# Patient Record
Sex: MALE | Race: WHITE | NOT HISPANIC OR LATINO | Employment: FULL TIME | URBAN - METROPOLITAN AREA
[De-identification: names, ages, dates, MRNs, and addresses within clinical notes are randomized per-mention and may not be internally consistent; named-entity substitution may affect disease eponyms.]

---

## 2019-10-24 ENCOUNTER — HOSPITAL ENCOUNTER (OUTPATIENT)
Dept: MEDSURG UNIT | Facility: HOSPITAL | Age: 54
End: 2019-10-26
Attending: INTERNAL MEDICINE | Admitting: INTERNAL MEDICINE

## 2019-10-24 LAB
ABS NEUT (OLG): 4.58 X10(3)/MCL (ref 2.1–9.2)
ALBUMIN SERPL-MCNC: 3.7 GM/DL (ref 3.4–5)
ALBUMIN/GLOB SERPL: 1.1 RATIO (ref 1.1–2)
ALP SERPL-CCNC: 101 UNIT/L (ref 45–117)
ALT SERPL-CCNC: 24 UNIT/L (ref 12–78)
AMPHET UR QL SCN: POSITIVE
APPEARANCE, UA: CLEAR
AST SERPL-CCNC: 17 UNIT/L (ref 15–37)
BACTERIA #/AREA URNS AUTO: ABNORMAL /HPF
BARBITURATE SCN PRESENT UR: NEGATIVE
BASOPHILS # BLD AUTO: 0.1 X10(3)/MCL (ref 0–0.2)
BASOPHILS NFR BLD AUTO: 1 %
BENZODIAZ UR QL SCN: NEGATIVE
BILIRUB SERPL-MCNC: 0.5 MG/DL (ref 0.2–1)
BILIRUB UR QL STRIP: NEGATIVE
BILIRUBIN DIRECT+TOT PNL SERPL-MCNC: 0.1 MG/DL (ref 0–0.2)
BILIRUBIN DIRECT+TOT PNL SERPL-MCNC: 0.4 MG/DL
BUN SERPL-MCNC: 18 MG/DL (ref 7–18)
CALCIUM SERPL-MCNC: 8.7 MG/DL (ref 8.5–10.1)
CANNABINOIDS UR QL SCN: POSITIVE
CHLORIDE SERPL-SCNC: 104 MMOL/L (ref 98–107)
CK MB SERPL-MCNC: 3.5 NG/ML (ref 1–3.6)
CK SERPL-CCNC: 147 UNIT/L (ref 39–308)
CO2 SERPL-SCNC: 28 MMOL/L (ref 21–32)
COCAINE UR QL SCN: NEGATIVE
COLOR UR: NORMAL
CREAT SERPL-MCNC: 1.4 MG/DL (ref 0.6–1.3)
EOSINOPHIL # BLD AUTO: 0.2 X10(3)/MCL (ref 0–0.9)
EOSINOPHIL NFR BLD AUTO: 2 %
ERYTHROCYTE [DISTWIDTH] IN BLOOD BY AUTOMATED COUNT: 12.6 % (ref 11.5–14.5)
GLOBULIN SER-MCNC: 3.4 GM/ML (ref 2.3–3.5)
GLUCOSE (UA): NORMAL
GLUCOSE SERPL-MCNC: 112 MG/DL (ref 74–106)
HCT VFR BLD AUTO: 43.7 % (ref 40–51)
HGB BLD-MCNC: 15.1 GM/DL (ref 13.5–17.5)
HGB UR QL STRIP: NEGATIVE
HYALINE CASTS #/AREA URNS LPF: ABNORMAL /LPF
IMM GRANULOCYTES # BLD AUTO: 0.03 10*3/UL
IMM GRANULOCYTES NFR BLD AUTO: 0 %
KETONES UR QL STRIP: NEGATIVE
LEUKOCYTE ESTERASE UR QL STRIP: NEGATIVE
LYMPHOCYTES # BLD AUTO: 2.6 X10(3)/MCL (ref 0.6–4.6)
LYMPHOCYTES NFR BLD AUTO: 33 %
MAGNESIUM SERPL-MCNC: 2 MG/DL (ref 1.6–2.6)
MCH RBC QN AUTO: 32.8 PG (ref 26–34)
MCHC RBC AUTO-ENTMCNC: 34.6 GM/DL (ref 31–37)
MCV RBC AUTO: 94.8 FL (ref 80–100)
MONOCYTES # BLD AUTO: 0.6 X10(3)/MCL (ref 0.1–1.3)
MONOCYTES NFR BLD AUTO: 7 %
NEUTROPHILS # BLD AUTO: 4.58 X10(3)/MCL (ref 2.1–9.2)
NEUTROPHILS NFR BLD AUTO: 57 %
NITRITE UR QL STRIP: NEGATIVE
OPIATES UR QL SCN: NEGATIVE
PCP UR QL: NEGATIVE
PH UR STRIP.AUTO: 7 [PH] (ref 5–8)
PH UR STRIP: 7 [PH] (ref 4.5–8)
PLATELET # BLD AUTO: 275 X10(3)/MCL (ref 130–400)
PMV BLD AUTO: 8.9 FL (ref 7.4–10.4)
POTASSIUM SERPL-SCNC: 3.8 MMOL/L (ref 3.5–5.1)
PROT SERPL-MCNC: 7.1 GM/DL (ref 6.4–8.2)
PROT UR QL STRIP: NEGATIVE
RBC # BLD AUTO: 4.61 X10(6)/MCL (ref 4.5–5.9)
RBC #/AREA URNS AUTO: ABNORMAL /HPF
SODIUM SERPL-SCNC: 136 MMOL/L (ref 136–145)
SP GR UR STRIP: 1.01 (ref 1–1.03)
SQUAMOUS #/AREA URNS LPF: ABNORMAL /LPF
TEMPERATURE, URINE (OHS): 20 DEGC (ref 20–25)
TROPONIN I SERPL-MCNC: 0.04 NG/ML (ref 0–0.05)
UROBILINOGEN UR STRIP-ACNC: NORMAL
WBC # SPEC AUTO: 8.1 X10(3)/MCL (ref 4.5–11)
WBC #/AREA URNS AUTO: ABNORMAL /HPF

## 2019-10-25 LAB
ABS NEUT (OLG): 4.64 X10(3)/MCL (ref 2.1–9.2)
ALBUMIN SERPL-MCNC: 3.5 GM/DL (ref 3.4–5)
ALBUMIN/GLOB SERPL: 1 RATIO (ref 1.1–2)
ALP SERPL-CCNC: 95 UNIT/L (ref 45–117)
ALT SERPL-CCNC: 21 UNIT/L (ref 12–78)
AST SERPL-CCNC: 15 UNIT/L (ref 15–37)
BASOPHILS # BLD AUTO: 0 X10(3)/MCL (ref 0–0.2)
BASOPHILS NFR BLD AUTO: 1 %
BILIRUB SERPL-MCNC: 0.6 MG/DL (ref 0.2–1)
BILIRUBIN DIRECT+TOT PNL SERPL-MCNC: 0.2 MG/DL (ref 0–0.2)
BILIRUBIN DIRECT+TOT PNL SERPL-MCNC: 0.4 MG/DL
BUN SERPL-MCNC: 14 MG/DL (ref 7–18)
CALCIUM SERPL-MCNC: 8.5 MG/DL (ref 8.5–10.1)
CHLORIDE SERPL-SCNC: 106 MMOL/L (ref 98–107)
CHOLEST SERPL-MCNC: 174 MG/DL
CHOLEST/HDLC SERPL: 3.7 {RATIO} (ref 0–5)
CO2 SERPL-SCNC: 24 MMOL/L (ref 21–32)
CREAT SERPL-MCNC: 1.1 MG/DL (ref 0.6–1.3)
EOSINOPHIL # BLD AUTO: 0.2 X10(3)/MCL (ref 0–0.9)
EOSINOPHIL NFR BLD AUTO: 2 %
ERYTHROCYTE [DISTWIDTH] IN BLOOD BY AUTOMATED COUNT: 12.7 % (ref 11.5–14.5)
EST. AVERAGE GLUCOSE BLD GHB EST-MCNC: 117 MG/DL
FOLATE SERPL-MCNC: 5.9 NG/ML (ref 3.1–17.5)
GLOBULIN SER-MCNC: 3.5 GM/ML (ref 2.3–3.5)
GLUCOSE SERPL-MCNC: 113 MG/DL (ref 74–106)
HAV IGM SERPL QL IA: NONREACTIVE
HBA1C MFR BLD: 5.7 % (ref 4.2–6.3)
HBV CORE IGM SERPL QL IA: NONREACTIVE
HBV SURFACE AG SERPL QL IA: NEGATIVE
HCT VFR BLD AUTO: 46.2 % (ref 40–51)
HCV AB SERPL QL IA: NONREACTIVE
HDLC SERPL-MCNC: 47 MG/DL (ref 40–59)
HGB BLD-MCNC: 15.6 GM/DL (ref 13.5–17.5)
HIV 1+2 AB+HIV1 P24 AG SERPL QL IA: NONREACTIVE
IMM GRANULOCYTES # BLD AUTO: 0.03 10*3/UL
IMM GRANULOCYTES NFR BLD AUTO: 0 %
LDLC SERPL CALC-MCNC: 100 MG/DL
LYMPHOCYTES # BLD AUTO: 2.6 X10(3)/MCL (ref 0.6–4.6)
LYMPHOCYTES NFR BLD AUTO: 32 %
MAGNESIUM SERPL-MCNC: 2.2 MG/DL (ref 1.6–2.6)
MCH RBC QN AUTO: 32.6 PG (ref 26–34)
MCHC RBC AUTO-ENTMCNC: 33.8 GM/DL (ref 31–37)
MCV RBC AUTO: 96.5 FL (ref 80–100)
MONOCYTES # BLD AUTO: 0.6 X10(3)/MCL (ref 0.1–1.3)
MONOCYTES NFR BLD AUTO: 8 %
NEUTROPHILS # BLD AUTO: 4.64 X10(3)/MCL (ref 2.1–9.2)
NEUTROPHILS NFR BLD AUTO: 57 %
PHOSPHATE SERPL-MCNC: 3.1 MG/DL (ref 2.5–4.9)
PLATELET # BLD AUTO: 273 X10(3)/MCL (ref 130–400)
PMV BLD AUTO: 9.1 FL (ref 7.4–10.4)
POTASSIUM SERPL-SCNC: 4.1 MMOL/L (ref 3.5–5.1)
PROT SERPL-MCNC: 7 GM/DL (ref 6.4–8.2)
RBC # BLD AUTO: 4.79 X10(6)/MCL (ref 4.5–5.9)
SODIUM SERPL-SCNC: 138 MMOL/L (ref 136–145)
TRIGL SERPL-MCNC: 133 MG/DL
TROPONIN I SERPL-MCNC: 0.04 NG/ML (ref 0–0.05)
TSH SERPL-ACNC: 0.99 MIU/L (ref 0.36–3.74)
VIT B12 SERPL-MCNC: 423 PG/ML (ref 193–986)
VLDLC SERPL CALC-MCNC: 27 MG/DL
WBC # SPEC AUTO: 8.2 X10(3)/MCL (ref 4.5–11)

## 2019-10-26 LAB
ABS NEUT (OLG): 5.25 X10(3)/MCL (ref 2.1–9.2)
ABS NEUT (OLG): 5.76 X10(3)/MCL (ref 2.1–9.2)
ALBUMIN SERPL-MCNC: 3.5 GM/DL (ref 3.4–5)
ALBUMIN SERPL-MCNC: 3.6 GM/DL (ref 3.4–5)
ALBUMIN/GLOB SERPL: 0.9 RATIO (ref 1.1–2)
ALBUMIN/GLOB SERPL: 0.9 RATIO (ref 1.1–2)
ALP SERPL-CCNC: 100 UNIT/L (ref 45–117)
ALP SERPL-CCNC: 102 UNIT/L (ref 45–117)
ALT SERPL-CCNC: 21 UNIT/L (ref 12–78)
ALT SERPL-CCNC: 23 UNIT/L (ref 12–78)
AST SERPL-CCNC: 14 UNIT/L (ref 15–37)
AST SERPL-CCNC: 18 UNIT/L (ref 15–37)
BASOPHILS # BLD AUTO: 0.1 X10(3)/MCL (ref 0–0.2)
BASOPHILS # BLD AUTO: 0.1 X10(3)/MCL (ref 0–0.2)
BASOPHILS NFR BLD AUTO: 1 %
BASOPHILS NFR BLD AUTO: 1 %
BILIRUB SERPL-MCNC: 0.5 MG/DL (ref 0.2–1)
BILIRUB SERPL-MCNC: 0.5 MG/DL (ref 0.2–1)
BILIRUBIN DIRECT+TOT PNL SERPL-MCNC: 0.1 MG/DL (ref 0–0.2)
BILIRUBIN DIRECT+TOT PNL SERPL-MCNC: 0.2 MG/DL (ref 0–0.2)
BILIRUBIN DIRECT+TOT PNL SERPL-MCNC: 0.3 MG/DL
BILIRUBIN DIRECT+TOT PNL SERPL-MCNC: 0.4 MG/DL
BUN SERPL-MCNC: 17 MG/DL (ref 7–18)
BUN SERPL-MCNC: 18 MG/DL (ref 7–18)
CALCIUM SERPL-MCNC: 8.9 MG/DL (ref 8.5–10.1)
CALCIUM SERPL-MCNC: 9 MG/DL (ref 8.5–10.1)
CHLORIDE SERPL-SCNC: 101 MMOL/L (ref 98–107)
CHLORIDE SERPL-SCNC: 102 MMOL/L (ref 98–107)
CO2 SERPL-SCNC: 25 MMOL/L (ref 21–32)
CO2 SERPL-SCNC: 28 MMOL/L (ref 21–32)
CREAT SERPL-MCNC: 1.2 MG/DL (ref 0.6–1.3)
CREAT SERPL-MCNC: 1.2 MG/DL (ref 0.6–1.3)
EOSINOPHIL # BLD AUTO: 0.2 X10(3)/MCL (ref 0–0.9)
EOSINOPHIL # BLD AUTO: 0.2 X10(3)/MCL (ref 0–0.9)
EOSINOPHIL NFR BLD AUTO: 2 %
EOSINOPHIL NFR BLD AUTO: 2 %
ERYTHROCYTE [DISTWIDTH] IN BLOOD BY AUTOMATED COUNT: 12.6 % (ref 11.5–14.5)
ERYTHROCYTE [DISTWIDTH] IN BLOOD BY AUTOMATED COUNT: 12.7 % (ref 11.5–14.5)
GLOBULIN SER-MCNC: 3.7 GM/ML (ref 2.3–3.5)
GLOBULIN SER-MCNC: 3.9 GM/ML (ref 2.3–3.5)
GLUCOSE SERPL-MCNC: 103 MG/DL (ref 74–106)
GLUCOSE SERPL-MCNC: 109 MG/DL (ref 74–106)
HCT VFR BLD AUTO: 47.8 % (ref 40–51)
HCT VFR BLD AUTO: 50.3 % (ref 40–51)
HGB BLD-MCNC: 16.1 GM/DL (ref 13.5–17.5)
HGB BLD-MCNC: 17.1 GM/DL (ref 13.5–17.5)
IMM GRANULOCYTES # BLD AUTO: 0.03 10*3/UL
IMM GRANULOCYTES # BLD AUTO: 0.05 10*3/UL
IMM GRANULOCYTES NFR BLD AUTO: 0 %
IMM GRANULOCYTES NFR BLD AUTO: 1 %
LYMPHOCYTES # BLD AUTO: 2.4 X10(3)/MCL (ref 0.6–4.6)
LYMPHOCYTES # BLD AUTO: 2.6 X10(3)/MCL (ref 0.6–4.6)
LYMPHOCYTES NFR BLD AUTO: 26 %
LYMPHOCYTES NFR BLD AUTO: 30 %
MAGNESIUM SERPL-MCNC: 2.1 MG/DL (ref 1.6–2.6)
MCH RBC QN AUTO: 31.9 PG (ref 26–34)
MCH RBC QN AUTO: 32.9 PG (ref 26–34)
MCHC RBC AUTO-ENTMCNC: 33.7 GM/DL (ref 31–37)
MCHC RBC AUTO-ENTMCNC: 34 GM/DL (ref 31–37)
MCV RBC AUTO: 94.7 FL (ref 80–100)
MCV RBC AUTO: 96.7 FL (ref 80–100)
MONOCYTES # BLD AUTO: 0.6 X10(3)/MCL (ref 0.1–1.3)
MONOCYTES # BLD AUTO: 0.7 X10(3)/MCL (ref 0.1–1.3)
MONOCYTES NFR BLD AUTO: 7 %
MONOCYTES NFR BLD AUTO: 8 %
NEUTROPHILS # BLD AUTO: 5.25 X10(3)/MCL (ref 2.1–9.2)
NEUTROPHILS # BLD AUTO: 5.76 X10(3)/MCL (ref 2.1–9.2)
NEUTROPHILS NFR BLD AUTO: 59 %
NEUTROPHILS NFR BLD AUTO: 63 %
PHOSPHATE SERPL-MCNC: 4 MG/DL (ref 2.5–4.9)
PHOSPHATE SERPL-MCNC: 4 MG/DL (ref 2.5–4.9)
PLATELET # BLD AUTO: 276 X10(3)/MCL (ref 130–400)
PLATELET # BLD AUTO: 277 X10(3)/MCL (ref 130–400)
PMV BLD AUTO: 8.8 FL (ref 7.4–10.4)
PMV BLD AUTO: 8.9 FL (ref 7.4–10.4)
POTASSIUM SERPL-SCNC: 3.7 MMOL/L (ref 3.5–5.1)
POTASSIUM SERPL-SCNC: 4.1 MMOL/L (ref 3.5–5.1)
PROT SERPL-MCNC: 7.2 GM/DL (ref 6.4–8.2)
PROT SERPL-MCNC: 7.5 GM/DL (ref 6.4–8.2)
RBC # BLD AUTO: 5.05 X10(6)/MCL (ref 4.5–5.9)
RBC # BLD AUTO: 5.2 X10(6)/MCL (ref 4.5–5.9)
SODIUM SERPL-SCNC: 136 MMOL/L (ref 136–145)
SODIUM SERPL-SCNC: 136 MMOL/L (ref 136–145)
WBC # SPEC AUTO: 8.8 X10(3)/MCL (ref 4.5–11)
WBC # SPEC AUTO: 9.1 X10(3)/MCL (ref 4.5–11)

## 2022-04-30 NOTE — ED PROVIDER NOTES
"   Patient:   Isaías Prasad             MRN: [id number]            FIN: [id number]               Age:   54 years     Sex:  Male     :  1965   Associated Diagnoses:   Brain TIA   Author:   Uri Camargo MD      Basic Information   Time seen: Date 10/24/2019, Immediately upon arrival.   History source: Patient.   Arrival mode: Private vehicle.   History limitation: None.   Additional information: Chief Complaint from Nursing Triage Note : Chief Complaint   10/24/2019 18:49 CDT     Chief Complaint           SLURRED SPEECH. PT C/O RT SIDE WEAKNESS.  WOKE UP WITH SYMPTOMS THIS MORNING.  .      History of Present Illness   The patient presents with weakness.  The onset was 1  days ago.  The course/duration of symptoms is improving.  The character of symptoms is generalized.  The degree at present is none.  Risk factors consist of hypertension and smoking.  Prior episodes: none.  Therapy today: see nurses notes.  Associated symptoms: denies chest pain, denies abdominal pain, denies nausea, denies vomiting, denies shortness of breath, denies fever, denies chills, denies headache, denies dizziness and denies back pain.       53y/o WM presents with complaint of right side weakness and slurred speech.  Pt reports he woke up with symptoms,  Pt reports the following: "his right leg not walk right and noticed his speech was funny".  he went to work and performed all his duties but "something didn't feel right".  After work his grilfriend dropped him to the ER for further evaluation.  He reports that his symptoms improved throughout the day however "my speech didn't and I was scared I had a stroke so I came in to get checked out".  Pt reports he has a history of HTN but has not taken BP meds > 3 years "because when I was on it, it made me feel worse, so I stopped taking the meds".       Review of Systems   Constitutional symptoms:  Weakness, no fever, no chills, no sweats, no fatigue, no decreased activity.    Skin " symptoms:  No rash,    Eye symptoms:  No recent vision problems, no blurred vision.    Respiratory symptoms:  No shortness of breath, no cough, no sputum production.    Cardiovascular symptoms:  No chest pain, no palpitations, no tachycardia, no syncope, no diaphoresis, no peripheral edema.    Gastrointestinal symptoms:  No abdominal pain, no nausea.    Neurologic symptoms:  No headache, no dizziness, no altered level of consciousness, no numbness, no tingling, no weakness.              Additional review of systems information: All other systems reviewed and otherwise negative.      Health Status   Allergies:    Allergic Reactions (Selected)  No Known Medication Allergies,    Allergies (1) Active Reaction  No Known Medication Allergies None Documented  .   Medications: Per nurse's notes.      Past Medical/ Family/ Social History   Medical history:    No active or resolved past medical history items have been selected or recorded., Reviewed as documented in chart.   Surgical history:    Left arm ([id number]) on 6/5/2014 at 48 Years., Reviewed as documented in chart.   Family history:    No family history items have been selected or recorded., Reviewed as documented in chart.   Social history:    Social & Psychosocial Habits    Tobacco  08/03/2017  Use: Current every day smoker    Type: Cigarettes    10/24/2019  Use: 10 or more cigarettes (1/    Patient Wants Consult For Cessation Counseling No    Abuse/Neglect  10/24/2019  SHX Any signs of abuse or neglect No  , Reviewed as documented in chart.   Problem list:    Active Problems (1)  Tobacco user   .      Physical Examination               Vital Signs   Vital Signs   10/24/2019 18:49 CDT     Temperature Oral          36.7 DegC                             Temperature Oral (calculated)             98.06 DegF                             Peripheral Pulse Rate     93 bpm                             Respiratory Rate          20 br/min                             SpO2                       100 %                             Oxygen Therapy            Room air                             Systolic Blood Pressure   195 mmHg  HI                             Diastolic Blood Pressure  127 mmHg  HI  .   General:  Alert, no acute distress.    Skin:  Normal for ethnicity.   Head:  Normocephalic.   Neck:  Supple, trachea midline, no tenderness.    Eye:  Pupils are equal, round and reactive to light, extraocular movements are intact, normal conjunctiva.    Ears, nose, mouth and throat:  Oral mucosa moist.   Cardiovascular:  Regular rate and rhythm, Normal peripheral perfusion.    Respiratory:  Lungs are clear to auscultation, respirations are non-labored, breath sounds are equal.    Gastrointestinal:  Soft, Nontender, Non distended, Normal bowel sounds.    Musculoskeletal:  Normal ROM, normal strength, no tenderness, no swelling, no deformity.    Neurological:  Alert and oriented to person, place, time, and situation, No focal neurological deficit observed, CN II-XII intact, normal sensory observed, normal motor observed, normal speech observed, normal coordination observed.    Psychiatric:  Cooperative.      Medical Decision Making   Documents reviewed:  Emergency department nurses' notes, emergency department records, prior records.    Electrocardiogram:  Time 10/24/2019 19:02:00, rate 86, normal sinus rhythm, No ST-T changes, no ectopy, EP Interp, QRS interval Right bundle branch block, left bundle branch block.    Results review:  Lab results : Lab View   10/24/2019 20:38 CDT     UA Appear                 Clear                             UA Color                  LIGHT YELLOW                             UA Spec Grav              1.015                             UA Bili                   Negative                             UA pH                     7.0                             UA Urobilinogen           Normal                             UA Blood                  Negative                              UA Glucose                Normal                             UA Ketones                Negative                             UA Protein                Negative                             UA Nitrite                Negative                             UA Leuk Est               Negative                             UA WBC Interp             0-2 /HPF                             UA RBC Interp             3-5 /HPF                             UA Bact Interp            None Seen /HPF                             UA Squam Epi Interp       2-20 /LPF                             UA Hyal Cast Interp       0-2 /LPF    10/24/2019 19:30 CDT     Sodium Lvl                136 mmol/L                             Potassium Lvl             3.8 mmol/L                             Chloride                  104 mmol/L                             CO2                       28 mmol/L                             Calcium Lvl               8.7 mg/dL                             Magnesium Lvl             2.0 mg/dL                             Glucose Lvl               112 mg/dL  HI                             BUN                       18 mg/dL                             Creatinine                1.40 mg/dL  HI                             eGFR-AA                   68 mL/min  LOW                             eGFR-LUIS                  56 mL/min  LOW                             Bili Total                0.5 mg/dL                             Bili Direct               0.1 mg/dL                             Bili Indirect             0.4 mg/dL                             AST                       17 unit/L                             ALT                       24 unit/L                             Alk Phos                  101 unit/L                             Total Protein             7.1 gm/dL                             Albumin Lvl               3.7 gm/dL                             Globulin                  3.40 gm/mL                             A/G  Ratio                 1.1 ratio                             Total CK                  147 unit/L                             CK MB                     3.5 ng/mL                             Troponin-I                0.043 ng/mL                             WBC                       8.1 x10(3)/mcL                             RBC                       4.61 x10(6)/mcL                             Hgb                       15.1 gm/dL                             Hct                       43.7 %                             Platelet                  275 x10(3)/mcL                             MCV                       94.8 fL                             MCH                       32.8 pg                             MCHC                      34.6 gm/dL                             RDW                       12.6 %                             MPV                       8.9 fL                             Abs Neut                  4.58 x10(3)/mcL                             Neutro Auto               57 %  NA                             Lymph Auto                33 %  NA                             Mono Auto                 7 %  NA                             Eos Auto                  2 %  NA                             Abs Eos                   0.2 x10(3)/mcL                             Basophil Auto             1 %  NA                             Abs Neutro                4.58 x10(3)/mcL                             Abs Lymph                 2.6 x10(3)/mcL                             Abs Mono                  0.6 x10(3)/mcL                             Abs Baso                  0.1 x10(3)/mcL                             IG%                       0 %  NA                             IG#                       0.0300  NA  .   Radiology results:  Reviewed radiologist's report, Rad Results (ST)  < 12 hrs   Accession: [id number]  Order: CT Head W/O Contrast  Report Dt/Tm: 10/24/2019 20:39  Report:   CT of the head without contrast      [id number]     INDICATION: Right-sided weakness, slurred speech     TECHNIQUE: Routine CT of the head was performed without contrast     Total DLP: 1270.25 mGy.cm      Automatic exposure control was utilized to reduce patient's radiation  dose.     COMPARISON: None     FINDINGS: There is no evidence for acute hemorrhage, midline shift,  mass effect, or extra-axial collection. There are remote bilateral  basal ganglia lacunar infarcts. There is small remote area of  encephalomalacia in the right frontal lobe. Patchy periventricular  white matter decreased attenuation is seen. No sulcal effacement. No  acute large territory infarct evident Gray-white matter  differentiation is preserved. No hyperdense MCA sign. Intracranial  vascular calcifications are noted. Visualized osseous structures are  intact. The visualized paranasal sinuses and mastoid air cells are  clear.     IMPRESSION: No acute intracranial abnormality. Chronic changes as  described.      .    Radiology results:  X-ray, chest, 10/24/2019 21:51; Mary Jo CARTY, Asma; Negative; No infiltrate, mass or other acute cardiopulmonary abnormality.       Reexamination/ Reevaluation   Time: 10/24/2019 21:50:00 .   Vital signs   results included from flowsheet : Vital Signs   10/24/2019 21:30 CDT     Peripheral Pulse Rate     82 bpm                             Heart Rate Monitored      82 bpm                             Respiratory Rate          14 br/min                             SpO2                      97 %                             Oxygen Therapy            Room air                             Systolic Blood Pressure   168 mmHg  HI                             Diastolic Blood Pressure  109 mmHg  HI                             Mean Arterial Pressure, Cuff              129 mmHg                             Blood Pressure Location   Left arm                             Blood Pressure Cuff Size  Adult long    10/24/2019 21:00 CDT     Peripheral Pulse Rate     81 bpm                              Heart Rate Monitored      81 bpm                             Respiratory Rate          18 br/min                             SpO2                      99 %                             Oxygen Therapy            Room air                             Systolic Blood Pressure   147 mmHg  HI                             Diastolic Blood Pressure  96 mmHg  HI                             Mean Arterial Pressure, Cuff              113 mmHg                             Blood Pressure Location   Left arm                             Blood Pressure Cuff Size  Adult long    10/24/2019 20:30 CDT     Peripheral Pulse Rate     87 bpm                             Heart Rate Monitored      87 bpm                             Respiratory Rate          14 br/min                             SpO2                      99 %                             Oxygen Therapy            Room air                             Systolic Blood Pressure   176 mmHg  HI                             Diastolic Blood Pressure  112 mmHg  HI                             Mean Arterial Pressure, Cuff              133 mmHg                             Blood Pressure Location   Left arm                             Blood Pressure Cuff Size  Adult long    10/24/2019 20:08 CDT     Peripheral Pulse Rate     84 bpm                             Heart Rate Monitored      84 bpm                             Respiratory Rate          17 br/min                             SpO2                      100 %                             Oxygen Therapy            Room air                             Systolic Blood Pressure   174 mmHg  HI                             Diastolic Blood Pressure  113 mmHg  HI                             Mean Arterial Pressure, Cuff              133 mmHg                             Blood Pressure Location   Left arm                             Blood Pressure Cuff Size  Adult long    10/24/2019 19:30 CDT     Peripheral Pulse Rate     85 bpm                              Heart Rate Monitored      84 bpm                             Respiratory Rate          19 br/min                             SpO2                      98 %                             Oxygen Therapy            Room air                             Systolic Blood Pressure   170 mmHg  HI                             Diastolic Blood Pressure  110 mmHg  HI                             Mean Arterial Pressure, Cuff              130 mmHg                             Blood Pressure Location   Left arm                             Blood Pressure Cuff Size  Adult long    10/24/2019 19:12 CDT     Peripheral Pulse Rate     88 bpm                             Respiratory Rate          17 br/min                             SpO2                      99 %    10/24/2019 19:05 CDT     Peripheral Pulse Rate     87 bpm                             Heart Rate Monitored      87 bpm                             Respiratory Rate          18 br/min                             SpO2                      99 %                             Oxygen Therapy            Room air                             Systolic Blood Pressure   183 mmHg  HI                             Diastolic Blood Pressure  117 mmHg  HI                             Mean Arterial Pressure, Cuff              139 mmHg  (Modified)                            Blood Pressure Location   Left arm                             Blood Pressure Cuff Size  Adult    10/24/2019 18:49 CDT     Temperature Oral          36.7 DegC                             Temperature Oral (calculated)             98.06 DegF                             Peripheral Pulse Rate     93 bpm                             Respiratory Rate          20 br/min                             SpO2                      100 %                             Oxygen Therapy            Room air                             Systolic Blood Pressure   195 mmHg  HI                             Diastolic Blood Pressure  127 mmHg  HI      Course: improving.   Pain status: decreased.   Assessment: exam improved, Pt NAD, VSS, pt not ill or toxic appearing, pt with no acute abdomen, no neuro defecits, no active CP or SOB. The patient is resting comfortably and in non acute distress. I personally discussed her test results and treatment plan. Specific conditions for return to the emergency department and importance of follow up with her primary care provided or the physician listed on the discharge instructions were also addressed with patient. Advise patient to finish her treatment given today. Patient voices understanding and agrees to the plan discussed. All patients questions have been answered at this time. She has remained hemodynamically stable throughout entire stay in ED and is stable for discharge home..     .      Impression and Plan   Diagnosis   Brain TIA ([id number] G45.9)      Calls-Consults   -  10/24/2019 21:52:00 , Internal Medicine , recommends admission.    Plan   Condition: Stable.    Disposition: Place in Observation Telemetry Unit.    Counseled: Patient, Regarding diagnosis, Regarding diagnostic results, Regarding treatment plan, Patient indicated understanding of instructions.

## 2022-05-03 NOTE — HISTORICAL [facility] [employer]
This is a historical note converted from [employer]. Formatting and pictures may have been removed.  Please reference [employer] for original formatting and attached multimedia. Admit and Discharge Dates  Admit Date: 10/24/2019  Discharge Date: 10/26/2019  Physicians  Attending Physician - MD Ji, Kat  Admitting Physician - Nelly CARTY, Uriel Tracy  Primary Care Physician - No PCP, No  Discharge Diagnosis  1.?Ischemic stroke?I63.9  Brain TIA?G45.9  HTN - Hypertension?[id number]  Weakness or fatigue?[id number]  Surgical Procedures  No procedures recorded for this visit.  Immunizations  10/25/2019 - influenza virus vaccine, inactivated?  Admission Information  Patient is a 54-year-old  male with past medical history of tobacco abuse, and untreated hypertension came to the ER on 10/24/19 with complaints of right-sided weakness and numbness. ?Patient states he woke up in the morning of 10/24/19 with right arm right leg and right face weakness. ?Patient says the symptoms were new, did not stumble or fall, no LOC/presyncope. ?Also complained of right-sided mouth deviation. ?No involuntary voiding of stool or urine. ?No other instances of previous complaints. ?Patient states the right arm weakness and right leg weakness resolved prior to coming into the ER later that day, however states he did not feel right says he felt off. ?Upon my examination patient was? somnolent however would talk to me in complete sentences alert oriented??3  Hospital Course  Patient admitted for suspected CVA and work up initiated including MRI brain which revealed acute infarct of internal capsule posterior limb which is consistent with hypertensive ischemia. Patient non-compliant with BP meds and has been educated on importance of medication and clinic compliance. US carotids revealed less than 50 % occlusion. Patient presented with weakness in right arm and leg, right sided mouth  deviation which resolved 2nd day of admission. Patient evaluated for excoriations on arm and history of?illicit substance use (UDS + for amphetamines and cannabis)?with hepatitis panel. Patient began having agitation and tactile hallucinations overnight believing bugs were crawling over him and was given ativan for suspected alcohol withdrawal. Patient discharged with clinic f/u in 2 weeks and educated on etiology of CVA and importance of plavix, aspirin, statin, BP med compliance.  Significant Findings  30 minutes  Time Spent on discharge  Acute infarct in left internal capsule posterior limb  Objective  Vitals & Measurements  T:?36? ?C (Oral)? TMIN:?36? ?C (Oral)? TMAX:?37.0? ?C (Oral)? HR:?80(Peripheral)? HR:?83(Monitored)? RR:?18? BP:?137/76? SpO2:?98%?  Physical Exam  General: Alert and oriented, No acute distress  HEENT:?EOMI, Normal hearing, Oral mucosa moist, No pharyngeal exudates  Respiratory: CTAB, No c/r/w, Respirations non-labored, Breath sounds equal.  Cardiovascular: Normal rate, Regular rhythm, No c/m/r/g, no lower extremity edema  Gastrointestinal: Soft, NT, ND  Musculoskeletal: Normal range of motion,? No swelling, No deformity,  Integumentary: Warm, Dry, Intact.? Multiple excoriations on left?forearm  Neurologic:?No focal motor or sensory deficits, power 5 x 5 bilateral upper and lower extremities, sensation intact?,CN II-XII grossly intact  Patient Discharge Condition  clinically and hemodynamically stable  Discharge Disposition  home   Discharge Medication Reconciliation  Prescribed  aspirin (aspirin 81 mg oral tablet, CHEWABLE)?81 mg, Oral, Daily  atorvastatin (atorvastatin 20 mg oral tablet)?40 mg, Oral, Daily  chlorthalidone (chlorthalidone 25 mg oral tablet)?25 mg, Oral, Daily  clopidogrel (Plavix 75 mg oral tablet)?75 mg, Oral, Daily  lisinopril (lisinopril 10 mg oral tablet)?20 mg, Oral, Daily  nicotine (nicotine 21 mg/24 hr transdermal film, extended release)?1 patch(es), TD,  Daily  Discontinue  hydrochlorothiazide-lisinopril (hydrochlorothiazide-lisinopril 12.5 mg-20 mg oral tablet)?1 tab(s), Oral, Daily  Education and Orders Provided  Ischemic Stroke, Easy-to-Read  Transient Ischemic Attack, Easy-to-Read  Alcohol Withdrawal, Easy-to-Read  Tobacco Use Disorder  Hypertension, Easy-to-Read  Discharge - 10/26/19 10:20:00 CDT, Home?  Follow up  Report to Emergency Department if symptoms return or worsen  [facility], within 2 days, on  ????  post wards [facility] ([place])      Patient seen and examined this am.? No neurologic complaints.? No further feelings of his skin crawling.? Discharge plan discussed with residents on rounds.

## 2022-05-03 NOTE — HISTORICAL [facility]
This is a historical note converted from Gabby. Formatting and pictures may have been removed.  Please reference Gabby for original formatting and attached multimedia. Chief Complaint  SLURRED SPEECH. PT C/O RT SIDE WEAKNESS. ?WOKE UP WITH SYMPTOMS THIS MORNING.  History of Present Illness  Patient is a 54-year-old  male with past medical history of tobacco abuse, and untreated hypertension came to the ER on 10/24/19 with complaints of right-sided weakness and numbness. ?Patient states he woke up in the morning of 10/24/19 with right arm right leg and right face weakness. ?Patient says the symptoms were new, did not stumble or fall, no LOC/presyncope. ?Also complained of right-sided mouth deviation. ?No involuntary voiding of stool or urine. ?No other instances of previous complaints. ?Patient states the right arm weakness and right leg weakness resolved prior to coming into the ER later that day, however states he did not feel right says he felt off. ?Upon my examination patient was? somnolent however would talk to me in complete sentences alert oriented??3.  ?  ED course: CT head negative,?noted by ER physician to have no residual deficits, was hypertensive, was given clonidine 0.1 upon presentation?drop in blood pressure from 190s to 150s  FH-stroke in father age 55  Social history-active tobacco user half pack a day for last?25 years, daily alcohol?6 pack a day of beer?last drink 1 day prior, occasional?vodka as well ,?admits to snorting methamphetamine 2 days prior, does have a history of illicit drug use including methamphetamine and marijuana  Surgical history-none  Medications-none?not taking hydrochlorothiazide-lisinopril?for blood pressure  Review of Systems  Gen: AOx3, No Fever, No Weight Changes  Heart: No chest pains, No palpitations  Lungs: No SOB, No wheezing  GI: No abd pain, No nausea, No vomiting, No diarrhea  : No hematuria, No dysuria  MSK: No LE swelling  Integumentary: No rash, No  pruritus  Physical Exam  Vitals & Measurements  T:?36.8? ?C (Oral)? TMIN:?36.7? ?C (Oral)? TMAX:?36.8? ?C (Oral)? HR:?74(Peripheral)? RR:?15? BP:?168/114? SpO2:?92%? WT:?85?kg? BMI:?27.31?  General: Alert and oriented, No acute distress, somnolent  HEENT: PERRLA, EOMI, Normal hearing, Oral mucosa moist, No pharyngeal exudates  Respiratory: CTAB, No c/r/w, Respirations non-labored, Breath sounds equal.  Cardiovascular: Normal rate, Regular rhythm, No c/m/r/g, pulses palpable bilateral upper and lower extremities  Gastrointestinal: Soft, NT, ND  Musculoskeletal: Normal range of motion,? No swelling, No deformity,  Integumentary: Warm, Dry, Intact.? Multiple excoriations on left?forearm  Neurologic: Alert, Oriented, No focal deficits, power 5 x 5 bilateral upper and lower extremities, sensation intact?,CN II-XII grossly intact, except for slight right sided?mouth deviation  Assessment/Plan  TIA  Uncontrolled hypertension  GALDINO, nonoliguric  Tobacco abuse  Alcohol abuse  Illicit drug use  ?  -Patient admitted for TIA with symptoms of right?sided weakness, all of which have resolved apart from slight right-sided mouth deviation, CT head negative for IC bleed do show remote bilateral basal gangila infarcts.? Aspirin, statin ordered. ?Lipid panel , A1c ordered, TSH ordered, HIV ordered, hep panel ordered,?also echo, carotid ultrasound, MRI of head.  -Nothing by mouth, bedside?swallow study ordered, advance to low sodium diet if normal.? PT OT ordered  -Allowing for permissive hypertension, will need adequate oral hypertensives upon discharge, along with referral to clinic for establishment of PCP  -Nicotine patch 14, Ativan when necessary for alcohol abuse history, last drink?one day ago, UDS positive for amphetamines,?monitor withdrawal symptoms  ?  Disposition: Patient admitted for TIA,?could be secondary to combination of?long smoking history, including alcohol and illicit drug use along with uncontrolled hypertension,  follow-up?morning labs?imaging?and diagnostic tests.? Patient does need a PCP at?discharge   Problem List/Past Medical History  Ongoing  TIA  Tobacco user  Historical  No qualifying data  Medications  Inpatient  acetaminophen, 650 mg= 2 tab(s), Oral, q6hr, PRN  aspirin 81 mg oral tablet, CHEWABLE, 81 mg= 1 tab(s), Oral, Daily  Ativan 2 mg/mL injectable solution, 2 mg= 1 mL, IV Push, q2hr, PRN  atorvastatin 20 mg oral tablet, 40 mg= 2 tab(s), Oral, Daily  Influenza Virus Vaccine, Inactivated, 0.5 mL, IM, Daily  Lovenox, 40 mg= 0.4 mL, Subcutaneous, Daily  nicotine 14 mg/24 hr transdermal film, extended release, 14 mg= 1 patch(es), TD, Daily  Zofran, 4 mg= 2 mL, IV Push, q4hr, PRN  Zofran, 8 mg= 4 mL, IV Push, q4hr, PRN  Home  hydrochlorothiazide-lisinopril 12.5 mg-20 mg oral tablet, 1 tab(s), Oral, Daily,? ?Still taking, not as prescribed: non compliant  Allergies  No Known Medication Allergies  Lab Results  Labs Last 24 Hours?  ?Chemistry? Hematology/Coagulation?   Sodium Lvl: 136 mmol/L (10/24/19 19:30:00) WBC: 8.1 x10(3)/mcL (10/24/19 19:30:00)   Potassium Lvl: 3.8 mmol/L (10/24/19 19:30:00) RBC: 4.61 x10(6)/mcL (10/24/19 19:30:00)   Chloride: 104 mmol/L (10/24/19 19:30:00) Hgb: 15.1 gm/dL (10/24/19 19:30:00)   CO2: 28 mmol/L (10/24/19 19:30:00) Hct: 43.7 % (10/24/19 19:30:00)   Calcium Lvl: 8.7 mg/dL (10/24/19 19:30:00) Platelet: 275 x10(3)/mcL (10/24/19 19:30:00)   Magnesium Lvl: 2 mg/dL (10/24/19 19:30:00) MCV: 94.8 fL (10/24/19 19:30:00)   Glucose Lvl:?112 mg/dL?High (10/24/19 19:30:00) MCH: 32.8 pg (10/24/19 19:30:00)   BUN: 18 mg/dL (10/24/19 19:30:00) MCHC: 34.6 gm/dL (10/24/19 19:30:00)   Creatinine:?1.4 mg/dL?High (10/24/19 19:30:00) RDW: 12.6 % (10/24/19 19:30:00)   eGFR-AA:?68 mL/min?Low (10/24/19 19:30:00) MPV: 8.9 fL (10/24/19 19:30:00)   eGFR-LUIS:?56 mL/min?Low (10/24/19 19:30:00) Abs Neut: 4.58 x10(3)/mcL (10/24/19 19:30:00)   Bili Total: 0.5 mg/dL (10/24/19 19:30:00) Neutro Auto: 57 % (10/24/19  19:30:00)   Bili Direct: 0.1 mg/dL (10/24/19 19:30:00) Lymph Auto: 33 % (10/24/19 19:30:00)   Bili Indirect: 0.4 mg/dL (10/24/19 19:30:00) Mono Auto: 7 % (10/24/19 19:30:00)   AST: 17 unit/L (10/24/19 19:30:00) Eos Auto: 2 % (10/24/19 19:30:00)   ALT: 24 unit/L (10/24/19 19:30:00) Abs Eos: 0.2 x10(3)/mcL (10/24/19 19:30:00)   Alk Phos: 101 unit/L (10/24/19 19:30:00) Basophil Auto: 1 % (10/24/19 19:30:00)   Total Protein: 7.1 gm/dL (10/24/19 19:30:00) Abs Neutro: 4.58 x10(3)/mcL (10/24/19 19:30:00)   Albumin Lvl: 3.7 gm/dL (10/24/19 19:30:00) Abs Lymph: 2.6 x10(3)/mcL (10/24/19 19:30:00)   Globulin: 3.4 gm/mL (10/24/19 19:30:00) Abs Mono: 0.6 x10(3)/mcL (10/24/19 19:30:00)   A/G Ratio: 1.1 ratio (10/24/19 19:30:00) Abs Baso: 0.1 x10(3)/mcL (10/24/19 19:30:00)   Total CK: 147 unit/L (10/24/19 19:30:00) IG%: 0 % (10/24/19 19:30:00)   CK MB: 3.5 ng/mL (10/24/19 19:30:00) IG#: 0.03 (10/24/19 19:30:00)   Troponin-I: 0.043 ng/mL (10/24/19 19:30:00)    U pH: 7 (10/24/19 23:08:00)    Diagnostic Results  Accession:?[id number]  Order:?CT Head W/O Contrast  Report Dt/Tm:?10/24/2019 20:39  Report:?  CT of the head without contrast  ?  [id number]  ?  INDICATION: Right-sided weakness, slurred speech  ?  TECHNIQUE: Routine CT of the head was performed without contrast  ?  Total DLP: 1270.25 mGy.cm?  ?  Automatic exposure control was utilized to reduce patients radiation  dose.  ?  COMPARISON: None  ?  FINDINGS: There is no evidence for acute hemorrhage, midline shift,  mass effect, or extra-axial collection. There are remote bilateral  basal ganglia lacunar infarcts. There is small remote area of  encephalomalacia in the right frontal lobe. Patchy periventricular  white matter decreased attenuation is seen. No sulcal effacement. No  acute large territory infarct evident Gray-white matter  differentiation is preserved. No hyperdense MCA sign. Intracranial  vascular calcifications are noted. Visualized osseous structures are  intact.  The visualized paranasal sinuses and mastoid air cells are  clear.  ?  IMPRESSION: No acute intracranial abnormality. Chronic changes as  described.  ?  ?      Patient seen and examined at bedside, reporting he had weakness, speech deficits yesterday but currently AOx3, full motor and sensory strength in all limbs, Cranial Nerves / extra-ocular movements intact. He says hes never had symptoms like this before and knows he has hypertension but doesnt like to take his BP meds. MRI brain w/o contrast revealed small acute?ischemic infarct of left internal capsule posterior limb. On presentation, UDS was positive for amphetamines and cannabinoids. EKG revealed RBBB and tele-monitor demonstrated an 8 beat run of V-tach. TTE revealed current EF of 40%, diastolic dysfunction and bubble study negative for shunting. Patient appears to have had TIA 2/2 uncontrolled hypertension, tobacco use with significant improvement on physical exam at present time compared to time of presentation. Continue aspirin, statin, Lovenox. Will continue to monitor closely and consider further work up for CVA.   I was present with the resident during the history and physical examination.  ???  [x ] I discussed the case with the resident and agree with the findings and plan as documented in the residents note.  [ ] I discussed the case with the resident and agree with the findings and plan as documented in the residents note except:  Strength 5/5? bilateral this am however some difficulty with dexterity of right upper extremity  No cranial nerve deficits  MRI confirms left internal capsule ischemic stroke  PT  ASA, Statin, life style modifications, control blood pressure  TTE shows 40% EF, with diastolic dysfunction- needs BP control, meds to improve EF

## 2022-05-17 ENCOUNTER — HOSPITAL ENCOUNTER (INPATIENT)
Facility: HOSPITAL | Age: 57
LOS: 10 days | Discharge: HOME | DRG: 330 | End: 2022-05-27
Attending: FAMILY MEDICINE | Admitting: STUDENT IN AN ORGANIZED HEALTH CARE EDUCATION/TRAINING PROGRAM
Payer: MEDICAID

## 2022-05-17 DIAGNOSIS — T18.5XXA RECTAL FOREIGN BODY: ICD-10-CM

## 2022-05-17 DIAGNOSIS — T18.5XXA RECTAL FOREIGN BODY, INITIAL ENCOUNTER: Primary | ICD-10-CM

## 2022-05-17 PROCEDURE — 94761 N-INVAS EAR/PLS OXIMETRY MLT: CPT

## 2022-05-17 PROCEDURE — 99285 EMERGENCY DEPT VISIT HI MDM: CPT | Mod: 25

## 2022-05-17 PROCEDURE — 11000001 HC ACUTE MED/SURG PRIVATE ROOM

## 2022-05-18 ENCOUNTER — ANESTHESIA EVENT (OUTPATIENT)
Dept: SURGERY | Facility: HOSPITAL | Age: 57
DRG: 330 | End: 2022-05-18

## 2022-05-18 ENCOUNTER — ANESTHESIA (OUTPATIENT)
Dept: SURGERY | Facility: HOSPITAL | Age: 57
DRG: 330 | End: 2022-05-18

## 2022-05-18 LAB
ANION GAP SERPL CALC-SCNC: 8 MEQ/L
BASOPHILS # BLD AUTO: 0.06 X10(3)/MCL (ref 0–0.2)
BASOPHILS NFR BLD AUTO: 0.5 %
BUN SERPL-MCNC: 19.1 MG/DL (ref 8.4–25.7)
CALCIUM SERPL-MCNC: 9.5 MG/DL (ref 8.4–10.2)
CHLORIDE SERPL-SCNC: 101 MMOL/L (ref 98–107)
CO2 SERPL-SCNC: 27 MMOL/L (ref 22–29)
CREAT SERPL-MCNC: 0.99 MG/DL (ref 0.73–1.18)
CREAT/UREA NIT SERPL: 19
EOSINOPHIL # BLD AUTO: 0.3 X10(3)/MCL (ref 0–0.9)
EOSINOPHIL NFR BLD AUTO: 2.3 %
ERYTHROCYTE [DISTWIDTH] IN BLOOD BY AUTOMATED COUNT: 12.9 % (ref 11.5–17)
GLUCOSE SERPL-MCNC: 109 MG/DL (ref 74–100)
HCT VFR BLD AUTO: 45.9 % (ref 42–52)
HGB BLD-MCNC: 15.1 GM/DL (ref 14–18)
IMM GRANULOCYTES # BLD AUTO: 0.05 X10(3)/MCL (ref 0–0.02)
IMM GRANULOCYTES NFR BLD AUTO: 0.4 % (ref 0–0.43)
LYMPHOCYTES # BLD AUTO: 2.43 X10(3)/MCL (ref 0.6–4.6)
LYMPHOCYTES NFR BLD AUTO: 18.7 %
MCH RBC QN AUTO: 30.8 PG (ref 27–31)
MCHC RBC AUTO-ENTMCNC: 32.9 MG/DL (ref 33–36)
MCV RBC AUTO: 93.7 FL (ref 80–94)
MONOCYTES # BLD AUTO: 1.08 X10(3)/MCL (ref 0.1–1.3)
MONOCYTES NFR BLD AUTO: 8.3 %
NEUTROPHILS # BLD AUTO: 9.1 X10(3)/MCL (ref 2.1–9.2)
NEUTROPHILS NFR BLD AUTO: 69.8 %
NRBC BLD AUTO-RTO: 0 %
PLATELET # BLD AUTO: 276 X10(3)/MCL (ref 130–400)
PMV BLD AUTO: 9.2 FL (ref 9.4–12.4)
POTASSIUM SERPL-SCNC: 4.6 MMOL/L (ref 3.5–5.1)
RBC # BLD AUTO: 4.9 X10(6)/MCL (ref 4.7–6.1)
SARS-COV-2 RDRP RESP QL NAA+PROBE: NEGATIVE
SODIUM SERPL-SCNC: 136 MMOL/L (ref 136–145)
WBC # SPEC AUTO: 13 X10(3)/MCL (ref 4.5–11.5)

## 2022-05-18 PROCEDURE — 63600175 PHARM REV CODE 636 W HCPCS

## 2022-05-18 PROCEDURE — 63600175 PHARM REV CODE 636 W HCPCS: Performed by: ANESTHESIOLOGY

## 2022-05-18 PROCEDURE — 63600175 PHARM REV CODE 636 W HCPCS: Performed by: NURSE ANESTHETIST, CERTIFIED REGISTERED

## 2022-05-18 PROCEDURE — 25000003 PHARM REV CODE 250: Performed by: NURSE ANESTHETIST, CERTIFIED REGISTERED

## 2022-05-18 PROCEDURE — 21400001 HC TELEMETRY ROOM

## 2022-05-18 PROCEDURE — 25000003 PHARM REV CODE 250: Performed by: STUDENT IN AN ORGANIZED HEALTH CARE EDUCATION/TRAINING PROGRAM

## 2022-05-18 PROCEDURE — 63600175 PHARM REV CODE 636 W HCPCS: Performed by: STUDENT IN AN ORGANIZED HEALTH CARE EDUCATION/TRAINING PROGRAM

## 2022-05-18 PROCEDURE — 96372 THER/PROPH/DIAG INJ SC/IM: CPT | Performed by: STUDENT IN AN ORGANIZED HEALTH CARE EDUCATION/TRAINING PROGRAM

## 2022-05-18 PROCEDURE — 37000008 HC ANESTHESIA 1ST 15 MINUTES: Performed by: SURGERY

## 2022-05-18 PROCEDURE — 80048 BASIC METABOLIC PNL TOTAL CA: CPT | Performed by: STUDENT IN AN ORGANIZED HEALTH CARE EDUCATION/TRAINING PROGRAM

## 2022-05-18 PROCEDURE — G0378 HOSPITAL OBSERVATION PER HR: HCPCS

## 2022-05-18 PROCEDURE — 85025 COMPLETE CBC W/AUTO DIFF WBC: CPT | Performed by: STUDENT IN AN ORGANIZED HEALTH CARE EDUCATION/TRAINING PROGRAM

## 2022-05-18 PROCEDURE — 36415 COLL VENOUS BLD VENIPUNCTURE: CPT | Performed by: STUDENT IN AN ORGANIZED HEALTH CARE EDUCATION/TRAINING PROGRAM

## 2022-05-18 PROCEDURE — 94761 N-INVAS EAR/PLS OXIMETRY MLT: CPT

## 2022-05-18 PROCEDURE — 36415 COLL VENOUS BLD VENIPUNCTURE: CPT | Performed by: FAMILY MEDICINE

## 2022-05-18 PROCEDURE — 87635 SARS-COV-2 COVID-19 AMP PRB: CPT | Performed by: STUDENT IN AN ORGANIZED HEALTH CARE EDUCATION/TRAINING PROGRAM

## 2022-05-18 PROCEDURE — 36000707: Performed by: SURGERY

## 2022-05-18 PROCEDURE — 71000033 HC RECOVERY, INTIAL HOUR: Performed by: SURGERY

## 2022-05-18 PROCEDURE — 11000001 HC ACUTE MED/SURG PRIVATE ROOM

## 2022-05-18 PROCEDURE — 27201423 OPTIME MED/SURG SUP & DEVICES STERILE SUPPLY: Performed by: SURGERY

## 2022-05-18 PROCEDURE — S0030 INJECTION, METRONIDAZOLE: HCPCS | Performed by: STUDENT IN AN ORGANIZED HEALTH CARE EDUCATION/TRAINING PROGRAM

## 2022-05-18 PROCEDURE — 36000706: Performed by: SURGERY

## 2022-05-18 PROCEDURE — 37000009 HC ANESTHESIA EA ADD 15 MINS: Performed by: SURGERY

## 2022-05-18 RX ORDER — OXYCODONE HYDROCHLORIDE 5 MG/1
5 TABLET ORAL
Status: DISCONTINUED | OUTPATIENT
Start: 2022-05-18 | End: 2022-05-18

## 2022-05-18 RX ORDER — CEFAZOLIN SODIUM 2 G/50ML
2 SOLUTION INTRAVENOUS
Status: DISCONTINUED | OUTPATIENT
Start: 2022-05-18 | End: 2022-05-19

## 2022-05-18 RX ORDER — PROPOFOL 10 MG/ML
VIAL (ML) INTRAVENOUS
Status: DISCONTINUED | OUTPATIENT
Start: 2022-05-18 | End: 2022-06-07

## 2022-05-18 RX ORDER — TALC
6 POWDER (GRAM) TOPICAL NIGHTLY PRN
Status: DISCONTINUED | OUTPATIENT
Start: 2022-05-18 | End: 2022-05-27 | Stop reason: HOSPADM

## 2022-05-18 RX ORDER — SODIUM CHLORIDE, SODIUM LACTATE, POTASSIUM CHLORIDE, CALCIUM CHLORIDE 600; 310; 30; 20 MG/100ML; MG/100ML; MG/100ML; MG/100ML
INJECTION, SOLUTION INTRAVENOUS CONTINUOUS
Status: DISCONTINUED | OUTPATIENT
Start: 2022-05-18 | End: 2022-05-25

## 2022-05-18 RX ORDER — ONDANSETRON 2 MG/ML
4 INJECTION INTRAMUSCULAR; INTRAVENOUS DAILY PRN
Status: DISCONTINUED | OUTPATIENT
Start: 2022-05-18 | End: 2022-05-18

## 2022-05-18 RX ORDER — MIDAZOLAM HYDROCHLORIDE 1 MG/ML
INJECTION INTRAMUSCULAR; INTRAVENOUS
Status: DISPENSED
Start: 2022-05-18 | End: 2022-05-18

## 2022-05-18 RX ORDER — HYDROMORPHONE HYDROCHLORIDE 1 MG/ML
INJECTION, SOLUTION INTRAMUSCULAR; INTRAVENOUS; SUBCUTANEOUS
Status: DISCONTINUED | OUTPATIENT
Start: 2022-05-18 | End: 2022-06-07

## 2022-05-18 RX ORDER — MORPHINE SULFATE 10 MG/ML
2 INJECTION INTRAMUSCULAR; INTRAVENOUS; SUBCUTANEOUS EVERY 5 MIN PRN
Status: DISCONTINUED | OUTPATIENT
Start: 2022-05-18 | End: 2022-05-18

## 2022-05-18 RX ORDER — ACETAMINOPHEN 325 MG/1
650 TABLET ORAL EVERY 8 HOURS PRN
Status: DISCONTINUED | OUTPATIENT
Start: 2022-05-18 | End: 2022-05-19

## 2022-05-18 RX ORDER — CEFAZOLIN SODIUM 1 G/3ML
INJECTION, POWDER, FOR SOLUTION INTRAMUSCULAR; INTRAVENOUS
Status: DISCONTINUED | OUTPATIENT
Start: 2022-05-18 | End: 2022-06-07

## 2022-05-18 RX ORDER — LIDOCAINE HYDROCHLORIDE 20 MG/ML
INJECTION, SOLUTION EPIDURAL; INFILTRATION; INTRACAUDAL; PERINEURAL
Status: DISCONTINUED | OUTPATIENT
Start: 2022-05-18 | End: 2022-06-07

## 2022-05-18 RX ORDER — MORPHINE SULFATE 2 MG/ML
4 INJECTION, SOLUTION INTRAMUSCULAR; INTRAVENOUS EVERY 4 HOURS PRN
Status: DISCONTINUED | OUTPATIENT
Start: 2022-05-18 | End: 2022-05-27 | Stop reason: HOSPADM

## 2022-05-18 RX ORDER — DEXAMETHASONE SODIUM PHOSPHATE 4 MG/ML
INJECTION, SOLUTION INTRA-ARTICULAR; INTRALESIONAL; INTRAMUSCULAR; INTRAVENOUS; SOFT TISSUE
Status: DISCONTINUED | OUTPATIENT
Start: 2022-05-18 | End: 2022-06-07

## 2022-05-18 RX ORDER — HEPARIN SODIUM 5000 [USP'U]/ML
5000 INJECTION, SOLUTION INTRAVENOUS; SUBCUTANEOUS EVERY 8 HOURS
Status: DISCONTINUED | OUTPATIENT
Start: 2022-05-18 | End: 2022-05-27 | Stop reason: HOSPADM

## 2022-05-18 RX ORDER — PHENYLEPHRINE HYDROCHLORIDE 10 MG/ML
INJECTION INTRAVENOUS
Status: DISCONTINUED | OUTPATIENT
Start: 2022-05-18 | End: 2022-06-07

## 2022-05-18 RX ORDER — GLYCOPYRROLATE 0.2 MG/ML
INJECTION INTRAMUSCULAR; INTRAVENOUS
Status: DISCONTINUED | OUTPATIENT
Start: 2022-05-18 | End: 2022-06-07

## 2022-05-18 RX ORDER — MEPERIDINE HYDROCHLORIDE 25 MG/ML
INJECTION INTRAMUSCULAR; INTRAVENOUS; SUBCUTANEOUS
Status: DISCONTINUED
Start: 2022-05-18 | End: 2022-05-18 | Stop reason: WASHOUT

## 2022-05-18 RX ORDER — ACETAMINOPHEN 325 MG/1
650 TABLET ORAL EVERY 4 HOURS PRN
Status: DISCONTINUED | OUTPATIENT
Start: 2022-05-18 | End: 2022-05-19

## 2022-05-18 RX ORDER — MIDAZOLAM HYDROCHLORIDE 1 MG/ML
2 INJECTION INTRAMUSCULAR; INTRAVENOUS ONCE AS NEEDED
Status: COMPLETED | OUTPATIENT
Start: 2022-05-18 | End: 2022-05-18

## 2022-05-18 RX ORDER — ROCURONIUM BROMIDE 10 MG/ML
INJECTION, SOLUTION INTRAVENOUS
Status: DISCONTINUED | OUTPATIENT
Start: 2022-05-18 | End: 2022-06-07

## 2022-05-18 RX ORDER — METRONIDAZOLE 500 MG/100ML
500 INJECTION, SOLUTION INTRAVENOUS
Status: COMPLETED | OUTPATIENT
Start: 2022-05-18 | End: 2022-05-18

## 2022-05-18 RX ORDER — METRONIDAZOLE 500 MG/100ML
500 INJECTION, SOLUTION INTRAVENOUS
Status: DISCONTINUED | OUTPATIENT
Start: 2022-05-18 | End: 2022-05-19

## 2022-05-18 RX ORDER — EPHEDRINE SULFATE 50 MG/ML
INJECTION, SOLUTION INTRAVENOUS
Status: DISCONTINUED | OUTPATIENT
Start: 2022-05-18 | End: 2022-06-07

## 2022-05-18 RX ORDER — PROMETHAZINE HYDROCHLORIDE 25 MG/ML
12.5 INJECTION, SOLUTION INTRAMUSCULAR; INTRAVENOUS ONCE
Status: DISCONTINUED | OUTPATIENT
Start: 2022-05-18 | End: 2022-05-18

## 2022-05-18 RX ORDER — PROMETHAZINE HYDROCHLORIDE 25 MG/ML
INJECTION, SOLUTION INTRAMUSCULAR; INTRAVENOUS
Status: DISCONTINUED
Start: 2022-05-18 | End: 2022-05-18 | Stop reason: WASHOUT

## 2022-05-18 RX ORDER — LIDOCAINE HYDROCHLORIDE 10 MG/ML
1 INJECTION, SOLUTION EPIDURAL; INFILTRATION; INTRACAUDAL; PERINEURAL ONCE
Status: COMPLETED | OUTPATIENT
Start: 2022-05-18 | End: 2022-05-18

## 2022-05-18 RX ORDER — OXYCODONE HYDROCHLORIDE 5 MG/1
10 TABLET ORAL EVERY 4 HOURS PRN
Status: DISCONTINUED | OUTPATIENT
Start: 2022-05-18 | End: 2022-05-27 | Stop reason: HOSPADM

## 2022-05-18 RX ORDER — ONDANSETRON 2 MG/ML
4 INJECTION INTRAMUSCULAR; INTRAVENOUS EVERY 12 HOURS PRN
Status: DISCONTINUED | OUTPATIENT
Start: 2022-05-18 | End: 2022-05-27 | Stop reason: HOSPADM

## 2022-05-18 RX ORDER — MORPHINE SULFATE 10 MG/ML
INJECTION INTRAMUSCULAR; INTRAVENOUS; SUBCUTANEOUS
Status: COMPLETED
Start: 2022-05-18 | End: 2022-05-18

## 2022-05-18 RX ORDER — SODIUM CHLORIDE, SODIUM LACTATE, POTASSIUM CHLORIDE, CALCIUM CHLORIDE 600; 310; 30; 20 MG/100ML; MG/100ML; MG/100ML; MG/100ML
INJECTION, SOLUTION INTRAVENOUS CONTINUOUS
Status: DISCONTINUED | OUTPATIENT
Start: 2022-05-18 | End: 2022-05-18

## 2022-05-18 RX ORDER — MEPERIDINE HYDROCHLORIDE 25 MG/ML
12.5 INJECTION INTRAMUSCULAR; INTRAVENOUS; SUBCUTANEOUS EVERY 10 MIN PRN
Status: DISCONTINUED | OUTPATIENT
Start: 2022-05-18 | End: 2022-05-18

## 2022-05-18 RX ORDER — PROCHLORPERAZINE EDISYLATE 5 MG/ML
5 INJECTION INTRAMUSCULAR; INTRAVENOUS EVERY 6 HOURS PRN
Status: DISCONTINUED | OUTPATIENT
Start: 2022-05-18 | End: 2022-05-25

## 2022-05-18 RX ORDER — FENTANYL CITRATE 50 UG/ML
INJECTION, SOLUTION INTRAMUSCULAR; INTRAVENOUS
Status: DISCONTINUED | OUTPATIENT
Start: 2022-05-18 | End: 2022-06-07

## 2022-05-18 RX ORDER — KETOROLAC TROMETHAMINE 30 MG/ML
INJECTION, SOLUTION INTRAMUSCULAR; INTRAVENOUS
Status: DISCONTINUED | OUTPATIENT
Start: 2022-05-18 | End: 2022-06-07

## 2022-05-18 RX ORDER — TRAMADOL HYDROCHLORIDE 50 MG/1
50 TABLET ORAL EVERY 6 HOURS PRN
Status: DISCONTINUED | OUTPATIENT
Start: 2022-05-18 | End: 2022-05-23

## 2022-05-18 RX ORDER — CEFAZOLIN SODIUM 2 G/50ML
2 SOLUTION INTRAVENOUS
Status: DISPENSED | OUTPATIENT
Start: 2022-05-18 | End: 2022-05-18

## 2022-05-18 RX ORDER — SODIUM CHLORIDE 0.9 % (FLUSH) 0.9 %
10 SYRINGE (ML) INJECTION
Status: DISCONTINUED | OUTPATIENT
Start: 2022-05-18 | End: 2022-05-27 | Stop reason: HOSPADM

## 2022-05-18 RX ORDER — NEOSTIGMINE METHYLSULFATE 1 MG/ML
INJECTION, SOLUTION INTRAVENOUS
Status: DISCONTINUED | OUTPATIENT
Start: 2022-05-18 | End: 2022-06-07

## 2022-05-18 RX ADMIN — NEOSTIGMINE METHYLSULFATE 5 MG: 1 INJECTION INTRAVENOUS at 03:05

## 2022-05-18 RX ADMIN — ROCURONIUM BROMIDE 10 MG: 10 SOLUTION INTRAVENOUS at 02:05

## 2022-05-18 RX ADMIN — SODIUM CHLORIDE, POTASSIUM CHLORIDE, SODIUM LACTATE AND CALCIUM CHLORIDE: 600; 310; 30; 20 INJECTION, SOLUTION INTRAVENOUS at 01:05

## 2022-05-18 RX ADMIN — PHENYLEPHRINE HYDROCHLORIDE 200 MCG: 10 INJECTION INTRAVENOUS at 01:05

## 2022-05-18 RX ADMIN — GLYCOPYRROLATE 0.8 MG: 0.2 INJECTION INTRAMUSCULAR; INTRAVENOUS at 03:05

## 2022-05-18 RX ADMIN — MELATONIN TAB 3 MG 6 MG: 3 TAB at 09:05

## 2022-05-18 RX ADMIN — LIDOCAINE HYDROCHLORIDE 100 MG: 20 INJECTION, SOLUTION EPIDURAL; INFILTRATION; INTRACAUDAL; PERINEURAL at 12:05

## 2022-05-18 RX ADMIN — KETOROLAC TROMETHAMINE 30 MG: 30 INJECTION, SOLUTION INTRAMUSCULAR; INTRAVENOUS at 02:05

## 2022-05-18 RX ADMIN — HYDROMORPHONE HYDROCHLORIDE 0.25 MCG: 1 INJECTION, SOLUTION INTRAMUSCULAR; INTRAVENOUS; SUBCUTANEOUS at 04:05

## 2022-05-18 RX ADMIN — SODIUM CHLORIDE, POTASSIUM CHLORIDE, SODIUM LACTATE AND CALCIUM CHLORIDE: 600; 310; 30; 20 INJECTION, SOLUTION INTRAVENOUS at 12:05

## 2022-05-18 RX ADMIN — LIDOCAINE HYDROCHLORIDE 10 MG: 10 INJECTION, SOLUTION EPIDURAL; INFILTRATION; INTRACAUDAL; PERINEURAL at 02:05

## 2022-05-18 RX ADMIN — MIDAZOLAM 2 MG: 1 INJECTION INTRAMUSCULAR; INTRAVENOUS at 11:05

## 2022-05-18 RX ADMIN — ONDANSETRON 4 MG: 2 INJECTION INTRAMUSCULAR; INTRAVENOUS at 02:05

## 2022-05-18 RX ADMIN — ROCURONIUM BROMIDE 20 MG: 10 SOLUTION INTRAVENOUS at 01:05

## 2022-05-18 RX ADMIN — CEFAZOLIN 2 G: 330 INJECTION, POWDER, FOR SOLUTION INTRAMUSCULAR; INTRAVENOUS at 01:05

## 2022-05-18 RX ADMIN — PHENYLEPHRINE HYDROCHLORIDE 100 MCG: 10 INJECTION INTRAVENOUS at 03:05

## 2022-05-18 RX ADMIN — HEPARIN SODIUM 5000 UNITS: 5000 INJECTION INTRAVENOUS; SUBCUTANEOUS at 06:05

## 2022-05-18 RX ADMIN — SODIUM CHLORIDE, POTASSIUM CHLORIDE, SODIUM LACTATE AND CALCIUM CHLORIDE: 600; 310; 30; 20 INJECTION, SOLUTION INTRAVENOUS at 10:05

## 2022-05-18 RX ADMIN — FENTANYL CITRATE 25 MCG: 50 INJECTION INTRAMUSCULAR; INTRAVENOUS at 01:05

## 2022-05-18 RX ADMIN — METRONIDAZOLE 500 MG: 500 INJECTION, SOLUTION INTRAVENOUS at 01:05

## 2022-05-18 RX ADMIN — SODIUM CHLORIDE, POTASSIUM CHLORIDE, SODIUM LACTATE AND CALCIUM CHLORIDE: 600; 310; 30; 20 INJECTION, SOLUTION INTRAVENOUS at 05:05

## 2022-05-18 RX ADMIN — MORPHINE SULFATE 2 MG: 10 INJECTION, SOLUTION INTRAMUSCULAR; INTRAVENOUS at 04:05

## 2022-05-18 RX ADMIN — FENTANYL CITRATE 50 MCG: 50 INJECTION INTRAMUSCULAR; INTRAVENOUS at 01:05

## 2022-05-18 RX ADMIN — FENTANYL CITRATE 25 MCG: 50 INJECTION INTRAMUSCULAR; INTRAVENOUS at 03:05

## 2022-05-18 RX ADMIN — OXYCODONE HYDROCHLORIDE 10 MG: 5 TABLET ORAL at 05:05

## 2022-05-18 RX ADMIN — MORPHINE SULFATE 4 MG: 2 INJECTION, SOLUTION INTRAMUSCULAR; INTRAVENOUS at 05:05

## 2022-05-18 RX ADMIN — PHENYLEPHRINE HYDROCHLORIDE 200 MCG: 10 INJECTION INTRAVENOUS at 02:05

## 2022-05-18 RX ADMIN — METHOCARBAMOL 500 MG: 100 INJECTION INTRAMUSCULAR; INTRAVENOUS at 10:05

## 2022-05-18 RX ADMIN — OXYCODONE HYDROCHLORIDE 10 MG: 5 TABLET ORAL at 09:05

## 2022-05-18 RX ADMIN — EPHEDRINE SULFATE 10 MG: 50 INJECTION INTRAVENOUS at 03:05

## 2022-05-18 RX ADMIN — FENTANYL CITRATE 100 MCG: 50 INJECTION INTRAMUSCULAR; INTRAVENOUS at 12:05

## 2022-05-18 RX ADMIN — DEXAMETHASONE SODIUM PHOSPHATE 8 MG: 4 INJECTION, SOLUTION INTRA-ARTICULAR; INTRALESIONAL; INTRAMUSCULAR; INTRAVENOUS; SOFT TISSUE at 01:05

## 2022-05-18 RX ADMIN — HEPARIN SODIUM 5000 UNITS: 5000 INJECTION INTRAVENOUS; SUBCUTANEOUS at 05:05

## 2022-05-18 RX ADMIN — OXYCODONE HYDROCHLORIDE 10 MG: 5 TABLET ORAL at 01:05

## 2022-05-18 RX ADMIN — PROPOFOL 170 MG: 10 INJECTION, EMULSION INTRAVENOUS at 12:05

## 2022-05-18 RX ADMIN — ROCURONIUM BROMIDE 50 MG: 10 SOLUTION INTRAVENOUS at 12:05

## 2022-05-18 NOTE — ANESTHESIA PREPROCEDURE EVALUATION
"                                                                                                             05/18/2022  Isaías Prasad is a 56 y.o., male with HTN, smoking, h/o ETOH abuse, h/o PSA, h/o TIA presents for foreign body removal from rectum.    COVID vaccine x 2 (last dose 9/21/21)    NO BETA BLOCKER USE    Active Ambulatory Problems     Diagnosis Date Noted    No Active Ambulatory Problems     Resolved Ambulatory Problems     Diagnosis Date Noted    No Resolved Ambulatory Problems     No Additional Past Medical History           Pre-op Assessment    I have reviewed the NPO Status.      Review of Systems  Anesthesia Hx:  No previous Anesthesia    Social:  Smoker, Alcohol Use    Cardiovascular:   Hypertension    Pulmonary:  Pulmonary Normal    Renal/:  Renal/ Normal     Hepatic/GI:  Hepatic/GI Normal    Neurological:   TIA,    Endocrine:  Endocrine Normal      Vitals:    05/17/22 2317 05/18/22 0005 05/18/22 0150 05/18/22 0414   BP: 112/74   106/71   BP Location: Right arm      Patient Position: Lying      Pulse: 98   (!) 56   Resp: 20  18    Temp: 36.7 °C (98.1 °F)   37 °C (98.6 °F)   TempSrc: Tympanic      SpO2: 98% 98%     Weight: 83.9 kg (185 lb)      Height: 6' 2" (1.88 m)        Vitals:    05/18/22 0150 05/18/22 0414 05/18/22 0715 05/18/22 1055   BP:  106/71 135/76 125/75   BP Location:       Patient Position:    Lying   Pulse:  (!) 56 76 78   Resp: 18  18 20   Temp:  37 °C (98.6 °F)  36 °C (96.8 °F)   TempSrc:    Temporal   SpO2:    100%   Weight:       Height:           Physical Exam  General: Alert, Cooperative and Well nourished    Airway:  Mallampati: II   Mouth Opening: Normal  TM Distance: Normal  Tongue: Normal  Neck ROM: Normal ROM    Dental:  Intact    Chest/Lungs:  Clear to auscultation, Normal Respiratory Rate    Heart:  Rate: Normal  Rhythm: Regular Rhythm  Sounds: Normal      Lab Results   Component Value Date    WBC 13.0 (H) 05/18/2022    HGB 15.1 05/18/2022    HCT 45.9 " 05/18/2022    MCV 93.7 05/18/2022     05/18/2022       CMP  Sodium Level   Date Value Ref Range Status   05/18/2022 136 136 - 145 mmol/L Final     Potassium Level   Date Value Ref Range Status   05/18/2022 4.6 3.5 - 5.1 mmol/L Final     Carbon Dioxide   Date Value Ref Range Status   05/18/2022 27 22 - 29 mmol/L Final     Blood Urea Nitrogen   Date Value Ref Range Status   05/18/2022 19.1 8.4 - 25.7 mg/dL Final     Creatinine   Date Value Ref Range Status   05/18/2022 0.99 0.73 - 1.18 mg/dL Final     Calcium Level Total   Date Value Ref Range Status   05/18/2022 9.5 8.4 - 10.2 mg/dL Final     Albumin Level   Date Value Ref Range Status   10/26/2019 3.6 3.4 - 5.0 gm/dL Final     Bilirubin Total   Date Value Ref Range Status   10/26/2019 0.5 0.2 - 1.0 mg/dL Final     Alkaline Phosphatase   Date Value Ref Range Status   10/26/2019 102 45 - 117 unit/L Final     Aspartate Aminotransferase   Date Value Ref Range Status   10/26/2019 18 15 - 37 unit/L Final     Alanine Aminotransferase   Date Value Ref Range Status   10/26/2019 23 12 - 78 unit/L Final     Estimated GFR-Non    Date Value Ref Range Status   05/18/2022 >60 mls/min/1.73/m2 Final         Anesthesia Plan  Type of Anesthesia, risks & benefits discussed:    Anesthesia Type: Gen Supraglottic Airway  Intra-op Monitoring Plan: Standard ASA Monitors  Post Op Pain Control Plan: IV/PO Opioids PRN  Induction:  IV  Airway Plan: Direct  Informed Consent: Informed consent signed with the Patient and all parties understand the risks and agree with anesthesia plan.  All questions answered.   ASA Score: 3  Day of Surgery Review of History & Physical: H&P Update referred to the surgeon/provider.    Ready For Surgery From Anesthesia Perspective.     .

## 2022-05-18 NOTE — H&P
"[facility] - [facility]  Admission H&P     CC: Foreign body in rectum        Subjective:      HPI: 55 yo male who presents due to foreign body in rectum. He notes two days ago that he "slipped and fell off the roof and landed on an object which may have glass". He waited until today to present as he was hoping it would pass on its own. He notes last PO intake of water a few hours earlier.    PMH:  History reviewed. No pertinent past medical history.      PSH:  History reviewed. No pertinent surgical history.      Medications:  No current facility-administered medications on file prior to encounter.     No current outpatient medications on file prior to encounter.        Allergies:  Review of patient's allergies indicates:  No Known Allergies      Social Hx:  Social History     Tobacco Use   Smoking Status Not on file   Smokeless Tobacco Not on file      Social History     Substance and Sexual Activity   Alcohol Use None         Relevant Family Hx:  History reviewed. No pertinent family history.      Objective:     Vitals:  Vitals:    05/17/22 2317   BP: 112/74   BP Location: Right arm   Patient Position: Lying   Pulse: 98   Resp: 20   Temp: 98.1 °F (36.7 °C)   TempSrc: Tympanic   SpO2: 98%   Weight: 83.9 kg (185 lb)   Height: 6' 2" (1.88 m)       Physical Exam:  Gen: NAD  Neuro: awake, alert, answering questions appropriately  Resp: non-labored breathing  : Patient has foreign body palpable on rectal exam.  Ext: moves all 4 spontaneously and purposefully  Skin: warm, well perfused        Imaging:  X-ray Abdomen:  Evidence of foreign body within the area of the rectum measuring 10 cm by 5 cm.      Assessment/Plan:  55 yo male with no pertinent medical history presents with foreign body in rectum unable to be removed at bedside.    - Place patient in observation  - NPO   - IV fluids   - OR for EUA and removal of foreign body later this morning  - prn pain control  - SQ       Skylar Hernandez MD   [facility] " General Surgery, PGY1  05/18/2022 12:49 AM       PGY3 Addendum:  I examined the patient w/ Dr. Hernandez, and I agree w/ her documentation above. My brief assessment is below:    55 yo M w/ no PMH/PSH p/w rectal foreign body. Reportedly part of a light fixture. Palpable on rectal exam, but unable to remove. OR today for removal under anesthesia. Patient understands ex-lap may be necessary if foreign object unable to be removed per rectum.    Alexis Scheuermann, MD   [facility], PGY3  05/18/2022 7:08 AM

## 2022-05-18 NOTE — TRANSFER OF CARE
"Anesthesia Transfer of Care Note    Patient: Isaías Prasad    Procedure(s) Performed: Procedure(s) (LRB):  Exam under anesthesia, removal of rectal foreign body (N/A)  LAPAROTOMY, EXPLORATORY (N/A)    Patient location: PACU    Anesthesia Type: general    Transport from OR: Transported from OR on room air with adequate spontaneous ventilation    Post pain: adequate analgesia    Post assessment: no apparent anesthetic complications    Post vital signs: stable    Level of consciousness: awake    Complications: none    Transfer of care protocol was followed      Last vitals:   Visit Vitals  /75 (Patient Position: Lying)   Pulse 78   Temp 36 °C (96.8 °F) (Temporal)   Resp 20   Ht 6' 2" (1.88 m)   Wt 83.9 kg (185 lb)   SpO2 100%   BMI 23.75 kg/m²     "

## 2022-05-18 NOTE — ED PROVIDER NOTES
Encounter Date: 5/17/2022       History     Chief Complaint   Patient presents with    Foreign Body in Rectum     Reports fell off roof onto glass object on ground and entered rectum, yesterday.     56-year-old gentleman presents to the emergency room with complaints of a foreign body in his rectum.  Patient reports yesterday morning he was at work, and slipped off the roof, and had a foreign body into his rectum.  Patient says that he does not like doctors and was hoping to be able to pass this himself at home.  Denies fever chills.  Denies nausea or vomiting.  Has not had a bowel movement since this occurred.  Patient says that he was wearing shorts when this occurred.  Denies any bleeding.        Review of patient's allergies indicates:  No Known Allergies  History reviewed. No pertinent past medical history.  History reviewed. No pertinent surgical history.  History reviewed. No pertinent family history.     Review of Systems   Constitutional: Negative for chills, fatigue and fever.   HENT: Negative for ear pain, rhinorrhea and sore throat.    Eyes: Negative for photophobia and pain.   Respiratory: Negative for cough, shortness of breath and wheezing.    Cardiovascular: Negative for chest pain.   Gastrointestinal: Negative for abdominal pain, diarrhea, nausea and vomiting.   Genitourinary: Negative for dysuria.   Neurological: Negative for dizziness, weakness and headaches.   All other systems reviewed and are negative.      Physical Exam     Initial Vitals [05/17/22 2317]   BP Pulse Resp Temp SpO2   112/74 98 20 98.1 °F (36.7 °C) 98 %      MAP       --         Physical Exam    Nursing note and vitals reviewed.  Constitutional: He appears well-developed and well-nourished.   HENT:   Head: Normocephalic and atraumatic.   Eyes: EOM are normal. Pupils are equal, round, and reactive to light.   Neck: Neck supple.   Normal range of motion.  Cardiovascular: Normal rate, regular rhythm and normal heart sounds. Exam  reveals no gallop and no friction rub.    No murmur heard.  Pulmonary/Chest: Breath sounds normal. No respiratory distress.   Abdominal: Abdomen is soft. Bowel sounds are normal. He exhibits no distension. There is no abdominal tenderness.   On rectal examination, on no significant trauma appreciated.  Able to palpate the object on digital rectal examination.   Musculoskeletal:         General: Normal range of motion.      Cervical back: Normal range of motion and neck supple.     Neurological: He is alert and oriented to person, place, and time. He has normal strength.   Skin: Skin is warm and dry.   Psychiatric: He has a normal mood and affect. His behavior is normal. Judgment and thought content normal.         ED Course   Procedures  Labs Reviewed   SARS-COV-2 RNA AMPLIFICATION, QUAL   SARS-COV-2 RDRP GENE          Imaging Results          X-Ray Abdomen Flat And Erect (Preliminary result)  Result time 05/17/22 23:55:03    ED Interpretation by Garrett Morejon MD (05/17/22 23:55:03, [facility], Emergency Medicine)    Rectal foreign body                               Medications   LIDOcaine (PF) 10 mg/ml (1%) injection 10 mg (has no administration in time range)   sodium chloride 0.9% flush 10 mL (has no administration in time range)   melatonin tablet 6 mg (has no administration in time range)   acetaminophen tablet 650 mg (has no administration in time range)   heparin (porcine) injection 5,000 Units (has no administration in time range)   metronidazole IVPB 500 mg (has no administration in time range)   acetaminophen tablet 650 mg (has no administration in time range)   traMADoL tablet 50 mg (has no administration in time range)   oxyCODONE immediate release tablet 10 mg (10 mg Oral Given 5/18/22 0150)   ondansetron injection 4 mg (has no administration in time range)   prochlorperazine injection Soln 5 mg (has no administration in time range)   lactated ringers infusion (  Intravenous New Bag 5/18/22 0149)     Medical Decision Making:   Initial Assessment:   Object reported to be class, did not break upon insertion per the patient.  Able to palpate the metal in her screw portion of the object on digital rectal examination, but due to the large size, did not feel comfortable with being able to graft to remove the area.  Will discuss with General surgery for evaluation as the patient may need to have it removed under anesthesia.             ED Course as of 05/18/22 0207   Wed May 18, 2022   0206 General Surgery has seen and evaluated the patient.  Plan to take to the OR tomorrow. [MW]      ED Course User Index  [MW] Garrett Morejon MD             Clinical Impression:   Final diagnoses:  [T18.5XXA] Rectal foreign body  [T18.5XXA] Rectal foreign body, initial encounter (Primary)          ED Disposition Condition    Observation               Garrett Morejon MD  05/18/22 0207

## 2022-05-18 NOTE — OP NOTE
[facility] - Periop Services  General Surgery  Operative Note    SUMMARY     Date of Procedure: 2022     Procedure:   1. Exam under anesthesia  2. Exploratory laparotomy  3. Foreign body removal through anterior rectotomy  4. Diverting loop colostomy creation    Surgeon(s) and Role:  Staff: Chauncey Jimenez MD  Resident(s): Alexis Scheuermann, MD    Pre-Operative Diagnosis: Rectal foreign body    Post-Operative Diagnosis: Same    Anesthesia: GETA    Operative Findings: Large glass foreign body in rectum measuring 6x9 cm, unable to be removed through th anus    Description of Technical Procedures:   The patient was identified in the pre-op holding area by name, , and MRN. After verifying that written informed consent had been obtained, the patient was taken to the OR and placed on the table in lithotomy position. GETA was administered by anesthesia w/o complications.     A rectal exam was performed. A foreign body was easily palpable in the rectum, but was unable to be removed through the anus, even w/ manual abdominal pressure. At this point, the decision was made to explore the abdomen.     The abdomen was prepped and draped in the usual sterile fashion. A standard time-out was performed, again identifying the correct patient and procedure to be performed.     A #10 blade was used to make a lower midline skin incision. Bovie electrocautery was used to deepen the incision until the abdominal cavity was entered. The pelvis was explored and the foreign body was palpated low in the rectum below the peritoneal reflection. The foreign body was still not able to be manipulated so that it was deliverable via the anus.     At this point, and anterior longitudinal rectotomy was made over the foreign body, and the foreign body was able to be delivered through this incision. The foreign body was an approx 6x9 cm glass object. The pelvis was thoroughly irrigated w/ sterile saline. The rectotomy was closed in  two layers w/ PDS suture, then w/ silk Lembert sutures.     The descending colon was mobilized so that it reached the abdominal wall w/o tension. A circular incision was made in the L abdominal wall, and Bovie electrocautery was used to deepen this incision through the rectus muscle and the fascia. The mobilized sigmoid colon was brought up through this incision.     The abdominal fascia was closed w/ #1 PDS, and the skin was closed w/ staples the colostomy was matured w/ chromic suture. Sterile dressings and an ostomy appliance were applied.    The patient was awakened from anesthesia, extubated, and brought to PACU in a stable condition having suffered no untoward events.    All suture, needle, and instrument counts were correct x2 at the conclusion of the case.     Dr. Jimenez was present for all critical portions of the case.     Estimated Blood Loss (EBL): 50 cc           Implants: None    Drains: None    Specimens: None    Complications: None            Condition: Stable    Disposition: PACU    Alexis Scheuermann, MD   [facility] General Surgery, PGY3  05/18/2022 3:46 PM
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
## 2022-05-18 NOTE — BRIEF OP NOTE
[facility] - Periop Services  General Surgery  Brief Operative Note    SUMMARY     Date of Procedure: 05/18/2022     Procedure:   1. Exam under anesthesia  2. Exploratory laparotomy  3. Foreign body removal through anterior rectotomy  4. Diverting loop colostomy creation    Surgeon(s) and Role:  Staff: Chauncey Jimenez MD  Resident(s): Alexis Scheuermann, MD    Pre-Operative Diagnosis: Rectal foreign body    Post-Operative Diagnosis: Same    Anesthesia: GETA    Operative Findings: Large glass foreign body in rectum measuring 6x9 cm, unable to be removed through th anus    Description of Technical Procedures: Refer to full dictated operative report    Estimated Blood Loss (EBL): 50 cc           Implants: None    Drains: None    Specimens: None    Complications: None            Condition: Stable    Disposition: PACU    Alexis Scheuermann, MD   [facility] General Surgery, PGY3  05/18/2022 3:46 PM

## 2022-05-18 NOTE — ANESTHESIA PROCEDURE NOTES
Intubation    Date/Time: 5/18/2022 12:54 PM  Performed by: Humberto Pop CRNA  Authorized by: Humberto Pop CRNA     Intubation:     Induction:  Intravenous    Intubated:  Postinduction    Mask Ventilation:  Easy mask    Attempts:  1    Attempted By:  CRNA    Method of Intubation:  Direct    Blade:  [place] 2    Laryngeal View Grade: Grade I - full view of cords      Difficult Airway Encountered?: No      Complications:  None    Airway Device Size:  7.5    Style/Cuff Inflation:  Cuffed (inflated to minimal occlusive pressure)    Inflation Amount (mL):  6    Tube secured:  22    Secured at:  The lips    Placement Verified By:  Capnometry    Complicating Factors:  None    Findings Post-Intubation:  BS equal bilateral and atraumatic/condition of teeth unchanged

## 2022-05-19 LAB
ANION GAP SERPL CALC-SCNC: 8 MEQ/L
BASOPHILS # BLD AUTO: 0.05 X10(3)/MCL (ref 0–0.2)
BASOPHILS NFR BLD AUTO: 0.5 %
BUN SERPL-MCNC: 22.6 MG/DL (ref 8.4–25.7)
CALCIUM SERPL-MCNC: 8.6 MG/DL (ref 8.4–10.2)
CHLORIDE SERPL-SCNC: 102 MMOL/L (ref 98–107)
CO2 SERPL-SCNC: 24 MMOL/L (ref 22–29)
CREAT SERPL-MCNC: 1.22 MG/DL (ref 0.73–1.18)
CREAT/UREA NIT SERPL: 19
EOSINOPHIL # BLD AUTO: 0.17 X10(3)/MCL (ref 0–0.9)
EOSINOPHIL NFR BLD AUTO: 1.7 %
ERYTHROCYTE [DISTWIDTH] IN BLOOD BY AUTOMATED COUNT: 13.1 % (ref 11.5–17)
GLUCOSE SERPL-MCNC: 102 MG/DL (ref 74–100)
HCT VFR BLD AUTO: 38.9 % (ref 42–52)
HGB BLD-MCNC: 13.1 GM/DL (ref 14–18)
IMM GRANULOCYTES # BLD AUTO: 0.04 X10(3)/MCL (ref 0–0.02)
IMM GRANULOCYTES NFR BLD AUTO: 0.4 % (ref 0–0.43)
LYMPHOCYTES # BLD AUTO: 2.37 X10(3)/MCL (ref 0.6–4.6)
LYMPHOCYTES NFR BLD AUTO: 23.4 %
MCH RBC QN AUTO: 31.1 PG (ref 27–31)
MCHC RBC AUTO-ENTMCNC: 33.7 MG/DL (ref 33–36)
MCV RBC AUTO: 92.4 FL (ref 80–94)
MONOCYTES # BLD AUTO: 0.62 X10(3)/MCL (ref 0.1–1.3)
MONOCYTES NFR BLD AUTO: 6.1 %
NEUTROPHILS # BLD AUTO: 6.9 X10(3)/MCL (ref 2.1–9.2)
NEUTROPHILS NFR BLD AUTO: 67.9 %
NRBC BLD AUTO-RTO: 0 %
PLATELET # BLD AUTO: 220 X10(3)/MCL (ref 130–400)
PMV BLD AUTO: 9 FL (ref 9.4–12.4)
POTASSIUM SERPL-SCNC: 4 MMOL/L (ref 3.5–5.1)
RBC # BLD AUTO: 4.21 X10(6)/MCL (ref 4.7–6.1)
SODIUM SERPL-SCNC: 134 MMOL/L (ref 136–145)
WBC # SPEC AUTO: 10.1 X10(3)/MCL (ref 4.5–11.5)

## 2022-05-19 PROCEDURE — 80048 BASIC METABOLIC PNL TOTAL CA: CPT | Performed by: STUDENT IN AN ORGANIZED HEALTH CARE EDUCATION/TRAINING PROGRAM

## 2022-05-19 PROCEDURE — 25000003 PHARM REV CODE 250: Performed by: STUDENT IN AN ORGANIZED HEALTH CARE EDUCATION/TRAINING PROGRAM

## 2022-05-19 PROCEDURE — 21400001 HC TELEMETRY ROOM

## 2022-05-19 PROCEDURE — 94799 UNLISTED PULMONARY SVC/PX: CPT

## 2022-05-19 PROCEDURE — 63600175 PHARM REV CODE 636 W HCPCS: Performed by: STUDENT IN AN ORGANIZED HEALTH CARE EDUCATION/TRAINING PROGRAM

## 2022-05-19 PROCEDURE — 85025 COMPLETE CBC W/AUTO DIFF WBC: CPT | Performed by: STUDENT IN AN ORGANIZED HEALTH CARE EDUCATION/TRAINING PROGRAM

## 2022-05-19 PROCEDURE — 36415 COLL VENOUS BLD VENIPUNCTURE: CPT | Performed by: STUDENT IN AN ORGANIZED HEALTH CARE EDUCATION/TRAINING PROGRAM

## 2022-05-19 PROCEDURE — 94761 N-INVAS EAR/PLS OXIMETRY MLT: CPT

## 2022-05-19 PROCEDURE — 97162 PT EVAL MOD COMPLEX 30 MIN: CPT

## 2022-05-19 PROCEDURE — 11000001 HC ACUTE MED/SURG PRIVATE ROOM

## 2022-05-19 PROCEDURE — 99900035 HC TECH TIME PER 15 MIN (STAT)

## 2022-05-19 PROCEDURE — 96372 THER/PROPH/DIAG INJ SC/IM: CPT | Performed by: STUDENT IN AN ORGANIZED HEALTH CARE EDUCATION/TRAINING PROGRAM

## 2022-05-19 RX ORDER — ACETAMINOPHEN 325 MG/1
650 TABLET ORAL EVERY 6 HOURS SCHEDULED
Status: DISCONTINUED | OUTPATIENT
Start: 2022-05-19 | End: 2022-05-22

## 2022-05-19 RX ORDER — METHOCARBAMOL 500 MG/1
500 TABLET, FILM COATED ORAL 4 TIMES DAILY
Status: DISCONTINUED | OUTPATIENT
Start: 2022-05-19 | End: 2022-05-20

## 2022-05-19 RX ADMIN — TRAMADOL HYDROCHLORIDE 50 MG: 50 TABLET, COATED ORAL at 05:05

## 2022-05-19 RX ADMIN — MORPHINE SULFATE 4 MG: 2 INJECTION, SOLUTION INTRAMUSCULAR; INTRAVENOUS at 11:05

## 2022-05-19 RX ADMIN — OXYCODONE HYDROCHLORIDE 10 MG: 5 TABLET ORAL at 08:05

## 2022-05-19 RX ADMIN — HEPARIN SODIUM 5000 UNITS: 5000 INJECTION INTRAVENOUS; SUBCUTANEOUS at 05:05

## 2022-05-19 RX ADMIN — METHOCARBAMOL 500 MG: 500 TABLET, FILM COATED ORAL at 08:05

## 2022-05-19 RX ADMIN — METHOCARBAMOL 500 MG: 500 TABLET, FILM COATED ORAL at 05:05

## 2022-05-19 RX ADMIN — OXYCODONE HYDROCHLORIDE 10 MG: 5 TABLET ORAL at 02:05

## 2022-05-19 RX ADMIN — ACETAMINOPHEN 325MG 650 MG: 325 TABLET ORAL at 12:05

## 2022-05-19 RX ADMIN — MORPHINE SULFATE 4 MG: 2 INJECTION, SOLUTION INTRAMUSCULAR; INTRAVENOUS at 03:05

## 2022-05-19 RX ADMIN — MELATONIN TAB 3 MG 6 MG: 3 TAB at 10:05

## 2022-05-19 RX ADMIN — MORPHINE SULFATE 4 MG: 2 INJECTION, SOLUTION INTRAMUSCULAR; INTRAVENOUS at 05:05

## 2022-05-19 RX ADMIN — HEPARIN SODIUM 5000 UNITS: 5000 INJECTION INTRAVENOUS; SUBCUTANEOUS at 08:05

## 2022-05-19 RX ADMIN — METHOCARBAMOL 500 MG: 500 TABLET, FILM COATED ORAL at 10:05

## 2022-05-19 RX ADMIN — METHOCARBAMOL 500 MG: 500 TABLET, FILM COATED ORAL at 12:05

## 2022-05-19 RX ADMIN — ACETAMINOPHEN 325MG 650 MG: 325 TABLET ORAL at 05:05

## 2022-05-19 RX ADMIN — HEPARIN SODIUM 5000 UNITS: 5000 INJECTION INTRAVENOUS; SUBCUTANEOUS at 10:05

## 2022-05-19 RX ADMIN — OXYCODONE HYDROCHLORIDE 10 MG: 5 TABLET ORAL at 10:05

## 2022-05-19 NOTE — MEDICAL/APP STUDENT
[facility] - Purple Team  Daily Progress Note    Subjective:  NAEON. Pt diffusely sore to the lower abdomen, well controlled w PRNs. Has been NPO since surgery yesterday. No nausea / vomiting. Passed about 500 mL of dark urine through lutz since placement w surgery.     Objective:    Vitals:  Vitals:    05/19/22 0625   BP: 97/62   Pulse: 82   Resp:    Temp: 98.9 °F (37.2 °C)        Intake/Output:  3700:550 mL  UOP: 550 mL    Physical Exam:  Gen: NAD  Neuro: awake, alert, answering questions appropriately  CV: RRR  Resp: non-labored breathing, CONCHITA  Abd: soft, ND, minimally tender. There is an ostomy site to the LLQ, draining a small amount of serosanguinous fluid.   Ext: moves all 4 spontaneously and purposefully  Skin: warm, well perfused    Assessment/Plan:  57 yo male with no pertinent medical history who presented with foreign body in rectum. Required ex-lap due to inability to remove manually. POD1 after surgical removal from rectum with ostomy placement.     - Transition to liquid diet  - IV fluids   - Pain control.    Jesse Ashton   [facility] Surgery, MS3  05/19/2022 6:27 AM

## 2022-05-19 NOTE — PT/OT/SLP EVAL
Physical Therapy Evaluation    Patient Name:  Isaías Prasad   MRN:  [id number]    Recommendations:     Discharge Recommendations:  home with home health   Discharge Equipment Recommendations: walker, rolling   Barriers to discharge: None    Assessment:     Isaías Prasad is a 56 y.o. male admitted with a medical diagnosis of s/p fall from roof with foreign body in rectum. S/p ex lap w/ colostomy  He presents with the following impairments/functional limitations:  impaired endurance, impaired self care skills, impaired functional mobilty, gait instability, impaired balance, pain limiting function.    Rehab Prognosis: Good; patient would benefit from acute skilled PT services to address these deficits and reach maximum level of function.    Recent Surgery: Procedure(s) (LRB):  Exam under anesthesia, removal of rectal foreign body (N/A)  LAPAROTOMY, EXPLORATORY (N/A) 1 Day Post-Op    Plan:     During this hospitalization, patient to be seen 1 x/week (3-5 x week) to address the identified rehab impairments via gait training, therapeutic activities, therapeutic exercises and progress toward the following goals:    · Plan of Care Expires:  06/18/22    Subjective     Chief Complaint: pain  Patient/Family Comments/goals: to return home  Pain/Comfort:  · Pain Rating 1: 8/10  · Location 1: abdomen  · Pain Addressed 1: Reposition, Cessation of Activity, Nurse notified (meds given at start of session)  · Pain Rating Post-Intervention 1: 10/10    Patients cultural, spiritual, Gnosticist conflicts given the current situation: no    Living Environment:  Pt lives with his daughter in a  home with 3 steps and R HR to enter. He has a tub shower combo but uses and outside shower  Prior to admission, patients level of function was (I).  Equipment used at home:  (pt does not use DME).  DME owned (not currently used): single point cane.  Upon discharge, patient will have assistance from daughter.    Objective:     Communicated with  nurse myles prior to session.  Patient found HOB elevated with peripheral IV, telemetry  upon PT entry to room.    General Precautions: Standard,  (post op)   Orthopedic Precautions:N/A   Braces: N/A  Respiratory pt on RA    Vitals:     05/19/22 pre session Post session -pt holding his breath 2* pain required increased time and vc for breathing   BP: 109/75 145/85   Pulse: 85 42   Ox sats 97 90       Exams:  · Cognitive Exam:  Patient is oriented to Person, Place, Time and Situation  · Sensation:    · -       Intact  light/touch BUE / LE  · Skin Integrity/Edema:      · -       Skin integrity: Visible skin intact  · RUE ROM: WFL  · RUE Strength: WFL  · LUE ROM: WFL  · LUE Strength: WFL  · RLE ROM: WFL  · RLE Strength: WFL  · LLE ROM: WFL  · LLE Strength: WFL    Functional Mobility:  · Bed Mobility:     · Rolling Left:  minimum assistance  · Supine > sit with min A for trunk elevation  · Sit > supine mod A for BLE elevation  ·   · Transfers:     · Sit to Stand:  minimum assistance with rolling walker from elevated bed  · Gait: . Patient ambulated 25 ft with Rolling Walker and minimal assistance using swing-to gait. Patient demonstrated decreased aleksandr, increased time in double stance, decreased velocity of limb motion, decreased step length and decreased stride length during gait due to impaired balance, pain and impaired postural control.  ·   · Balance: . Pt sits EOB with BUE support. Unable to move outside ADONAY. Requires increased BUE assist for support in standing    Therapeutic Activities and Exercises:  PT provided extensive ed on role of PT, POC, safety, sitting EOB for meals, ambulation with nursing (S) if possible and pain control. Pt and daughter expressed undersatnding      Patient left HOB elevated with all lines intact, bed alarm on, nurse shar notified and daughter present.    GOALS:   Multidisciplinary Problems     Physical Therapy Goals        Problem: Physical Therapy    Goal Priority Disciplines  Outcome Goal Variances Interventions   Physical Therapy Goal     PT, PT/OT      Description: Goals to be met by: discharge     Patient will increase functional independence with mobility by performin. Supine to sit with Modified [place]  2. Sit to supine with Modified [place]  3. Rolling to Left and Right with Modified [place].  4. Sit to stand transfer with Modified [place]  5. Gait  x 260 feet with  Supervision using Rolling Walker.   6. Ascend/descend 3 stair with right Handrails Minimal Assistance using No Assistive Device.                      History:     History reviewed. No pertinent past medical history.    History reviewed. No pertinent surgical history.    Time Tracking:     PT Received On: 22  PT Start Time: 839     PT Stop Time: 905  PT Total Time (min): 26 min     Billable Minutes: Evaluation 2022

## 2022-05-19 NOTE — PLAN OF CARE
[facility] pt, Michelle Kyra ([phone]). Updated pt's demographic information: [place] [address] ([phone]). Sig Other, Lisa Silva ([phone]). Pt has home BP Cuff. Left v/m for Ann-Marie Vasquez with [facility] at [phone] requesting M/D eligibility assessment, as pt uninsured.

## 2022-05-19 NOTE — PROGRESS NOTES
[facility] - Purple Team  Daily Progress Note    Subjective:  Afebrile, VSS. Reports pain controlled. Colostomy with sweat in bag. Lutz in place with 550cc output. Has not ambulated.    Objective:    Vitals:  Vitals:    05/19/22 0625   BP: 97/62   Pulse: 82   Resp:    Temp: 98.9 °F (37.2 °C)        Physical Exam:  Gen: NAD  Neuro: awake, alert, answering questions appropriately  CV: RR  Resp: non-labored breathing, CONCHITA  Abd: soft, ND, minimally tender. Stoma pink with sweat in pouch.  Ext: moves all 4 spontaneously and purposefully  Skin: warm, well perfused    Assessment/Plan:  55yo male with PMH of HTN who presented with rectal foreign body. Now s/p ex lap with removal through anterior rectotomy due to inability to remove through anus     - CLD, IVF  - Will discuss lutz removal with staff  - Multimodal pain control  - Wound care consulted for ostomy teaching  - PT, OOB, IS  - [facility]      Skylar Hernandez MD   [facility] General Surgery, PGY1  05/19/2022 6:27 AM

## 2022-05-19 NOTE — NURSING
Received permission from patient Isaías Prasad it was okay to speak with daughter Michelle Jordan about medical diagnosis, surgery performed on patient, and care that has been provided to him. Gave patient information on what type of surgery was performed and some further   post-op orders; I.E. no lifting/straining, splinting when needing to, monitoring for bleeding, fevers, and infections.

## 2022-05-19 NOTE — PROGRESS NOTES
"[facility] - [address] [place], [place] [address]etry  Adult Nutrition  Progress Note    SUMMARY       Recommendations    Recommendation/Intervention: 1. Cl liquid diet; ADAT to regular 2. will order boost breeze tid to help meet nutrient needs--250kcal; 9 gm protein per serving; 3. MVI/fe 4. biweekly wt  Will monitor diet progression/po intake/nutrition status    Assessment and Plan  Pt tolerating oral diet; no N/V; suggest diet progression as tolerated; usually good appetite; no wt loss; will order boost breeze to help meet nutrient needs while on cl liquids.       Reason for Assessment    Reason For Assessment: other (see comments) (nutrition dx)  Diagnosis: other (see comments) (rectal foreign body; (-18) S/P Exp Lap--diverting loop colostomy)  Relevant Medical History: none  General Information Comments: Pt S/P exp lap/diverting loop colostomy; started on cl liq diet earlier today; tolerating well; no N/V; pt reported was eating well PTA; no wt loss. will order boost breeze to better meet nutrient needs.    Nutrition Risk Screen    Nutrition Risk Screen: no indicators present    Nutrition/Diet History    Patient Reported Diet/Restrictions/Preferences: general  Typical Food/Fluid Intake: meals/ snacks  Spiritual, Cultural Beliefs, Sabianist Practices, Values that Affect Care: no  Food Allergies: NKFA  Factors Affecting Nutritional Intake: altered gastrointestinal function    Anthropometrics    Temp: 98.8 °F (37.1 °C)  Height Method: Stated  Height: 6' 2" (188 cm)  Height (inches): 74 in  Weight Method: Standard Scale  Weight: 83.9 kg (185 lb)  Weight (lb): 185 lb  Ideal Body Weight (IBW), Male: 190 lb  % Ideal Body Weight, Male (lb): 97.37 %  BMI (Calculated): 23.7  BMI Grade: 18.5-24.9 - normal  Usual Body Weight (UBW), k.9 kg  % Usual Body Weight: 100.23       Lab/Procedures/Meds    Pertinent Labs Reviewed: reviewed  Pertinent Labs Comments: (5-19) H/H 13.1/38.9 Gluc 102 Bun 22 Cr 1.2 K 4.0  Pertinent Medications " Reviewed: reviewed  Pertinent Medications Comments: acetaminophen; IVF LR @ 125ml/hr      Nutrition Prescription Ordered    Current Diet Order: cl liquid diet    Evaluation of Received Nutrient/Fluid Intake    Energy Calories Required: not meeting needs  Protein Required: not meeting needs  Fluid Required: not meeting needs  Comments: Current oral diet not meeting nutrient needs; pt reported usually eats well; tolerating cl liquids ; will order oral supplement to help meet nutrient needs.  Tolerance: tolerating  % Intake of Estimated Energy Needs: 25 - 50 %  % Meal Intake: 25 - 50 %    Nutrition Risk    Level of Risk/Frequency of Follow-up: low     Monitor and Evaluation    Food and Nutrient Intake: food and beverage intake  Anthropometric Measurements: weight     Nutrition Follow-Up    RD Follow-up?: Yes

## 2022-05-19 NOTE — CONSULTS
Patient is seen at bedside to begin ostomy teaching. Pt is somewhat somnolent but arousable and in pain just having received morphine. I attempted to involve the pt was his participation was very limited. I told the pt that while I can teach the family for now that he has to atleast empty himself and that he will receive more teaching in the clinic with the goal of independent care.  Pts daughter is at bedside and very attentive and appears competent to assist with care at home. Pts significant other is planning to come for additional teaching tomorrow. Midline dressing had to be removed to change leaking ostomy appliance. No stool noted just SS drainage. Midline well approximated with staples and free from erythema with scant SS drainage. A red rubber bridge noted to stoma site. Stoma is red and protruding. There is an area of extra tissue at about 4 O'clock making a seal more difficult. I will reach out to surgery. Appliance reapplied with a good seal and daughter took a video and asked appropriate questions. Pt was left supplies bedside and additional supplies to be shipped to home. I will continue teaching tomorrow and make an ostomy clinic apt. Pt states he has no insurance with needs to be addressed.

## 2022-05-20 LAB
ANION GAP SERPL CALC-SCNC: 5 MEQ/L
BASOPHILS # BLD AUTO: 0.05 X10(3)/MCL (ref 0–0.2)
BASOPHILS NFR BLD AUTO: 0.6 %
BUN SERPL-MCNC: 22.8 MG/DL (ref 8.4–25.7)
CALCIUM SERPL-MCNC: 8.8 MG/DL (ref 8.4–10.2)
CHLORIDE SERPL-SCNC: 99 MMOL/L (ref 98–107)
CO2 SERPL-SCNC: 27 MMOL/L (ref 22–29)
CREAT SERPL-MCNC: 1.23 MG/DL (ref 0.73–1.18)
CREAT/UREA NIT SERPL: 19
EOSINOPHIL # BLD AUTO: 0.27 X10(3)/MCL (ref 0–0.9)
EOSINOPHIL NFR BLD AUTO: 3.4 %
ERYTHROCYTE [DISTWIDTH] IN BLOOD BY AUTOMATED COUNT: 12.8 % (ref 11.5–17)
ESTROGEN SERPL-MCNC: NORMAL PG/ML
GLUCOSE SERPL-MCNC: 92 MG/DL (ref 74–100)
HCT VFR BLD AUTO: 38.1 % (ref 42–52)
HGB BLD-MCNC: 12.6 GM/DL (ref 14–18)
IMM GRANULOCYTES # BLD AUTO: 0.04 X10(3)/MCL (ref 0–0.02)
IMM GRANULOCYTES NFR BLD AUTO: 0.5 % (ref 0–0.43)
INSULIN SERPL-ACNC: NORMAL U[IU]/ML
LAB AP CLINICAL INFORMATION: NORMAL
LAB AP GROSS DESCRIPTION: NORMAL
LAB AP REPORT FOOTNOTES: NORMAL
LYMPHOCYTES # BLD AUTO: 1.84 X10(3)/MCL (ref 0.6–4.6)
LYMPHOCYTES NFR BLD AUTO: 23 %
MCH RBC QN AUTO: 31.2 PG (ref 27–31)
MCHC RBC AUTO-ENTMCNC: 33.1 MG/DL (ref 33–36)
MCV RBC AUTO: 94.3 FL (ref 80–94)
MONOCYTES # BLD AUTO: 0.57 X10(3)/MCL (ref 0.1–1.3)
MONOCYTES NFR BLD AUTO: 7.1 %
NEUTROPHILS # BLD AUTO: 5.2 X10(3)/MCL (ref 2.1–9.2)
NEUTROPHILS NFR BLD AUTO: 65.4 %
NRBC BLD AUTO-RTO: 0 %
PLATELET # BLD AUTO: 221 X10(3)/MCL (ref 130–400)
PMV BLD AUTO: 9.2 FL (ref 9.4–12.4)
POTASSIUM SERPL-SCNC: 3.9 MMOL/L (ref 3.5–5.1)
RBC # BLD AUTO: 4.04 X10(6)/MCL (ref 4.7–6.1)
SODIUM SERPL-SCNC: 131 MMOL/L (ref 136–145)
T3RU NFR SERPL: NORMAL %
WBC # SPEC AUTO: 8 X10(3)/MCL (ref 4.5–11.5)

## 2022-05-20 PROCEDURE — 85025 COMPLETE CBC W/AUTO DIFF WBC: CPT | Performed by: STUDENT IN AN ORGANIZED HEALTH CARE EDUCATION/TRAINING PROGRAM

## 2022-05-20 PROCEDURE — 63600175 PHARM REV CODE 636 W HCPCS: Performed by: STUDENT IN AN ORGANIZED HEALTH CARE EDUCATION/TRAINING PROGRAM

## 2022-05-20 PROCEDURE — 97530 THERAPEUTIC ACTIVITIES: CPT

## 2022-05-20 PROCEDURE — 97116 GAIT TRAINING THERAPY: CPT

## 2022-05-20 PROCEDURE — 21400001 HC TELEMETRY ROOM

## 2022-05-20 PROCEDURE — 80048 BASIC METABOLIC PNL TOTAL CA: CPT | Performed by: STUDENT IN AN ORGANIZED HEALTH CARE EDUCATION/TRAINING PROGRAM

## 2022-05-20 PROCEDURE — 25000003 PHARM REV CODE 250: Performed by: STUDENT IN AN ORGANIZED HEALTH CARE EDUCATION/TRAINING PROGRAM

## 2022-05-20 PROCEDURE — 11000001 HC ACUTE MED/SURG PRIVATE ROOM

## 2022-05-20 PROCEDURE — 36415 COLL VENOUS BLD VENIPUNCTURE: CPT | Performed by: STUDENT IN AN ORGANIZED HEALTH CARE EDUCATION/TRAINING PROGRAM

## 2022-05-20 PROCEDURE — 99900035 HC TECH TIME PER 15 MIN (STAT)

## 2022-05-20 PROCEDURE — 94761 N-INVAS EAR/PLS OXIMETRY MLT: CPT

## 2022-05-20 RX ORDER — TAMSULOSIN HYDROCHLORIDE 0.4 MG/1
0.4 CAPSULE ORAL DAILY
Status: DISCONTINUED | OUTPATIENT
Start: 2022-05-20 | End: 2022-05-27 | Stop reason: HOSPADM

## 2022-05-20 RX ORDER — METHOCARBAMOL 750 MG/1
750 TABLET, FILM COATED ORAL 4 TIMES DAILY
Status: DISCONTINUED | OUTPATIENT
Start: 2022-05-20 | End: 2022-05-27 | Stop reason: HOSPADM

## 2022-05-20 RX ORDER — GABAPENTIN 300 MG/1
300 CAPSULE ORAL 3 TIMES DAILY
Status: DISCONTINUED | OUTPATIENT
Start: 2022-05-20 | End: 2022-05-27 | Stop reason: HOSPADM

## 2022-05-20 RX ADMIN — METHOCARBAMOL 750 MG: 750 TABLET ORAL at 08:05

## 2022-05-20 RX ADMIN — SODIUM CHLORIDE, POTASSIUM CHLORIDE, SODIUM LACTATE AND CALCIUM CHLORIDE: 600; 310; 30; 20 INJECTION, SOLUTION INTRAVENOUS at 04:05

## 2022-05-20 RX ADMIN — HEPARIN SODIUM 5000 UNITS: 5000 INJECTION INTRAVENOUS; SUBCUTANEOUS at 09:05

## 2022-05-20 RX ADMIN — HEPARIN SODIUM 5000 UNITS: 5000 INJECTION INTRAVENOUS; SUBCUTANEOUS at 08:05

## 2022-05-20 RX ADMIN — MORPHINE SULFATE 4 MG: 2 INJECTION, SOLUTION INTRAMUSCULAR; INTRAVENOUS at 04:05

## 2022-05-20 RX ADMIN — MORPHINE SULFATE 4 MG: 2 INJECTION, SOLUTION INTRAMUSCULAR; INTRAVENOUS at 12:05

## 2022-05-20 RX ADMIN — ACETAMINOPHEN 325MG 650 MG: 325 TABLET ORAL at 06:05

## 2022-05-20 RX ADMIN — MORPHINE SULFATE 4 MG: 2 INJECTION, SOLUTION INTRAMUSCULAR; INTRAVENOUS at 09:05

## 2022-05-20 RX ADMIN — OXYCODONE HYDROCHLORIDE 10 MG: 5 TABLET ORAL at 08:05

## 2022-05-20 RX ADMIN — TRAMADOL HYDROCHLORIDE 50 MG: 50 TABLET, COATED ORAL at 11:05

## 2022-05-20 RX ADMIN — TAMSULOSIN HYDROCHLORIDE 0.4 MG: 0.4 CAPSULE ORAL at 08:05

## 2022-05-20 RX ADMIN — GABAPENTIN 300 MG: 300 CAPSULE ORAL at 08:05

## 2022-05-20 RX ADMIN — SODIUM CHLORIDE, POTASSIUM CHLORIDE, SODIUM LACTATE AND CALCIUM CHLORIDE: 600; 310; 30; 20 INJECTION, SOLUTION INTRAVENOUS at 08:05

## 2022-05-20 RX ADMIN — METHOCARBAMOL 750 MG: 750 TABLET ORAL at 01:05

## 2022-05-20 RX ADMIN — ACETAMINOPHEN 325MG 650 MG: 325 TABLET ORAL at 11:05

## 2022-05-20 RX ADMIN — TRAMADOL HYDROCHLORIDE 50 MG: 50 TABLET, COATED ORAL at 04:05

## 2022-05-20 RX ADMIN — ACETAMINOPHEN 325MG 650 MG: 325 TABLET ORAL at 12:05

## 2022-05-20 RX ADMIN — GABAPENTIN 300 MG: 300 CAPSULE ORAL at 03:05

## 2022-05-20 RX ADMIN — ACETAMINOPHEN 325MG 650 MG: 325 TABLET ORAL at 05:05

## 2022-05-20 RX ADMIN — METHOCARBAMOL 750 MG: 750 TABLET ORAL at 05:05

## 2022-05-20 RX ADMIN — HEPARIN SODIUM 5000 UNITS: 5000 INJECTION INTRAVENOUS; SUBCUTANEOUS at 03:05

## 2022-05-20 NOTE — PLAN OF CARE
Pt reported he has not been contacted by [facility] re M/D assessment. Referral submitted to [facility] for asst with application process. Pt denied additional needs, stating he is anxious to get back to work.

## 2022-05-20 NOTE — PROGRESS NOTES
[facility] - Purple Team  Daily Progress Note    Subjective:  Afebrile, VSS. Reports pain not well controlled. Colostomy with sweat in bag. Lutz in place.    Objective:    Vitals:  Vitals:    05/20/22 0508   BP: 134/77   Pulse: 68   Resp:    Temp: 98.1 °F (36.7 °C)        Physical Exam:  Gen: NAD  Neuro: awake, alert, answering questions appropriately  CV: RR  Resp: non-labored breathing, CONCHITA  Abd: soft, ND, appropriately tender. Stoma pink with sweat in pouch.  Ext: moves all 4 spontaneously and purposefully  Skin: warm, well perfused    Assessment/Plan:  57yo male with PMH of HTN who presented with rectal foreign body. Now s/p ex lap with removal through anterior rectotomy due to inability to remove through anus     - CLD, IVF  - Discontinue lutz  - Multimodal pain control  - Wound care consulted for ostomy teaching  - PT, OOB, IS  - [facility]      Skylar Hernandez MD   [facility] General Surgery, PGY1  05/20/2022 6:27 AM

## 2022-05-20 NOTE — MEDICAL/APP STUDENT
[facility] - Purple Team  Daily Progress Note    Subjective:  NAEON. Pt diffusely sore, improved transiently after pain PRNs. He attempted ambulation yesterday but stopped secondary to pain. Tolerating clear liquid diet. No n/v. Serrano in place, bag drained yesterday, with 650 mL urine.     Objective:    Vitals:  Vitals:    05/20/22 0508   BP: 134/77   Pulse: 68   Resp:    Temp: 98.1 °F (36.7 °C)        Intake/Output:  3880:550    Physical Exam:  Gen: NAD  Neuro: awake, alert, answering questions appropriately  CV: RRR  Resp: non-labored breathing, CONCHITA  Abd: soft, ND. Mild TTP to the epigastrium around an area that feels more tense than surrounding abdomen. Ostomy bag w small amount of serosanguinous fluid.   Ext: moves all 4 spontaneously and purposefully  Skin: warm, well perfused    Labs:  CBC, BMP: Needs to be collected      Assessment/Plan:  55 yo male with no pertinent medical history who presented with foreign body in rectum. Required ex-lap due to inability to remove manually. POD1 after surgical removal from rectum with ostomy placement.     - Continue with clear liquid diet  - IV fluids   - Pain control.  - PT/OT to work with pt for ambulation    Jesse Ashton   [facility] General Surgery, MS3  05/20/2022 5:52 AM

## 2022-05-20 NOTE — PT/OT/SLP PROGRESS
Physical Therapy Treatment    Patient Name:  Isaías Prasad   MRN:  [id number]    Recommendations:     Discharge Recommendations:  other (see comments) (home w/ responsible caregiver w/ HH w/ HHPT)   Discharge Equipment Recommendations: walker, rolling   Barriers to discharge: None    Assessment:     Isaías Prasad is a 56 y.o. male admitted with a medical diagnosis of s/p exp lap for removal of rectal foreign body.  There is no problem list on file for this patient.  He presents with the following impairments/functional limitations:  weakness, impaired functional mobilty, gait instability, impaired balance, pain, post op-precautions.    Rehab Prognosis: Good; patient would benefit from acute skilled PT services to address these deficits and reach maximum level of function.    Recent Surgery: Procedure(s) (LRB):  Exam under anesthesia, removal of rectal foreign body (N/A)  LAPAROTOMY, EXPLORATORY (N/A) 2 Days Post-Op     -continued OOB, transfers, up-to-chair, ambulation w/ RW w/ progression of activities as tolerated/appropriate (as ordered by M.D.)    Plan:     During this hospitalization, patient to be seen  (3-5 TIMES/WEEK) to address the identified rehab impairments via gait training, therapeutic activities, therapeutic exercises and progress toward the following goals:    · Plan of Care Expires:  06/18/22    Subjective     Chief Complaint: post-op pain  Patient/Family Comments/goals: return home to [facility]  Pain/Comfort:  · Pain Rating 1: 8/10  · Location - Side 1:  (surgical site)  · Location - Orientation 1:  (surgical site)  · Location 1:  (surgical site)  · Pain Addressed 1: Reposition, Distraction, Nurse notified  · Pain Rating Post-Intervention 1: 10/10 (after gait session and sitting in bedside chair)      Objective:     Communicated with pt's nurse Sterling prior to session.  Patient found supine in bed w/ HOB elevated, w/ HOB rails, w/ BILAT LE SCD's in place w/ pt's girlfriend in room with   upon PT entry to  room.     General Precautions: Standard, other (see comments) (standard, post-op)   Orthopedic Precautions:Full weight bearing   Braces: N/A  Respiratory Status: Room air     Functional Mobility:  · Bed Mobility:     · Rolling Left:  stand by assistance  · Scooting: supervision  · Supine to Sit: supervision  · Transfers:     · Sit to Stand:  minimum assistance with rolling walker  · Gait: min A w/ RW 130ft x1 w/ cues for proper sequencing/technique, decreased upright posture/aleksandr/bilat step length, widened base of support, slowed/guarded mvmts      AM-PAC 6 CLICK MOBILITY          Therapeutic Activities and Exercises:   -sitting balance activities - wt shifting, scooting, repositioning at edge of bed, donned 2nd back gown,  socks adjusted, slowed/guarded mvmts    Patient left up in chair with all lines intact, call button in reach, pt's nurse Hallie notified & into room at tx end, pt's girlfriend  present and tray table at bedside, lutz cath to gravity, ostomy bag in place..    GOALS:   Multidisciplinary Problems     Physical Therapy Goals        Problem: Physical Therapy    Goal Priority Disciplines Outcome Goal Variances Interventions   Physical Therapy Goal     PT, PT/OT Ongoing, Progressing     Description: Goals to be met by: discharge     ALL STG's ongoing - 2022    Patient will increase functional independence with mobility by performin. Supine to sit with Modified [place]    2. Sit to supine with Modified [place]    3. Rolling to Left and Right with Modified [place]    4. Sit to stand transfer with Modified [place]    5. Gait  x 260 feet with  Supervision using Rolling Walker    6. Ascend/descend 3 stair with right Handrails Minimal Assistance using No Assistive Device                     Time Tracking:     PT Received On: 22  PT Start Time: 1103     PT Stop Time: 1126  PT Total Time (min): 23 min     Billable Minutes: Gait Training 15 and Therapeutic Activity  8    Treatment Type: Treatment  PT/PTA: PT           05/20/2022

## 2022-05-20 NOTE — PROGRESS NOTES
Patient is seen at bedside with significant other to continue teaching. Daughter that was taught yesterday will now be out of town and unavailable to assist. A hands on teaching not done as appliance is still intact from yesterday. Demonstration still provided. Significant other states that she does not feel that comfortable with caring for this at home. I offered additional teaching on Monday to a sister that could help. Pt already has supplies set up and a follow up in ostomy clinic next week. I will follow up on Monday.

## 2022-05-21 LAB
ANION GAP SERPL CALC-SCNC: 9 MEQ/L
BASOPHILS # BLD AUTO: 0.04 X10(3)/MCL (ref 0–0.2)
BASOPHILS NFR BLD AUTO: 0.5 %
BUN SERPL-MCNC: 12.5 MG/DL (ref 8.4–25.7)
CALCIUM SERPL-MCNC: 8.5 MG/DL (ref 8.4–10.2)
CHLORIDE SERPL-SCNC: 101 MMOL/L (ref 98–107)
CO2 SERPL-SCNC: 26 MMOL/L (ref 22–29)
CREAT SERPL-MCNC: 0.86 MG/DL (ref 0.73–1.18)
CREAT/UREA NIT SERPL: 15
EOSINOPHIL # BLD AUTO: 0.28 X10(3)/MCL (ref 0–0.9)
EOSINOPHIL NFR BLD AUTO: 3.5 %
ERYTHROCYTE [DISTWIDTH] IN BLOOD BY AUTOMATED COUNT: 12.5 % (ref 11.5–17)
GLUCOSE SERPL-MCNC: 103 MG/DL (ref 74–100)
HCT VFR BLD AUTO: 36.8 % (ref 42–52)
HGB BLD-MCNC: 12.9 GM/DL (ref 14–18)
IMM GRANULOCYTES # BLD AUTO: 0.13 X10(3)/MCL (ref 0–0.02)
IMM GRANULOCYTES NFR BLD AUTO: 1.6 % (ref 0–0.43)
LYMPHOCYTES # BLD AUTO: 1.55 X10(3)/MCL (ref 0.6–4.6)
LYMPHOCYTES NFR BLD AUTO: 19.5 %
MAGNESIUM SERPL-MCNC: 1.7 MG/DL (ref 1.6–2.6)
MCH RBC QN AUTO: 31.7 PG (ref 27–31)
MCHC RBC AUTO-ENTMCNC: 35.1 MG/DL (ref 33–36)
MCV RBC AUTO: 90.4 FL (ref 80–94)
MONOCYTES # BLD AUTO: 0.71 X10(3)/MCL (ref 0.1–1.3)
MONOCYTES NFR BLD AUTO: 8.9 %
NEUTROPHILS # BLD AUTO: 5.3 X10(3)/MCL (ref 2.1–9.2)
NEUTROPHILS NFR BLD AUTO: 66 %
NRBC BLD AUTO-RTO: 0 %
PHOSPHATE SERPL-MCNC: 2.4 MG/DL (ref 2.3–4.7)
PLATELET # BLD AUTO: 236 X10(3)/MCL (ref 130–400)
PMV BLD AUTO: 9.1 FL (ref 9.4–12.4)
POTASSIUM SERPL-SCNC: 3.8 MMOL/L (ref 3.5–5.1)
RBC # BLD AUTO: 4.07 X10(6)/MCL (ref 4.7–6.1)
SODIUM SERPL-SCNC: 136 MMOL/L (ref 136–145)
WBC # SPEC AUTO: 8 X10(3)/MCL (ref 4.5–11.5)

## 2022-05-21 PROCEDURE — 85025 COMPLETE CBC W/AUTO DIFF WBC: CPT | Performed by: STUDENT IN AN ORGANIZED HEALTH CARE EDUCATION/TRAINING PROGRAM

## 2022-05-21 PROCEDURE — 63600175 PHARM REV CODE 636 W HCPCS: Performed by: STUDENT IN AN ORGANIZED HEALTH CARE EDUCATION/TRAINING PROGRAM

## 2022-05-21 PROCEDURE — 25000003 PHARM REV CODE 250: Performed by: STUDENT IN AN ORGANIZED HEALTH CARE EDUCATION/TRAINING PROGRAM

## 2022-05-21 PROCEDURE — 21400001 HC TELEMETRY ROOM

## 2022-05-21 PROCEDURE — 80048 BASIC METABOLIC PNL TOTAL CA: CPT | Performed by: STUDENT IN AN ORGANIZED HEALTH CARE EDUCATION/TRAINING PROGRAM

## 2022-05-21 PROCEDURE — 36415 COLL VENOUS BLD VENIPUNCTURE: CPT | Performed by: STUDENT IN AN ORGANIZED HEALTH CARE EDUCATION/TRAINING PROGRAM

## 2022-05-21 PROCEDURE — 83735 ASSAY OF MAGNESIUM: CPT | Performed by: STUDENT IN AN ORGANIZED HEALTH CARE EDUCATION/TRAINING PROGRAM

## 2022-05-21 PROCEDURE — 84100 ASSAY OF PHOSPHORUS: CPT | Performed by: STUDENT IN AN ORGANIZED HEALTH CARE EDUCATION/TRAINING PROGRAM

## 2022-05-21 PROCEDURE — 11000001 HC ACUTE MED/SURG PRIVATE ROOM

## 2022-05-21 PROCEDURE — 94761 N-INVAS EAR/PLS OXIMETRY MLT: CPT

## 2022-05-21 RX ORDER — POTASSIUM CHLORIDE 20 MEQ/1
20 TABLET, EXTENDED RELEASE ORAL ONCE
Status: COMPLETED | OUTPATIENT
Start: 2022-05-21 | End: 2022-05-21

## 2022-05-21 RX ADMIN — GABAPENTIN 300 MG: 300 CAPSULE ORAL at 08:05

## 2022-05-21 RX ADMIN — HEPARIN SODIUM 5000 UNITS: 5000 INJECTION INTRAVENOUS; SUBCUTANEOUS at 08:05

## 2022-05-21 RX ADMIN — METHOCARBAMOL 750 MG: 750 TABLET ORAL at 08:05

## 2022-05-21 RX ADMIN — TAMSULOSIN HYDROCHLORIDE 0.4 MG: 0.4 CAPSULE ORAL at 08:05

## 2022-05-21 RX ADMIN — POTASSIUM CHLORIDE 20 MEQ: 1500 TABLET, EXTENDED RELEASE ORAL at 08:05

## 2022-05-21 RX ADMIN — OXYCODONE HYDROCHLORIDE 10 MG: 5 TABLET ORAL at 03:05

## 2022-05-21 RX ADMIN — OXYCODONE HYDROCHLORIDE 10 MG: 5 TABLET ORAL at 05:05

## 2022-05-21 RX ADMIN — HEPARIN SODIUM 5000 UNITS: 5000 INJECTION INTRAVENOUS; SUBCUTANEOUS at 03:05

## 2022-05-21 RX ADMIN — SODIUM CHLORIDE, POTASSIUM CHLORIDE, SODIUM LACTATE AND CALCIUM CHLORIDE: 600; 310; 30; 20 INJECTION, SOLUTION INTRAVENOUS at 05:05

## 2022-05-21 RX ADMIN — GABAPENTIN 300 MG: 300 CAPSULE ORAL at 03:05

## 2022-05-21 RX ADMIN — MORPHINE SULFATE 4 MG: 2 INJECTION, SOLUTION INTRAMUSCULAR; INTRAVENOUS at 08:05

## 2022-05-21 RX ADMIN — ACETAMINOPHEN 325MG 650 MG: 325 TABLET ORAL at 12:05

## 2022-05-21 RX ADMIN — METHOCARBAMOL 750 MG: 750 TABLET ORAL at 05:05

## 2022-05-21 RX ADMIN — ACETAMINOPHEN 325MG 650 MG: 325 TABLET ORAL at 05:05

## 2022-05-21 RX ADMIN — TRAMADOL HYDROCHLORIDE 50 MG: 50 TABLET, COATED ORAL at 08:05

## 2022-05-21 RX ADMIN — METHOCARBAMOL 750 MG: 750 TABLET ORAL at 12:05

## 2022-05-21 RX ADMIN — MORPHINE SULFATE 4 MG: 2 INJECTION, SOLUTION INTRAMUSCULAR; INTRAVENOUS at 01:05

## 2022-05-21 NOTE — MEDICAL/APP STUDENT
[facility] - Purple Team  Daily Progress Note    Subjective:  NAEON. POD 3 s/p ex lap with foreign body removal through anterior rectotomy and subsequent diverting loop colostomy creation. Pt reports a dull-like pain at the area of his incision that's improved transiently after pain PRNs. Pt has been ambulating. Tolerating clear liquid diet. Has been urinating with 800mL of urine. Denies N/V, chest pain, SOB, dysuria.      Objective:    Vitals:  Vitals:    05/21/22 0547   BP:    Pulse:    Resp: 16   Temp:         Intake/Output:  1760:800    Physical Exam:  Gen: NAD  Neuro: awake, alert, answering questions appropriately  CV: RRR  Resp: non-labored breathing, CONCHITA  Abd: Soft, non-distended. Tenderness in area of ostomy bag. Ostomy bag w small amount of serosanguinous fluid.   Ext: moves all 4 spontaneously and purposefully  Skin: warm, well perfused    Labs:  CBC, BMP: Needs to be collected      Assessment/Plan:  55 yo male with no pertinent medical history who presented with foreign body in rectum. Required ex-lap due to inability to remove manually. POD1 after surgical removal from rectum with ostomy placement.     - CLD, IVF  - Multimodal pain control  - Wound care consulted for ostomy teaching  - PT, OOB, IS  - [facility]    Amy Yen, MS3 [facility]-NO

## 2022-05-21 NOTE — PROGRESS NOTES
[facility] - Purple Team  Daily Progress Note    Subjective:  NAEON. POD 3 s/p ex lap with foreign body removal through anterior rectotomy and subsequent diverting loop colostomy creation. Pt reports a dull-like pain at the area of his incision that's improved transiently after pain PRNs. Pt has been ambulating. Tolerating clear liquid diet. Has been urinating with 800mL of urine. Denies N/V, chest pain, SOB, dysuria.      Objective:    Vitals:  Vitals:    05/21/22 0807   BP: (!) 144/80   Pulse: (!) 59   Resp:    Temp: 97.7 °F (36.5 °C)        Intake/Output:  1760:800    Physical Exam:  Gen: NAD  Neuro: awake, alert, answering questions appropriately  CV: RRR  Resp: non-labored breathing, CONCHITA  Abd: Soft, non-distended. Tenderness in area of ostomy bag. Ostomy bag w small amount of serosanguinous fluid.   Ext: moves all 4 spontaneously and purposefully  Skin: warm, well perfused    Labs:  CBC, BMP: Needs to be collected      Assessment/Plan:  57 yo male with no pertinent medical history who presented with foreign body in rectum. Required ex-lap due to inability to remove manually. POD1 after surgical removal from rectum with ostomy placement.     - CLD, IVF  - Multimodal pain control  - Wound care consulted for ostomy teaching  - PT, OOB, IS  - [facility]    Amy Yen, MS3 [facility]-NO         Addendum  Patient seen and examined.  Agree with above, edited as needed.    NAEON, pain controlled. Urinating since removal of [place].  Just bowel sweat in bag.  Needs to ambulate more.  Continue current pain control.  Continue CLD pending further output. Oral KCl.    Gabriel Faustin MD  HO-3, [facility]  05/21/2022

## 2022-05-22 LAB
ALBUMIN SERPL-MCNC: 2.7 GM/DL (ref 3.5–5)
ALBUMIN/GLOB SERPL: 0.8 RATIO (ref 1.1–2)
ALP SERPL-CCNC: 276 UNIT/L (ref 40–150)
ALT SERPL-CCNC: 26 UNIT/L (ref 0–55)
AST SERPL-CCNC: 28 UNIT/L (ref 5–34)
BASOPHILS # BLD AUTO: 0.04 X10(3)/MCL (ref 0–0.2)
BASOPHILS NFR BLD AUTO: 0.5 %
BILIRUBIN DIRECT+TOT PNL SERPL-MCNC: 0.7 MG/DL
BUN SERPL-MCNC: 10.1 MG/DL (ref 8.4–25.7)
CALCIUM SERPL-MCNC: 8.9 MG/DL (ref 8.4–10.2)
CHLORIDE SERPL-SCNC: 102 MMOL/L (ref 98–107)
CO2 SERPL-SCNC: 24 MMOL/L (ref 22–29)
CREAT SERPL-MCNC: 0.89 MG/DL (ref 0.73–1.18)
EOSINOPHIL # BLD AUTO: 0.27 X10(3)/MCL (ref 0–0.9)
EOSINOPHIL NFR BLD AUTO: 3.2 %
ERYTHROCYTE [DISTWIDTH] IN BLOOD BY AUTOMATED COUNT: 12.6 % (ref 11.5–17)
GLOBULIN SER-MCNC: 3.6 GM/DL (ref 2.4–3.5)
GLUCOSE SERPL-MCNC: 107 MG/DL (ref 74–100)
HCT VFR BLD AUTO: 40.1 % (ref 42–52)
HGB BLD-MCNC: 13.2 GM/DL (ref 14–18)
IMM GRANULOCYTES # BLD AUTO: 0.06 X10(3)/MCL (ref 0–0.02)
IMM GRANULOCYTES NFR BLD AUTO: 0.7 % (ref 0–0.43)
LYMPHOCYTES # BLD AUTO: 1.65 X10(3)/MCL (ref 0.6–4.6)
LYMPHOCYTES NFR BLD AUTO: 19.6 %
MAGNESIUM SERPL-MCNC: 1.7 MG/DL (ref 1.6–2.6)
MCH RBC QN AUTO: 30.8 PG (ref 27–31)
MCHC RBC AUTO-ENTMCNC: 32.9 MG/DL (ref 33–36)
MCV RBC AUTO: 93.7 FL (ref 80–94)
MONOCYTES # BLD AUTO: 0.73 X10(3)/MCL (ref 0.1–1.3)
MONOCYTES NFR BLD AUTO: 8.7 %
NEUTROPHILS # BLD AUTO: 5.7 X10(3)/MCL (ref 2.1–9.2)
NEUTROPHILS NFR BLD AUTO: 67.3 %
NRBC BLD AUTO-RTO: 0 %
PHOSPHATE SERPL-MCNC: 2.9 MG/DL (ref 2.3–4.7)
PLATELET # BLD AUTO: 219 X10(3)/MCL (ref 130–400)
PMV BLD AUTO: 10.4 FL (ref 9.4–12.4)
POTASSIUM SERPL-SCNC: 3.8 MMOL/L (ref 3.5–5.1)
PROT SERPL-MCNC: 6.3 GM/DL (ref 6.4–8.3)
RBC # BLD AUTO: 4.28 X10(6)/MCL (ref 4.7–6.1)
SODIUM SERPL-SCNC: 136 MMOL/L (ref 136–145)
WBC # SPEC AUTO: 8.4 X10(3)/MCL (ref 4.5–11.5)

## 2022-05-22 PROCEDURE — 36415 COLL VENOUS BLD VENIPUNCTURE: CPT | Performed by: STUDENT IN AN ORGANIZED HEALTH CARE EDUCATION/TRAINING PROGRAM

## 2022-05-22 PROCEDURE — 21400001 HC TELEMETRY ROOM

## 2022-05-22 PROCEDURE — 25000003 PHARM REV CODE 250: Performed by: STUDENT IN AN ORGANIZED HEALTH CARE EDUCATION/TRAINING PROGRAM

## 2022-05-22 PROCEDURE — 83735 ASSAY OF MAGNESIUM: CPT | Performed by: STUDENT IN AN ORGANIZED HEALTH CARE EDUCATION/TRAINING PROGRAM

## 2022-05-22 PROCEDURE — 11000001 HC ACUTE MED/SURG PRIVATE ROOM

## 2022-05-22 PROCEDURE — 63600175 PHARM REV CODE 636 W HCPCS: Performed by: STUDENT IN AN ORGANIZED HEALTH CARE EDUCATION/TRAINING PROGRAM

## 2022-05-22 PROCEDURE — 80053 COMPREHEN METABOLIC PANEL: CPT | Performed by: STUDENT IN AN ORGANIZED HEALTH CARE EDUCATION/TRAINING PROGRAM

## 2022-05-22 PROCEDURE — 25000003 PHARM REV CODE 250

## 2022-05-22 PROCEDURE — 84100 ASSAY OF PHOSPHORUS: CPT | Performed by: STUDENT IN AN ORGANIZED HEALTH CARE EDUCATION/TRAINING PROGRAM

## 2022-05-22 PROCEDURE — 85025 COMPLETE CBC W/AUTO DIFF WBC: CPT | Performed by: STUDENT IN AN ORGANIZED HEALTH CARE EDUCATION/TRAINING PROGRAM

## 2022-05-22 PROCEDURE — 94761 N-INVAS EAR/PLS OXIMETRY MLT: CPT

## 2022-05-22 RX ORDER — ACETAMINOPHEN 500 MG
1000 TABLET ORAL EVERY 8 HOURS
Status: DISCONTINUED | OUTPATIENT
Start: 2022-05-22 | End: 2022-05-27 | Stop reason: HOSPADM

## 2022-05-22 RX ADMIN — GABAPENTIN 300 MG: 300 CAPSULE ORAL at 09:05

## 2022-05-22 RX ADMIN — METHOCARBAMOL 750 MG: 750 TABLET ORAL at 04:05

## 2022-05-22 RX ADMIN — ACETAMINOPHEN 325MG 650 MG: 325 TABLET ORAL at 06:05

## 2022-05-22 RX ADMIN — METHOCARBAMOL 750 MG: 750 TABLET ORAL at 01:05

## 2022-05-22 RX ADMIN — GABAPENTIN 300 MG: 300 CAPSULE ORAL at 03:05

## 2022-05-22 RX ADMIN — MORPHINE SULFATE 4 MG: 2 INJECTION, SOLUTION INTRAMUSCULAR; INTRAVENOUS at 01:05

## 2022-05-22 RX ADMIN — METHOCARBAMOL 750 MG: 750 TABLET ORAL at 09:05

## 2022-05-22 RX ADMIN — HEPARIN SODIUM 5000 UNITS: 5000 INJECTION INTRAVENOUS; SUBCUTANEOUS at 03:05

## 2022-05-22 RX ADMIN — OXYCODONE HYDROCHLORIDE 10 MG: 5 TABLET ORAL at 08:05

## 2022-05-22 RX ADMIN — ACETAMINOPHEN 1000 MG: 500 TABLET ORAL at 09:05

## 2022-05-22 RX ADMIN — HEPARIN SODIUM 5000 UNITS: 5000 INJECTION INTRAVENOUS; SUBCUTANEOUS at 09:05

## 2022-05-22 RX ADMIN — ACETAMINOPHEN 325MG 650 MG: 325 TABLET ORAL at 12:05

## 2022-05-22 RX ADMIN — SODIUM CHLORIDE, POTASSIUM CHLORIDE, SODIUM LACTATE AND CALCIUM CHLORIDE: 600; 310; 30; 20 INJECTION, SOLUTION INTRAVENOUS at 08:05

## 2022-05-22 RX ADMIN — SODIUM CHLORIDE, POTASSIUM CHLORIDE, SODIUM LACTATE AND CALCIUM CHLORIDE: 600; 310; 30; 20 INJECTION, SOLUTION INTRAVENOUS at 11:05

## 2022-05-22 RX ADMIN — METHOCARBAMOL 750 MG: 750 TABLET ORAL at 08:05

## 2022-05-22 RX ADMIN — ACETAMINOPHEN 1000 MG: 500 TABLET ORAL at 01:05

## 2022-05-22 RX ADMIN — TAMSULOSIN HYDROCHLORIDE 0.4 MG: 0.4 CAPSULE ORAL at 08:05

## 2022-05-22 RX ADMIN — SODIUM CHLORIDE, POTASSIUM CHLORIDE, SODIUM LACTATE AND CALCIUM CHLORIDE: 600; 310; 30; 20 INJECTION, SOLUTION INTRAVENOUS at 12:05

## 2022-05-22 RX ADMIN — HEPARIN SODIUM 5000 UNITS: 5000 INJECTION INTRAVENOUS; SUBCUTANEOUS at 08:05

## 2022-05-22 RX ADMIN — GABAPENTIN 300 MG: 300 CAPSULE ORAL at 08:05

## 2022-05-22 RX ADMIN — OXYCODONE HYDROCHLORIDE 10 MG: 5 TABLET ORAL at 12:05

## 2022-05-22 RX ADMIN — OXYCODONE HYDROCHLORIDE 10 MG: 5 TABLET ORAL at 07:05

## 2022-05-22 NOTE — MEDICAL/APP STUDENT
[facility] - Purple Team  Daily Progress Note    Subjective:  NAEON. POD 4 s/p ex lap with foreign body removal through anterior rectotomy and subsequent diverting loop colostomy creation. Pt reports pain well controlled with medication. Pt has been ambulating. Tolerating clear liquid diet. Has been urinating with 800mL of urine. Denies N/V, chest pain, SOB, dysuria.      Objective:    Vitals:  Vitals:    05/22/22 0028   BP:    Pulse:    Resp: 20   Temp:         Intake/Output:  480:1750    Physical Exam:  Gen: NAD  Neuro: awake, alert, answering questions appropriately  CV: RRR  Resp: non-labored breathing, CONCHITA  Abd: Soft, non-distended. Tenderness in area of ostomy bag. Ostomy bag w small amount of serosanguinous fluid.   Ext: moves all 4 spontaneously and purposefully  Skin: warm, well perfused    Labs:  Hgb 13.2, Hct 40.1   Na 136, K 3.8, Cl 102, CO2 24, glucose 107, BUN 10.1, Cr 0.89, total protein 6.3, albumin 2.7, globulin 3.6, alk P 276  Mg 1.70, P 2.9    Assessment/Plan:  57 yo male with no pertinent medical history who presented with foreign body in rectum. Required ex-lap due to inability to remove manually. POD4 after surgical removal from rectum with ostomy placement.     - CLD, IVF  - Continue CLD pending further output  - Continue multimodal pain control  - Wound care consulted for ostomy teaching  - PT, OOB, IS  - [facility]    Amy Yen, MS3 [facility]-NO

## 2022-05-22 NOTE — PROGRESS NOTES
[facility] - Purple Team  Daily Progress Note    Subjective:  NAEON. POD 4 s/p ex lap with foreign body removal through anterior rectotomy and subsequent diverting loop colostomy creation. Pt reports pain well controlled with medication. Pt has been ambulating. Tolerating clear liquid diet. Has been urinating with 800mL of urine. Denies N/V, chest pain, SOB, dysuria.      Objective:    Vitals:  Vitals:    05/22/22 0500   BP: (!) 164/89   Pulse: 80   Resp: 20   Temp: 98.2 °F (36.8 °C)        Intake/Output:  480:1750    Physical Exam:  Gen: NAD  Neuro: awake, alert, answering questions appropriately  CV: RRR  Resp: non-labored breathing, CONCHITA  Abd: Soft, non-distended. Tenderness in area of ostomy bag. Ostomy bag w small amount of serosanguinous fluid.   Ext: moves all 4 spontaneously and purposefully  Skin: warm, well perfused    Labs:  Hgb 13.2, Hct 40.1   Na 136, K 3.8, Cl 102, CO2 24, glucose 107, BUN 10.1, Cr 0.89, total protein 6.3, albumin 2.7, globulin 3.6, alk P 276  Mg 1.70, P 2.9    Assessment/Plan:  55 yo male with no pertinent medical history who presented with foreign body in rectum. Required ex-lap due to inability to remove manually. POD4 after surgical removal from rectum with ostomy placement.     - CLD, IVF  - Continue CLD pending further output  - Continue multimodal pain control  - Wound care consulted for ostomy teaching  - PT, OOB, IS  - [facility]    Amy Yen, MS3 [facility]-NO         Addendum  Patient seen and examined.  Agree with above, edited as needed.    Doing OK, just bowel sweat out from ostomy.  Digitized yesterday and today, patent.  Anastasiya CLD with no nausea. Will get flat and erect. If no evidence of ileus, will start regular diet and bowel reg.  If still no output by 5/23, will remove ostomy bar.    Gabriel Faustin MD  HO-3, [facility]  05/22/2022

## 2022-05-23 LAB
ALBUMIN SERPL-MCNC: 2.8 GM/DL (ref 3.5–5)
ALBUMIN/GLOB SERPL: 0.8 RATIO (ref 1.1–2)
ALP SERPL-CCNC: 343 UNIT/L (ref 40–150)
ALT SERPL-CCNC: 38 UNIT/L (ref 0–55)
AST SERPL-CCNC: 33 UNIT/L (ref 5–34)
BASOPHILS # BLD AUTO: 0.05 X10(3)/MCL (ref 0–0.2)
BASOPHILS NFR BLD AUTO: 0.5 %
BILIRUBIN DIRECT+TOT PNL SERPL-MCNC: 0.7 MG/DL
BUN SERPL-MCNC: 9.3 MG/DL (ref 8.4–25.7)
CALCIUM SERPL-MCNC: 9.4 MG/DL (ref 8.4–10.2)
CHLORIDE SERPL-SCNC: 102 MMOL/L (ref 98–107)
CO2 SERPL-SCNC: 30 MMOL/L (ref 22–29)
CREAT SERPL-MCNC: 0.96 MG/DL (ref 0.73–1.18)
EOSINOPHIL # BLD AUTO: 0.33 X10(3)/MCL (ref 0–0.9)
EOSINOPHIL NFR BLD AUTO: 3.5 %
ERYTHROCYTE [DISTWIDTH] IN BLOOD BY AUTOMATED COUNT: 12.7 % (ref 11.5–17)
GLOBULIN SER-MCNC: 3.6 GM/DL (ref 2.4–3.5)
GLUCOSE SERPL-MCNC: 100 MG/DL (ref 74–100)
HCT VFR BLD AUTO: 39.8 % (ref 42–52)
HGB BLD-MCNC: 13.4 GM/DL (ref 14–18)
IMM GRANULOCYTES # BLD AUTO: 0.04 X10(3)/MCL (ref 0–0.02)
IMM GRANULOCYTES NFR BLD AUTO: 0.4 % (ref 0–0.43)
LYMPHOCYTES # BLD AUTO: 1.5 X10(3)/MCL (ref 0.6–4.6)
LYMPHOCYTES NFR BLD AUTO: 16 %
MAGNESIUM SERPL-MCNC: 1.8 MG/DL (ref 1.6–2.6)
MCH RBC QN AUTO: 31 PG (ref 27–31)
MCHC RBC AUTO-ENTMCNC: 33.7 MG/DL (ref 33–36)
MCV RBC AUTO: 92.1 FL (ref 80–94)
MONOCYTES # BLD AUTO: 0.78 X10(3)/MCL (ref 0.1–1.3)
MONOCYTES NFR BLD AUTO: 8.3 %
NEUTROPHILS # BLD AUTO: 6.7 X10(3)/MCL (ref 2.1–9.2)
NEUTROPHILS NFR BLD AUTO: 71.3 %
NRBC BLD AUTO-RTO: 0 %
PHOSPHATE SERPL-MCNC: 3 MG/DL (ref 2.3–4.7)
PLATELET # BLD AUTO: 292 X10(3)/MCL (ref 130–400)
PMV BLD AUTO: 9.6 FL (ref 9.4–12.4)
POTASSIUM SERPL-SCNC: 3.8 MMOL/L (ref 3.5–5.1)
PROT SERPL-MCNC: 6.4 GM/DL (ref 6.4–8.3)
RBC # BLD AUTO: 4.32 X10(6)/MCL (ref 4.7–6.1)
SODIUM SERPL-SCNC: 140 MMOL/L (ref 136–145)
WBC # SPEC AUTO: 9.4 X10(3)/MCL (ref 4.5–11.5)

## 2022-05-23 PROCEDURE — 25000003 PHARM REV CODE 250: Performed by: STUDENT IN AN ORGANIZED HEALTH CARE EDUCATION/TRAINING PROGRAM

## 2022-05-23 PROCEDURE — 84100 ASSAY OF PHOSPHORUS: CPT | Performed by: STUDENT IN AN ORGANIZED HEALTH CARE EDUCATION/TRAINING PROGRAM

## 2022-05-23 PROCEDURE — 83735 ASSAY OF MAGNESIUM: CPT | Performed by: STUDENT IN AN ORGANIZED HEALTH CARE EDUCATION/TRAINING PROGRAM

## 2022-05-23 PROCEDURE — 25000003 PHARM REV CODE 250

## 2022-05-23 PROCEDURE — 11000001 HC ACUTE MED/SURG PRIVATE ROOM

## 2022-05-23 PROCEDURE — 63600175 PHARM REV CODE 636 W HCPCS: Performed by: STUDENT IN AN ORGANIZED HEALTH CARE EDUCATION/TRAINING PROGRAM

## 2022-05-23 PROCEDURE — 80053 COMPREHEN METABOLIC PANEL: CPT | Performed by: STUDENT IN AN ORGANIZED HEALTH CARE EDUCATION/TRAINING PROGRAM

## 2022-05-23 PROCEDURE — 36415 COLL VENOUS BLD VENIPUNCTURE: CPT | Performed by: STUDENT IN AN ORGANIZED HEALTH CARE EDUCATION/TRAINING PROGRAM

## 2022-05-23 PROCEDURE — 21400001 HC TELEMETRY ROOM

## 2022-05-23 PROCEDURE — 94761 N-INVAS EAR/PLS OXIMETRY MLT: CPT

## 2022-05-23 PROCEDURE — 85025 COMPLETE CBC W/AUTO DIFF WBC: CPT | Performed by: STUDENT IN AN ORGANIZED HEALTH CARE EDUCATION/TRAINING PROGRAM

## 2022-05-23 PROCEDURE — 97116 GAIT TRAINING THERAPY: CPT

## 2022-05-23 RX ORDER — OXYCODONE HYDROCHLORIDE 5 MG/1
5 TABLET ORAL EVERY 4 HOURS PRN
Status: DISCONTINUED | OUTPATIENT
Start: 2022-05-23 | End: 2022-05-27 | Stop reason: HOSPADM

## 2022-05-23 RX ADMIN — MORPHINE SULFATE 4 MG: 2 INJECTION, SOLUTION INTRAMUSCULAR; INTRAVENOUS at 09:05

## 2022-05-23 RX ADMIN — OXYCODONE HYDROCHLORIDE 10 MG: 5 TABLET ORAL at 04:05

## 2022-05-23 RX ADMIN — OXYCODONE HYDROCHLORIDE 10 MG: 5 TABLET ORAL at 06:05

## 2022-05-23 RX ADMIN — SODIUM CHLORIDE, POTASSIUM CHLORIDE, SODIUM LACTATE AND CALCIUM CHLORIDE: 600; 310; 30; 20 INJECTION, SOLUTION INTRAVENOUS at 09:05

## 2022-05-23 RX ADMIN — SODIUM CHLORIDE, POTASSIUM CHLORIDE, SODIUM LACTATE AND CALCIUM CHLORIDE: 600; 310; 30; 20 INJECTION, SOLUTION INTRAVENOUS at 08:05

## 2022-05-23 RX ADMIN — ACETAMINOPHEN 1000 MG: 500 TABLET ORAL at 09:05

## 2022-05-23 RX ADMIN — PIPERACILLIN SODIUM AND TAZOBACTAM SODIUM 4.5 G: 4; .5 INJECTION, POWDER, LYOPHILIZED, FOR SOLUTION INTRAVENOUS at 10:05

## 2022-05-23 RX ADMIN — HEPARIN SODIUM 5000 UNITS: 5000 INJECTION INTRAVENOUS; SUBCUTANEOUS at 09:05

## 2022-05-23 RX ADMIN — MORPHINE SULFATE 4 MG: 2 INJECTION, SOLUTION INTRAMUSCULAR; INTRAVENOUS at 08:05

## 2022-05-23 RX ADMIN — ACETAMINOPHEN 1000 MG: 500 TABLET ORAL at 02:05

## 2022-05-23 RX ADMIN — GABAPENTIN 300 MG: 300 CAPSULE ORAL at 02:05

## 2022-05-23 RX ADMIN — GABAPENTIN 300 MG: 300 CAPSULE ORAL at 08:05

## 2022-05-23 RX ADMIN — TAMSULOSIN HYDROCHLORIDE 0.4 MG: 0.4 CAPSULE ORAL at 08:05

## 2022-05-23 RX ADMIN — METHOCARBAMOL 750 MG: 750 TABLET ORAL at 12:05

## 2022-05-23 RX ADMIN — TRAMADOL HYDROCHLORIDE 50 MG: 50 TABLET, COATED ORAL at 08:05

## 2022-05-23 RX ADMIN — METHOCARBAMOL 750 MG: 750 TABLET ORAL at 08:05

## 2022-05-23 RX ADMIN — METHOCARBAMOL 750 MG: 750 TABLET ORAL at 04:05

## 2022-05-23 RX ADMIN — MORPHINE SULFATE 4 MG: 2 INJECTION, SOLUTION INTRAMUSCULAR; INTRAVENOUS at 02:05

## 2022-05-23 RX ADMIN — HEPARIN SODIUM 5000 UNITS: 5000 INJECTION INTRAVENOUS; SUBCUTANEOUS at 08:05

## 2022-05-23 RX ADMIN — HEPARIN SODIUM 5000 UNITS: 5000 INJECTION INTRAVENOUS; SUBCUTANEOUS at 04:05

## 2022-05-23 NOTE — PROGRESS NOTES
[facility] - [address] [place], [place] [address]etry  Adult Nutrition  Progress Note    SUMMARY       Recommendations    Recommendation/Intervention: 1. continue regular diet (started today); 2. continue boost breeze tid; 3. If pt unable to tolerate solid food or needs to change back to NPO/ cl liq--then suggest use PPN: Clinimix 4.25/5 @ 100ml/hr with lipids 20% 250 ml daily to provide 1316 calories, 102 gm protein. 5. biweekly wt. will monitor nutrition status  Goals: meet greater than 75% nutrition needs  Nutrition Goal Status: new  Communication of RD Recs: reviewed with RN    Assessment and Plan    Pt diet progressed to regular today ; had been NPO/ cl liq x 6 days; pt was tolerating cl liq/boost breeze; appetite increasing; no wt change reported; pt is having some discomfort/redness around ostomy site; WCN is treating area. Suggest continue curretn oral supplement; PPN recs provided in event pt does not tolerate solid food.   Nutrition Problem  Inadequate Oral Intake    Related to (etiology):   Ostomy/surgery     Signs and Symptoms (as evidenced by):   Po intake 75% or less needs     Interventions(treatment strategy):  General / Healthful Diet and Commercial beverage    Nutrition Diagnosis Status:   Improving      Malnutrition Assessment  Malnutrition Type: other (see comments) (pt does not meet acute malnutrition criteria--for wt loss; fat/muscle wasting, edema.)          Energy Intake (Malnutrition): less than 75% for greater than 7 days                         Reason for Assessment    Reason For Assessment: RD follow-up, NPO/clear liquids x 5 days  Diagnosis: other (see comments) (rectal foreign body; (5-18) S/P Exp Lap--diverting loop colostomy)  Relevant Medical History: none  General Information Comments: Pt reported tolerating cl liq diet; diet to advance to regular today; pt has been NPO/Cl liq x 6 days; pt having some abdominal pain near colostomy site; WCN was consulted to evaulate erythema near ostomy site; pt  "reported he is hungry/fair/good appetite. no new wt    Nutrition Risk Screen    Nutrition Risk Screen: other (see comments) (ostomy wound/erythema)    Nutrition/Diet History    Patient Reported Diet/Restrictions/Preferences: general  Typical Food/Fluid Intake: meals/ snacks  Spiritual, Cultural Beliefs, Hindu Practices, Values that Affect Care: no  Food Allergies: NKFA  Factors Affecting Nutritional Intake: altered gastrointestinal function, other (see comments) (NPO/c liq status x 6 days; diet progressed today)    Anthropometrics    Temp: (P) 97.7 °F (36.5 °C)  Height Method: Stated  Height: 6' 2" (188 cm)  Height (inches): 74 in  Weight Method: Standard Scale  Weight: 83.9 kg (185 lb)  Weight (lb): 185 lb  Ideal Body Weight (IBW), Male: 190 lb  % Ideal Body Weight, Male (lb): 97.37 %  BMI (Calculated): 23.7  BMI Grade: 18.5-24.9 - normal  Usual Body Weight (UBW), k.9 kg  % Usual Body Weight: 100.23       Lab/Procedures/Meds    Pertinent Labs Reviewed: reviewed  Pertinent Labs Comments: H/H 13.4/39.8 Gluc 100 Bun 9.3 Cr 0.9 Alk PHos 343(H) Alb 2.8(L)  Pertinent Medications Reviewed: reviewed  Pertinent Medications Comments: acetaminophen, zosyn, IVF LR @ 125ml/hr    Estimated/Assessed Needs    Weight Used For Calorie Calculations: 83.9 kg (184 lb 15.5 oz)  Energy Calorie Requirements (kcal): 2097 kcal/d; 25 kcal/kg  Energy Need Method: Kcal/kg  Protein Requirements: 100 gm protein/d; 1.2 gm/kg  Weight Used For Protein Calculations: 83.9 kg (184 lb 15.5 oz)     Estimated Fluid Requirement Method: RDA Method  RDA Method (mL):          Nutrition Prescription Ordered    Current Diet Order: regular diet (initiated today) with boost breeze tid    Evaluation of Received Nutrient/Fluid Intake    Energy Calories Required: other (see comments) (diet progressed today; po intake increasing 50-75%)  Protein Required: other (see comments) (50-75% needs)  Fluid Required: meeting needs  Comments: Pt tolerated cl liq " diert; diet progressed to regular today; monitor oral intake; drinking boost breeze; pt having  some redness/erythema around ostomy site.  Tolerance: tolerating  % Intake of Estimated Energy Needs: 50 - 75 %  % Meal Intake: 50 - 75 %    Nutrition Risk    Level of Risk/Frequency of Follow-up: moderate     Monitor and Evaluation    Food and Nutrient Intake: energy intake  Food and Nutrient Adminstration: diet order  Anthropometric Measurements: weight     Nutrition Follow-Up    RD Follow-up?: Yes

## 2022-05-23 NOTE — PT/OT/SLP PROGRESS
"Physical Therapy Treatment    Patient Name:  Isaías Prasad   MRN:  [id number]    Recommendations:     Discharge Recommendations:  other (see comments) ([facility]AC vs. home w/ responsible caregiver w/ HH w/ HHPT)   Discharge Equipment Recommendations: walker, rolling   Barriers to discharge: None    Assessment:     Isaías Prasad is a 56 y.o. male admitted with a medical diagnosis of  rectal foreign body w/ surgical intervention.  There is no problem list on file for this patient. He presents with the following impairments/functional limitations:  weakness, impaired functional mobilty, gait instability, impaired balance, pain.    Rehab Prognosis: Good; patient would benefit from acute skilled PT services to address these deficits and reach maximum level of function.      -continued supervised/assisted OOB, transfers, ambulation w/ RW w/ progression as tolerated/appropriate (as ordered by M.D.)      Recent Surgery: Procedure(s) (LRB):  Exam under anesthesia, removal of rectal foreign body (N/A)  LAPAROTOMY, EXPLORATORY (N/A) 5 Days Post-Op    Plan:     During this hospitalization, patient to be seen  (3-5 TIMES/WEEK) to address the identified rehab impairments via gait training, therapeutic activities, therapeutic exercises and progress toward the following goals:    · Plan of Care Expires:  06/18/22    Subjective     Chief Complaint: pain complaints and per pt - "ready to go home"  Patient/Family Comments/goals: return home to [facility]  Pain/Comfort:  · Pain Rating 1: 7/10  · Location - Side 1:  (surgical site)  · Location - Orientation 1:  (surgical site)  · Location 1:  (surgical site)  · Pain Addressed 1: Reposition, Distraction      Objective:     Communicated with pt's nurse Sterling prior to session.  Patient found supine in bed w/ HOB elevated with Other (comments), telemetry (ostomy) upon PT entry to room.     General Precautions: Standard, other (see comments) (standard, post-op)   Orthopedic Precautions:Full weight bearing "   Braces:    Respiratory Status: Room air     Functional Mobility:  · Bed Mobility:     · Rolling Left:  modified independence  · Rolling Right: modified independence  · Scooting: modified independence  · Supine to Sit: modified independence  · Sit to Supine: modified independence  · Transfers:     · Sit to Stand:  contact guard assistance with rolling walker  · Gait: CGA w/ RW w/ slowed/guarded mvmts - 130ft x2 w/ sitting rest x5 minutes b/w sessions, decreased upright posture/aleksandr/step length, narrowed base of support, no loss of balance, no mis-steps      AM-PAC 6 CLICK MOBILITY          Therapeutic Activities and Exercises:   patient sitting edge after 2nd gait session - soiled gown changed    Patient left w/ HOB rails up, tray table at beside with all lines intact, call button in reach.    GOALS:   Multidisciplinary Problems     Physical Therapy Goals        Problem: Physical Therapy    Goal Priority Disciplines Outcome Goal Variances Interventions   Physical Therapy Goal     PT, PT/OT Ongoing, Progressing     Description: Goals to be met by: discharge       Patient will increase functional independence with mobility by performin. Supine to sit with Modified [place] - met 2022  2. Sit to supine with Modified [place]  - met 2022  3. Rolling to Left and Right with Modified [place]  - met 2022  4. Sit to stand transfer with Modified [place] - ongoing  5. Gait  x 260 feet with  Supervision using Rolling Walker - ongoing  6. Ascend/descend 3 stair with right Handrails Minimal Assistance using No Assistive Device - ongoing                     Time Tracking:     PT Received On: 22  PT Start Time: 1115     PT Stop Time: 1138  PT Total Time (min): 23 min     Billable Minutes: Gait Training 23    Treatment Type: Treatment  PT/PTA: PT           2022

## 2022-05-23 NOTE — PLAN OF CARE
Spoke to Financial Assistance RepSkylar ([phone]) who assessed pt for M/D eligibility on 5/19/2022 and reported pt is overincome/ineligible at this time.

## 2022-05-23 NOTE — PROGRESS NOTES
Patient is seen at bedside this morning after nursing called with concerns about new erythema and increasing pain to midline incision and ostomy site. Pt is seen and noted to have significant erythema that was not present on Friday. Erythema and induration noted to midline and ostomy zac wound extending to RLQ.. Pt endorses pain requiring IV pain meds that had been decreasing in need. Murky SS drainage noted from midline and red rubber catheter sites. Peristomal induration noted as well. Ostomy care had to pause until pain meds given due to reports of severe pain. Surgery notified and resident came assess bedside. They will discuss findings with team. Area cleaned and new appliance applied. Close proximity to midline incision and red rubber catheter make it difficult to get a seal. This is concerning as pt does not have coverage for HH at this time and significant other that was taught on Friday was not confident in her ability to manage. Pt is in too much pain to participate in teaching today. I will await surgery reccs and assist as needed.

## 2022-05-23 NOTE — PROGRESS NOTES
[facility] - Purple Team  Daily Progress Note     Subjective:  NAEON. Complains of diffuse pain but overall well controlled with PRNs. Continues to tolerate clear liquid diet. No n/v. Ambulating without difficulty or significant pain. No urinary complaints; 1700 mL urine output ON.      Objective:     Vitals:      Vitals:     05/23/22 0351   BP: (!) 165/91   Pulse: 80   Resp:     Temp: 98.3 °F (36.8 °C)         Intake/Output:  2102:3400     Physical Exam:  Gen: NAD  Neuro: awake, alert, answering questions appropriately  CV: RRR  Resp: non-labored breathing, CONCHITA  Abd: soft, ND, NT. Ostomy bag in place with brown-tinged output. Surrounding erythema around ostomy site; minimally tender.    Ext: moves all 4 spontaneously and purposefully  Skin: warm, well perfused     Labs:  CBC, CMP: Needs to be collected     Assessment/Plan:  55 yo male with no pertinent medical history who presented with foreign body in rectum. Required ex-lap due to inability to remove manually. POD4 after surgical removal from rectum with ostomy placement.     - Transition to solid diet  - Continue multimodal pain control  - Wound care consulted for ostomy teaching  - PT, OOB, IS  - [facility]     Jesse Ashton   [facility] General Surgery, MS3  05/23/2022 5:42 AM           Addendum  Patient seen and examined this morning.  Agree with above.  - tolerating CLD  - Ostomy with 50 cc recorded overnight.  - labs pending this am  - ambulated yesterday  - will discuss with staff about removal of red rubber today    Juan Mason  [facility]   PGY 1

## 2022-05-23 NOTE — MEDICAL/APP STUDENT
[facility] - Purple Team  Daily Progress Note    Subjective:  NAEON. Complains of diffuse pain but overall well controlled with PRNs. Continues to tolerate clear liquid diet. No n/v. Ambulating without difficulty or significant pain. No urinary complaints; 1700 mL urine output ON.     Objective:    Vitals:  Vitals:    05/23/22 0351   BP: (!) 165/91   Pulse: 80   Resp:    Temp: 98.3 °F (36.8 °C)        Intake/Output:  2102:3400    Physical Exam:  Gen: NAD  Neuro: awake, alert, answering questions appropriately  CV: RRR  Resp: non-labored breathing, CONCHITA  Abd: soft, ND, NT. Ostomy bag in place with brown-tinged output. Surrounding erythema around ostomy site; minimally tender.    Ext: moves all 4 spontaneously and purposefully  Skin: warm, well perfused    Labs:  CBC, CMP: Needs to be collected    Assessment/Plan:  57 yo male with no pertinent medical history who presented with foreign body in rectum. Required ex-lap due to inability to remove manually. POD4 after surgical removal from rectum with ostomy placement.     - Transition to solid diet  - Continue multimodal pain control  - Wound care consulted for ostomy teaching  - PT, OOB, IS  - [facility]    Jesse Ashton   [facility], MS3  05/23/2022 5:42 AM

## 2022-05-23 NOTE — NURSING
Upon rounds noted patient moaning. When asked what's wrong stated I'm hurting. Night nurse reported just gave PRN pain med's. Patient pain has increased overnight. Upon assessing incision site noted Redness, swelling and blisters. I called treatment nurse, and MD to voice concerns. Wound care nurse came right away. Patient medicated for pain. Will continue to observe.

## 2022-05-24 LAB
ALBUMIN SERPL-MCNC: 2.6 GM/DL (ref 3.5–5)
ALBUMIN/GLOB SERPL: 0.7 RATIO (ref 1.1–2)
ALP SERPL-CCNC: 366 UNIT/L (ref 40–150)
ALT SERPL-CCNC: 50 UNIT/L (ref 0–55)
AST SERPL-CCNC: 44 UNIT/L (ref 5–34)
BASOPHILS # BLD AUTO: 0.04 X10(3)/MCL (ref 0–0.2)
BASOPHILS NFR BLD AUTO: 0.5 %
BILIRUBIN DIRECT+TOT PNL SERPL-MCNC: 0.5 MG/DL
BUN SERPL-MCNC: 10.4 MG/DL (ref 8.4–25.7)
CALCIUM SERPL-MCNC: 8.9 MG/DL (ref 8.4–10.2)
CHLORIDE SERPL-SCNC: 103 MMOL/L (ref 98–107)
CO2 SERPL-SCNC: 27 MMOL/L (ref 22–29)
CREAT SERPL-MCNC: 1.05 MG/DL (ref 0.73–1.18)
EOSINOPHIL # BLD AUTO: 0.47 X10(3)/MCL (ref 0–0.9)
EOSINOPHIL NFR BLD AUTO: 6.5 %
ERYTHROCYTE [DISTWIDTH] IN BLOOD BY AUTOMATED COUNT: 12.9 % (ref 11.5–17)
GLOBULIN SER-MCNC: 3.6 GM/DL (ref 2.4–3.5)
GLUCOSE SERPL-MCNC: 121 MG/DL (ref 74–100)
HCT VFR BLD AUTO: 39.6 % (ref 42–52)
HGB BLD-MCNC: 12.9 GM/DL (ref 14–18)
IMM GRANULOCYTES # BLD AUTO: 0.05 X10(3)/MCL (ref 0–0.02)
IMM GRANULOCYTES NFR BLD AUTO: 0.7 % (ref 0–0.43)
LYMPHOCYTES # BLD AUTO: 2.21 X10(3)/MCL (ref 0.6–4.6)
LYMPHOCYTES NFR BLD AUTO: 30.4 %
MAGNESIUM SERPL-MCNC: 1.8 MG/DL (ref 1.6–2.6)
MCH RBC QN AUTO: 30.5 PG (ref 27–31)
MCHC RBC AUTO-ENTMCNC: 32.6 MG/DL (ref 33–36)
MCV RBC AUTO: 93.6 FL (ref 80–94)
MONOCYTES # BLD AUTO: 0.73 X10(3)/MCL (ref 0.1–1.3)
MONOCYTES NFR BLD AUTO: 10 %
NEUTROPHILS # BLD AUTO: 3.8 X10(3)/MCL (ref 2.1–9.2)
NEUTROPHILS NFR BLD AUTO: 51.9 %
NRBC BLD AUTO-RTO: 0 %
PHOSPHATE SERPL-MCNC: 3.8 MG/DL (ref 2.3–4.7)
PLATELET # BLD AUTO: 302 X10(3)/MCL (ref 130–400)
PMV BLD AUTO: 9.5 FL (ref 9.4–12.4)
POTASSIUM SERPL-SCNC: 3.8 MMOL/L (ref 3.5–5.1)
PROT SERPL-MCNC: 6.2 GM/DL (ref 6.4–8.3)
RBC # BLD AUTO: 4.23 X10(6)/MCL (ref 4.7–6.1)
SODIUM SERPL-SCNC: 140 MMOL/L (ref 136–145)
WBC # SPEC AUTO: 7.3 X10(3)/MCL (ref 4.5–11.5)

## 2022-05-24 PROCEDURE — 97116 GAIT TRAINING THERAPY: CPT

## 2022-05-24 PROCEDURE — 83735 ASSAY OF MAGNESIUM: CPT | Performed by: STUDENT IN AN ORGANIZED HEALTH CARE EDUCATION/TRAINING PROGRAM

## 2022-05-24 PROCEDURE — 84100 ASSAY OF PHOSPHORUS: CPT | Performed by: STUDENT IN AN ORGANIZED HEALTH CARE EDUCATION/TRAINING PROGRAM

## 2022-05-24 PROCEDURE — 80053 COMPREHEN METABOLIC PANEL: CPT | Performed by: STUDENT IN AN ORGANIZED HEALTH CARE EDUCATION/TRAINING PROGRAM

## 2022-05-24 PROCEDURE — 25000003 PHARM REV CODE 250

## 2022-05-24 PROCEDURE — 85025 COMPLETE CBC W/AUTO DIFF WBC: CPT | Performed by: STUDENT IN AN ORGANIZED HEALTH CARE EDUCATION/TRAINING PROGRAM

## 2022-05-24 PROCEDURE — 11000001 HC ACUTE MED/SURG PRIVATE ROOM

## 2022-05-24 PROCEDURE — 36415 COLL VENOUS BLD VENIPUNCTURE: CPT | Performed by: STUDENT IN AN ORGANIZED HEALTH CARE EDUCATION/TRAINING PROGRAM

## 2022-05-24 PROCEDURE — 25000003 PHARM REV CODE 250: Performed by: STUDENT IN AN ORGANIZED HEALTH CARE EDUCATION/TRAINING PROGRAM

## 2022-05-24 PROCEDURE — 63600175 PHARM REV CODE 636 W HCPCS: Performed by: STUDENT IN AN ORGANIZED HEALTH CARE EDUCATION/TRAINING PROGRAM

## 2022-05-24 PROCEDURE — 21400001 HC TELEMETRY ROOM

## 2022-05-24 PROCEDURE — 94761 N-INVAS EAR/PLS OXIMETRY MLT: CPT

## 2022-05-24 RX ORDER — POLYETHYLENE GLYCOL 3350 17 G/17G
17 POWDER, FOR SOLUTION ORAL DAILY
Status: DISCONTINUED | OUTPATIENT
Start: 2022-05-24 | End: 2022-05-25

## 2022-05-24 RX ORDER — LABETALOL HCL 20 MG/4 ML
10 SYRINGE (ML) INTRAVENOUS EVERY 6 HOURS PRN
Status: DISCONTINUED | OUTPATIENT
Start: 2022-05-24 | End: 2022-05-27 | Stop reason: HOSPADM

## 2022-05-24 RX ORDER — HYDRALAZINE HYDROCHLORIDE 20 MG/ML
10 INJECTION INTRAMUSCULAR; INTRAVENOUS EVERY 6 HOURS PRN
Status: DISCONTINUED | OUTPATIENT
Start: 2022-05-24 | End: 2022-05-27 | Stop reason: HOSPADM

## 2022-05-24 RX ADMIN — METHOCARBAMOL 750 MG: 750 TABLET ORAL at 01:05

## 2022-05-24 RX ADMIN — POLYETHYLENE GLYCOL 3350 17 G: 17 POWDER, FOR SOLUTION ORAL at 03:05

## 2022-05-24 RX ADMIN — METHOCARBAMOL 750 MG: 750 TABLET ORAL at 09:05

## 2022-05-24 RX ADMIN — OXYCODONE HYDROCHLORIDE 10 MG: 5 TABLET ORAL at 04:05

## 2022-05-24 RX ADMIN — ACETAMINOPHEN 1000 MG: 500 TABLET ORAL at 05:05

## 2022-05-24 RX ADMIN — GABAPENTIN 300 MG: 300 CAPSULE ORAL at 08:05

## 2022-05-24 RX ADMIN — GABAPENTIN 300 MG: 300 CAPSULE ORAL at 03:05

## 2022-05-24 RX ADMIN — HEPARIN SODIUM 5000 UNITS: 5000 INJECTION INTRAVENOUS; SUBCUTANEOUS at 03:05

## 2022-05-24 RX ADMIN — HEPARIN SODIUM 5000 UNITS: 5000 INJECTION INTRAVENOUS; SUBCUTANEOUS at 08:05

## 2022-05-24 RX ADMIN — SODIUM CHLORIDE, POTASSIUM CHLORIDE, SODIUM LACTATE AND CALCIUM CHLORIDE: 600; 310; 30; 20 INJECTION, SOLUTION INTRAVENOUS at 10:05

## 2022-05-24 RX ADMIN — MELATONIN TAB 3 MG 6 MG: 3 TAB at 09:05

## 2022-05-24 RX ADMIN — METHOCARBAMOL 750 MG: 750 TABLET ORAL at 08:05

## 2022-05-24 RX ADMIN — HEPARIN SODIUM 5000 UNITS: 5000 INJECTION INTRAVENOUS; SUBCUTANEOUS at 09:05

## 2022-05-24 RX ADMIN — OXYCODONE HYDROCHLORIDE 10 MG: 5 TABLET ORAL at 05:05

## 2022-05-24 RX ADMIN — ACETAMINOPHEN 1000 MG: 500 TABLET ORAL at 01:05

## 2022-05-24 RX ADMIN — METHOCARBAMOL 750 MG: 750 TABLET ORAL at 04:05

## 2022-05-24 RX ADMIN — ACETAMINOPHEN 1000 MG: 500 TABLET ORAL at 09:05

## 2022-05-24 RX ADMIN — PIPERACILLIN SODIUM AND TAZOBACTAM SODIUM 4.5 G: 4; .5 INJECTION, POWDER, LYOPHILIZED, FOR SOLUTION INTRAVENOUS at 06:05

## 2022-05-24 RX ADMIN — PIPERACILLIN SODIUM AND TAZOBACTAM SODIUM 4.5 G: 4; .5 INJECTION, POWDER, LYOPHILIZED, FOR SOLUTION INTRAVENOUS at 10:05

## 2022-05-24 RX ADMIN — TAMSULOSIN HYDROCHLORIDE 0.4 MG: 0.4 CAPSULE ORAL at 08:05

## 2022-05-24 RX ADMIN — GABAPENTIN 300 MG: 300 CAPSULE ORAL at 09:05

## 2022-05-24 RX ADMIN — PIPERACILLIN SODIUM AND TAZOBACTAM SODIUM 4.5 G: 4; .5 INJECTION, POWDER, LYOPHILIZED, FOR SOLUTION INTRAVENOUS at 03:05

## 2022-05-24 NOTE — MEDICAL/APP STUDENT
[facility] General Surgery - Purple Team  Daily Progress Note      Subjective:  NAEON. He notes his pain his is better controlled overnight. Tolerating solid diet, denies nausea and vomiting. He is ambulating and using IS. He is voiding without issue. He has a dark, partially formed output in his ostomy bag with a noted 30cc liquid output. He notes occasional SOB at rest.        He has remained afebrile. He did have SBP up to the 170s yesterday, but ranged mostly in the 140s with DBP to  90s, ranging most often in the 80s.     Objective:    Vitals:  Vitals:    05/24/22 0446   BP:    Pulse:    Resp: 18   Temp:         Intake/Output:  Urine output- 1,701 (0.84 ml/kg/hr)    Physical Exam:  Gen: NAD  Neuro: awake, alert, answering questions appropriately  Resp: non-labored breathing  Abd: soft, ND, tenderness, dressing in place, ostomy in place with dark, thick output with pink stroma   Ext: moves all 4 spontaneously and purposefully      Labs:  - WBC- 7.3 (9.4)  - Alk Phos- 366 (343)      Assessment/Plan:  57 yo male with no pertinent medical history who presented with foreign body in rectum. Required ex-lap due to inability to remove manually. POD6 after surgical removal from rectum with ostomy placement.    - Continue multimodal pain control  - Wound care consulted for ostomy education   - Continue solid diet  - Monitor ostomy output    Doris Swain   [facility] General Surgery, MS3  05/24/2022 5:38 AM

## 2022-05-24 NOTE — PLAN OF CARE
Contacted by Skylar Laird with Financial Assistance who reported pt is eligible for M/D and will receive plan # within 7-10 days. Relayed same to pt and Maye Prasad, Wound Care nurse.

## 2022-05-24 NOTE — PROGRESS NOTES
[facility] - Purple Team  Daily Progress Note        Subjective:  NAEON. He notes his pain his is better controlled overnight. Tolerating solid diet, denies nausea and vomiting. He is ambulating and using IS. He is voiding without issue. He has a dark, partially formed output in his ostomy bag with a noted 30cc liquid output. He notes occasional SOB at rest.         He has remained afebrile. He did have SBP up to the 170s yesterday, but ranged mostly in the 140s with DBP to  90s, ranging most often in the 80s.      Objective:     Vitals:      Vitals:     05/24/22 0446   BP:     Pulse:     Resp: 18   Temp:           Intake/Output:  Urine output- 1,701 (0.84 ml/kg/hr)     Physical Exam:  Gen: NAD  Neuro: awake, alert, answering questions appropriately  Resp: non-labored breathing  Abd: soft, ND, tenderness, dressing in place, ostomy in place with dark, thick output with pink stroma   Ext: moves all 4 spontaneously and purposefully        Labs:  - WBC- 7.3 (9.4)  - Alk Phos- 366 (343)        Assessment/Plan:  55 yo male with no pertinent medical history who presented with foreign body in rectum. Required ex-lap due to inability to remove manually. POD5 after surgical removal from rectum with ostomy placement.     - Continue multimodal pain control  - Wound care consulted for ostomy education   - Continue solid diet  - Monitor ostomy output     Doris Swain   [facility] General Surgery, MS3  05/24/2022 5:38 AM     Addendum  Patient seen and examined this morning.  Agree with above.    55 yo male POD 5 ex lap with anterior rectotomy and diverting loop colostomy creation. Concern for post operative infection around exlap site. Staples removed yesterday. Wound packed with wet to drys. Wound looking better today. WBC improved from yesterday. Will cont wet to drys, abx and wound care for ostomy.      Juan Mason  [facility] General Surgery   PGY 1

## 2022-05-24 NOTE — PT/OT/SLP PROGRESS
Physical Therapy      Patient Name:  Isaías Prasad   MRN:  [id number]    Patient not seen today secondary to Other (Comment) (pt not available 2/2 medicaid application being processed by representative...will continue tx attempts as ordered). Will follow-up later today as patient is available to participate.

## 2022-05-24 NOTE — PT/OT/SLP PROGRESS
Physical Therapy Treatment    Patient Name:  Isaías Prasad   MRN:  [id number]    Recommendations:     Discharge Recommendations:  other (see comments) (LTAC vs. home w/ responsible caregiver w/ HH w/ HHPT)   Discharge Equipment Recommendations: other (see comments) (rolling walker vs. straight cane vs. none)   Barriers to discharge: None    Assessment:     Isaías Prasad is a 56 y.o. male admitted with a medical diagnosis of rectal foreign body w/ s/p surgical removal.     Problem List Items Addressed This Visit    None     Visit Diagnoses     Rectal foreign body, initial encounter    -  Primary    Relevant Orders    Insert peripheral IV    Height and weight (Completed)    Notify Physician - Potential Need of Opioid Reversal    Full code    Place in Observation (Completed)    Vital Signs (specify frequency)    Place sequential compression device    COVID-19 Rapid Screening (Completed)    CBC Auto Differential (Completed)    Basic Metabolic Panel (Completed)    CBC Auto Differential (Completed)    Basic Metabolic Panel (Completed)    PT evaluate and treat    Incentive spirometry (Completed)    Inpatient consult to Social Work/Case Management (Completed)    Basic Metabolic Panel (Completed)    CBC Auto Differential (Completed)    Serrano catheter - discontinue (Completed)    WALKER FOR HOME USE    Phosphorus (Completed)    Magnesium (Completed)    Basic Metabolic Panel (Completed)    CBC Auto Differential (Completed)    X-Ray Abdomen Flat And Erect (Completed)    Diet Adult Regular    Rectal foreign body        Relevant Orders    X-Ray Abdomen Flat And Erect (Completed)    Case Request Operating Room - [facility]: Exam under anesthesia, removal of rectal foreign body (Completed)    Interventional Radiology (Completed)    Specimen to Pathology (Completed)        He presents with the following impairments/functional limitations:  weakness, impaired functional mobilty, gait instability, impaired balance, pain .    Rehab Prognosis:  Good; patient would benefit from acute skilled PT services to address these deficits and reach maximum level of function.      -continued supervised/assisted OOB, transfers, up-to-chair, and ambulation w/ and w/o device w/ progression as tolerated/appropriate    Recent Surgery: Procedure(s) (LRB):  Exam under anesthesia, removal of rectal foreign body (N/A)  LAPAROTOMY, EXPLORATORY (N/A) 6 Days Post-Op    Plan:     During this hospitalization, patient to be seen  (3-5 TIMES/WEEK) to address the identified rehab impairments via gait training, therapeutic activities, therapeutic exercises and progress toward the following goals:    · Plan of Care Expires:  06/18/22    Subjective     Chief Complaint: want to go home  Patient/Family Comments/goals: return home to prior level of function  Pain/Comfort:  · Pain Rating 1: 6/10  · Location - Side 1: Bilateral  · Location - Orientation 1: lower  · Location 1: abdomen  · Pain Addressed 1: Reposition, Distraction, Nurse notified      Objective:     Communicated with pt's nurse Hallie prior to session.  Patient found supine in bed w/ HOB elevated w/ HOB rails up with peripheral IV, telemetry, Other (comments) upon PT entry to room.     General Precautions: Standard,  (ostomy bag)   Orthopedic Precautions:Full weight bearing   Braces: N/A  Respiratory Status: Room air     Functional Mobility:  · Bed Mobility:     · Rolling Left:  independence  · Scooting: independence  · Supine to Sit: independence  · Transfers:     · Sit to Stand:  modified independence with no AD  · -pt face mask in place  · Gait: SBA w/ RW ambulation x195ft w/ sitting rest x4 minutes, CGA ambulation w/o device w/ HHA only x260ft  · -slowed mvmts  · -decreased aleksandr/bilat step length  · -good upright posture  · -no loss of balance  · -no mis-steps  · -guarded mvmts - all transitional activities      AM-PAC 6 CLICK MOBILITY          Therapeutic Activities and Exercises:  -sitting balance activities - donned  bilat  socks, donned 2nd (back) gown    Patient left up in chair with all lines intact, call button in reach, pt's nurse Hallie notified and tray table at bedside..    GOALS:   Multidisciplinary Problems     Physical Therapy Goals        Problem: Physical Therapy    Goal Priority Disciplines Outcome Goal Variances Interventions   Physical Therapy Goal     PT, PT/OT Ongoing, Progressing     Description: Goals to be met by: discharge       Patient will increase functional independence with mobility by performin. Supine to sit with Modified [place] - met 2022    2. Sit to supine with Modified [place]  - met 2022    3. Rolling to Left and Right with Modified [place]  - met 2022    4. Sit to stand transfer with Modified [place] - met 2022    5. Gait  x 260 feet with  Supervision using Rolling Walker - ongoing    6. Ascend/descend 3 stair with right Handrails Minimal Assistance using No Assistive Device - ongoing                     Time Tracking:     PT Received On: 22  PT Start Time: 1040     PT Stop Time: 1103  PT Total Time (min): 23 min     Billable Minutes: Gait Training 23    Treatment Type: Treatment  PT/PTA: PT           2022

## 2022-05-24 NOTE — PROGRESS NOTES
Patient is seen at bedside to follow up on LLQ ostomy appliance. Midline abdominal dressing was changed this morning per surgery. Ostomy appliance noted with leakage and stool on midline dressing. Dressing was removed leaving uncontaminated deep packing in place. Plans for wound vac to midline when ok with surgery. Ostomy noted with formed stool that seems to have pushed the appliance off. If this persists pt may need medical management, I will discuss with surgery. Abdominal erythema slightly better today and pt reports decreased pain. Area cleaned well and new appliance applied with a good seal. Maintaining a good seal remains difficult due to proximity to midline incision and presence of red rubber catheter. CM notified that pt will most likely be given a medicaid plan in 7-10 days. This is great news as there was no one at home willing and able to help with complex wound and ostomy care. Once assigned a plan I will work to get pt a home vac, HH and wound care clinic follow up.

## 2022-05-25 LAB
BASOPHILS # BLD AUTO: 0.05 X10(3)/MCL (ref 0–0.2)
BASOPHILS NFR BLD AUTO: 0.7 %
EOSINOPHIL # BLD AUTO: 0.36 X10(3)/MCL (ref 0–0.9)
EOSINOPHIL NFR BLD AUTO: 5.2 %
ERYTHROCYTE [DISTWIDTH] IN BLOOD BY AUTOMATED COUNT: 12.9 % (ref 11.5–17)
HCT VFR BLD AUTO: 39.4 % (ref 42–52)
HGB BLD-MCNC: 13 GM/DL (ref 14–18)
IMM GRANULOCYTES # BLD AUTO: 0.07 X10(3)/MCL (ref 0–0.02)
IMM GRANULOCYTES NFR BLD AUTO: 1 % (ref 0–0.43)
LYMPHOCYTES # BLD AUTO: 1.7 X10(3)/MCL (ref 0.6–4.6)
LYMPHOCYTES NFR BLD AUTO: 24.5 %
MCH RBC QN AUTO: 30.7 PG (ref 27–31)
MCHC RBC AUTO-ENTMCNC: 33 MG/DL (ref 33–36)
MCV RBC AUTO: 92.9 FL (ref 80–94)
MONOCYTES # BLD AUTO: 0.6 X10(3)/MCL (ref 0.1–1.3)
MONOCYTES NFR BLD AUTO: 8.6 %
NEUTROPHILS # BLD AUTO: 4.2 X10(3)/MCL (ref 2.1–9.2)
NEUTROPHILS NFR BLD AUTO: 60 %
NRBC BLD AUTO-RTO: 0 %
PLATELET # BLD AUTO: 294 X10(3)/MCL (ref 130–400)
PMV BLD AUTO: 9.7 FL (ref 9.4–12.4)
RBC # BLD AUTO: 4.24 X10(6)/MCL (ref 4.7–6.1)
WBC # SPEC AUTO: 6.9 X10(3)/MCL (ref 4.5–11.5)

## 2022-05-25 PROCEDURE — 11000001 HC ACUTE MED/SURG PRIVATE ROOM

## 2022-05-25 PROCEDURE — 63600175 PHARM REV CODE 636 W HCPCS: Performed by: STUDENT IN AN ORGANIZED HEALTH CARE EDUCATION/TRAINING PROGRAM

## 2022-05-25 PROCEDURE — 36415 COLL VENOUS BLD VENIPUNCTURE: CPT | Performed by: SURGERY

## 2022-05-25 PROCEDURE — 85025 COMPLETE CBC W/AUTO DIFF WBC: CPT | Performed by: STUDENT IN AN ORGANIZED HEALTH CARE EDUCATION/TRAINING PROGRAM

## 2022-05-25 PROCEDURE — 25000003 PHARM REV CODE 250

## 2022-05-25 PROCEDURE — 25000003 PHARM REV CODE 250: Performed by: STUDENT IN AN ORGANIZED HEALTH CARE EDUCATION/TRAINING PROGRAM

## 2022-05-25 PROCEDURE — 21400001 HC TELEMETRY ROOM

## 2022-05-25 PROCEDURE — 94761 N-INVAS EAR/PLS OXIMETRY MLT: CPT

## 2022-05-25 PROCEDURE — 36415 COLL VENOUS BLD VENIPUNCTURE: CPT | Performed by: STUDENT IN AN ORGANIZED HEALTH CARE EDUCATION/TRAINING PROGRAM

## 2022-05-25 RX ORDER — MORPHINE SULFATE 2 MG/ML
2 INJECTION, SOLUTION INTRAMUSCULAR; INTRAVENOUS ONCE
Status: COMPLETED | OUTPATIENT
Start: 2022-05-25 | End: 2022-05-25

## 2022-05-25 RX ADMIN — GABAPENTIN 300 MG: 300 CAPSULE ORAL at 08:05

## 2022-05-25 RX ADMIN — HEPARIN SODIUM 5000 UNITS: 5000 INJECTION INTRAVENOUS; SUBCUTANEOUS at 09:05

## 2022-05-25 RX ADMIN — PIPERACILLIN SODIUM AND TAZOBACTAM SODIUM 4.5 G: 4; .5 INJECTION, POWDER, LYOPHILIZED, FOR SOLUTION INTRAVENOUS at 06:05

## 2022-05-25 RX ADMIN — MELATONIN TAB 3 MG 6 MG: 3 TAB at 09:05

## 2022-05-25 RX ADMIN — METHOCARBAMOL 750 MG: 750 TABLET ORAL at 09:05

## 2022-05-25 RX ADMIN — GABAPENTIN 300 MG: 300 CAPSULE ORAL at 03:05

## 2022-05-25 RX ADMIN — GABAPENTIN 300 MG: 300 CAPSULE ORAL at 09:05

## 2022-05-25 RX ADMIN — HEPARIN SODIUM 5000 UNITS: 5000 INJECTION INTRAVENOUS; SUBCUTANEOUS at 08:05

## 2022-05-25 RX ADMIN — ACETAMINOPHEN 1000 MG: 500 TABLET ORAL at 06:05

## 2022-05-25 RX ADMIN — METHOCARBAMOL 750 MG: 750 TABLET ORAL at 01:05

## 2022-05-25 RX ADMIN — PIPERACILLIN SODIUM AND TAZOBACTAM SODIUM 4.5 G: 4; .5 INJECTION, POWDER, LYOPHILIZED, FOR SOLUTION INTRAVENOUS at 03:05

## 2022-05-25 RX ADMIN — MORPHINE SULFATE 2 MG: 2 INJECTION, SOLUTION INTRAMUSCULAR; INTRAVENOUS at 01:05

## 2022-05-25 RX ADMIN — MORPHINE SULFATE 4 MG: 2 INJECTION, SOLUTION INTRAMUSCULAR; INTRAVENOUS at 11:05

## 2022-05-25 RX ADMIN — PIPERACILLIN SODIUM AND TAZOBACTAM SODIUM 4.5 G: 4; .5 INJECTION, POWDER, LYOPHILIZED, FOR SOLUTION INTRAVENOUS at 11:05

## 2022-05-25 RX ADMIN — METHOCARBAMOL 750 MG: 750 TABLET ORAL at 08:05

## 2022-05-25 RX ADMIN — OXYCODONE HYDROCHLORIDE 10 MG: 5 TABLET ORAL at 08:05

## 2022-05-25 RX ADMIN — ACETAMINOPHEN 1000 MG: 500 TABLET ORAL at 01:05

## 2022-05-25 RX ADMIN — TAMSULOSIN HYDROCHLORIDE 0.4 MG: 0.4 CAPSULE ORAL at 08:05

## 2022-05-25 RX ADMIN — METHOCARBAMOL 750 MG: 750 TABLET ORAL at 04:05

## 2022-05-25 RX ADMIN — ACETAMINOPHEN 1000 MG: 500 TABLET ORAL at 09:05

## 2022-05-25 RX ADMIN — HEPARIN SODIUM 5000 UNITS: 5000 INJECTION INTRAVENOUS; SUBCUTANEOUS at 03:05

## 2022-05-25 NOTE — PT/OT/SLP PROGRESS
Physical Therapy - Missed Treatment Note      Patient Name:  Isaías Prasad   MRN:  [id number]    Patient not seen today secondary to Patient unwilling to participate, Other (Comment) (will continued treatment attempts as ordered). Will follow-up 05/26/2022 or as patient available.

## 2022-05-25 NOTE — MEDICAL/APP STUDENT
[facility] General Surgery - Purple Team  Daily Progress Note      Subjective:  NAEON. Patient note slight increase in pain this morning, but well controlled overnight. He is ambulating, denies pain in his lower extremities or SOB. He is urinating without dysuria. He is tolerating his diet and denies N/V. He denies subjective fever or chills. Pt remained afebrile over night. SBP elevated to 160s and DPB elevated to 90s, however BP ranged 130s-140s/ 60s-80s.     Objective:    Vitals:  Vitals:    05/25/22 0407   BP: (!) 162/94   Pulse: 72   Resp:    Temp: 98.2 °F (36.8 °C)        Intake/Output:  Urine Output: 2,600 ml (1.3 ml/kg/hr)     Physical Exam:  Gen: NAD  Neuro: awake, alert, answering questions appropriately  Resp: non-labored breathing  Abd: soft, ND, tenderness to palpation. Decrease erythema around the wound. Wound packed. Ostomy bag in place with dark brown formed stools and dark brown liquid. Mucosa is pink.  Ext: moves all 4 spontaneously and purposefully      Labs:  CBC- pending     Imaging:  No new imaging     Micro/Path/Other:  No new path    Assessment/Plan:  57 yo male POD 7 ex lap with anterior rectotomy and diverting loop colostomy creation with concern for post operative infection around exlap site. Staple removed on 5/23, decrease erythema surrounding wound today.     - Continue antibiotics (Zosyn day 3)  - Wound care consulted for ostomy  - Continue multimodal pain control   - Continue solid diet     Doris Swain   [facility] General Surgery, MS3  05/25/2022 5:32 AM

## 2022-05-25 NOTE — PROGRESS NOTES
Patient is seen at bedside with surgery resident for them to remove red rubber ostomy bridge. This required removal of appliance and midline dressing. Drain was easily removed. Area was irrigated well then packed with collagen and isolated with hydrocolloid dressing. Ostomy appliance then applied over top with a good seal noted. Pt continues to have hard stool that is pushing the appliance off of the skin, this was relayed to surgery again. Midline incision wound is free from purulence or nonviable tissue. Per surgery fascia is intact and pt is ready for wound vac. Open wound measured [id number] with red granular wound base. Vac applied with a good seal that was very difficult to obtain due to proximity to to ostomy stoma. Wound care to follow up in am. Pending medicaid plan assignment to order home vac and HH.

## 2022-05-25 NOTE — PROGRESS NOTES
[facility] General Surgery - Purple Team  Daily Progress Note        Subjective:  NAEON. Patient note slight increase in pain this morning, but well controlled overnight. He is ambulating, denies pain in his lower extremities or SOB. He is urinating without dysuria. He is tolerating his diet and denies N/V. He denies subjective fever or chills. Pt remained afebrile over night. SBP elevated to 160s and DPB elevated to 90s, however BP ranged 130s-140s/ 60s-80s.      Objective:     Vitals:      Vitals:     05/25/22 0407   BP: (!) 162/94   Pulse: 72   Resp:     Temp: 98.2 °F (36.8 °C)         Intake/Output:  Urine Output: 2,600 ml (1.3 ml/kg/hr)      Physical Exam:  Gen: NAD  Neuro: awake, alert, answering questions appropriately  Resp: non-labored breathing  Abd: soft, ND, tenderness to palpation. Decrease erythema around the wound. Wound packed. Ostomy bag in place with dark brown formed stools and dark brown liquid. Mucosa is pink.  Ext: moves all 4 spontaneously and purposefully        Labs:  CBC- pending      Imaging:  No new imaging      Micro/Path/Other:  No new path     Assessment/Plan:  55 yo male POD 7 ex lap with anterior rectotomy and diverting loop colostomy creation with concern for post operative infection around exlap site. Staple removed on 5/23, decrease erythema surrounding wound today.      - Continue antibiotics (Zosyn day 3)  - Wound care consulted for ostomy  - Continue multimodal pain control   - Continue solid diet      Doris Swain   [facility] General Surgery, MS3  05/25/2022 5:32 AM       Addendum  Patient seen and examined this morning.  Agree with above.  - will remove red rubber today  - wet to dry dressing changed this am but will talk with woundcare as believe he is ready for wound vac.  - f/u insurance status     Juan Mason  [facility] General Surgery   PGY 1

## 2022-05-26 PROBLEM — Z72.0 TOBACCO USER: Status: ACTIVE | Noted: 2022-05-26

## 2022-05-26 PROBLEM — I10 HYPERTENSION: Status: ACTIVE | Noted: 2022-05-26

## 2022-05-26 PROBLEM — G45.9 TRANSIENT ISCHEMIC ATTACK: Status: ACTIVE | Noted: 2022-05-26

## 2022-05-26 PROCEDURE — 25000003 PHARM REV CODE 250: Performed by: STUDENT IN AN ORGANIZED HEALTH CARE EDUCATION/TRAINING PROGRAM

## 2022-05-26 PROCEDURE — 25000003 PHARM REV CODE 250

## 2022-05-26 PROCEDURE — 94761 N-INVAS EAR/PLS OXIMETRY MLT: CPT

## 2022-05-26 PROCEDURE — 63600175 PHARM REV CODE 636 W HCPCS: Performed by: STUDENT IN AN ORGANIZED HEALTH CARE EDUCATION/TRAINING PROGRAM

## 2022-05-26 PROCEDURE — 21400001 HC TELEMETRY ROOM

## 2022-05-26 PROCEDURE — 11000001 HC ACUTE MED/SURG PRIVATE ROOM

## 2022-05-26 PROCEDURE — 97116 GAIT TRAINING THERAPY: CPT

## 2022-05-26 RX ORDER — DOCUSATE SODIUM 100 MG/1
100 CAPSULE, LIQUID FILLED ORAL 2 TIMES DAILY
Status: DISCONTINUED | OUTPATIENT
Start: 2022-05-26 | End: 2022-05-27 | Stop reason: HOSPADM

## 2022-05-26 RX ORDER — POLYETHYLENE GLYCOL 3350 17 G/17G
17 POWDER, FOR SOLUTION ORAL DAILY
Status: DISCONTINUED | OUTPATIENT
Start: 2022-05-26 | End: 2022-05-27 | Stop reason: HOSPADM

## 2022-05-26 RX ADMIN — POLYETHYLENE GLYCOL 3350 17 G: 17 POWDER, FOR SOLUTION ORAL at 08:05

## 2022-05-26 RX ADMIN — GABAPENTIN 300 MG: 300 CAPSULE ORAL at 08:05

## 2022-05-26 RX ADMIN — METHOCARBAMOL 750 MG: 750 TABLET ORAL at 12:05

## 2022-05-26 RX ADMIN — OXYCODONE HYDROCHLORIDE 10 MG: 5 TABLET ORAL at 03:05

## 2022-05-26 RX ADMIN — GABAPENTIN 300 MG: 300 CAPSULE ORAL at 02:05

## 2022-05-26 RX ADMIN — MELATONIN TAB 3 MG 6 MG: 3 TAB at 09:05

## 2022-05-26 RX ADMIN — PIPERACILLIN SODIUM AND TAZOBACTAM SODIUM 4.5 G: 4; .5 INJECTION, POWDER, LYOPHILIZED, FOR SOLUTION INTRAVENOUS at 06:05

## 2022-05-26 RX ADMIN — DOCUSATE SODIUM 100 MG: 100 CAPSULE, LIQUID FILLED ORAL at 09:05

## 2022-05-26 RX ADMIN — PIPERACILLIN SODIUM AND TAZOBACTAM SODIUM 4.5 G: 4; .5 INJECTION, POWDER, LYOPHILIZED, FOR SOLUTION INTRAVENOUS at 10:05

## 2022-05-26 RX ADMIN — ACETAMINOPHEN 1000 MG: 500 TABLET ORAL at 02:05

## 2022-05-26 RX ADMIN — TAMSULOSIN HYDROCHLORIDE 0.4 MG: 0.4 CAPSULE ORAL at 08:05

## 2022-05-26 RX ADMIN — METHOCARBAMOL 750 MG: 750 TABLET ORAL at 09:05

## 2022-05-26 RX ADMIN — HEPARIN SODIUM 5000 UNITS: 5000 INJECTION INTRAVENOUS; SUBCUTANEOUS at 09:05

## 2022-05-26 RX ADMIN — DOCUSATE SODIUM 100 MG: 100 CAPSULE, LIQUID FILLED ORAL at 08:05

## 2022-05-26 RX ADMIN — OXYCODONE HYDROCHLORIDE 10 MG: 5 TABLET ORAL at 08:05

## 2022-05-26 RX ADMIN — ACETAMINOPHEN 1000 MG: 500 TABLET ORAL at 09:05

## 2022-05-26 RX ADMIN — METHOCARBAMOL 750 MG: 750 TABLET ORAL at 08:05

## 2022-05-26 RX ADMIN — PIPERACILLIN SODIUM AND TAZOBACTAM SODIUM 4.5 G: 4; .5 INJECTION, POWDER, LYOPHILIZED, FOR SOLUTION INTRAVENOUS at 02:05

## 2022-05-26 RX ADMIN — HEPARIN SODIUM 5000 UNITS: 5000 INJECTION INTRAVENOUS; SUBCUTANEOUS at 08:05

## 2022-05-26 RX ADMIN — HEPARIN SODIUM 5000 UNITS: 5000 INJECTION INTRAVENOUS; SUBCUTANEOUS at 04:05

## 2022-05-26 RX ADMIN — ACETAMINOPHEN 1000 MG: 500 TABLET ORAL at 06:05

## 2022-05-26 RX ADMIN — METHOCARBAMOL 750 MG: 750 TABLET ORAL at 04:05

## 2022-05-26 RX ADMIN — GABAPENTIN 300 MG: 300 CAPSULE ORAL at 09:05

## 2022-05-26 NOTE — PT/OT/SLP PROGRESS
Physical Therapy  Missed Treatment Note  Patient Refusal Note      Patient Name:  Isaías Prasad   MRN:  [id number]    Patient not seen today secondary to Patient unwilling to participate, Other (Comment) (pt declined therapy session treatment attempt & pt's nurse Erum notified of pt's decision, will continue tx attempts as ordered). Will follow-up 05/27/2022.

## 2022-05-26 NOTE — PT/OT/SLP PROGRESS
Physical Therapy Treatment    Patient Name:  Isaías Prasad   MRN:  [id number]    Recommendations:     Discharge Recommendations:  LTAC vs. home w/ responsible caregiver w/ HH w/ wound care   Discharge Equipment Recommendations: cane, straight   Barriers to discharge: None    Assessment:     Isaías Prasad is a 56 y.o. male admitted with a medical diagnosis of rectal foreign body.    Patient Active Problem List   Diagnosis    Hypertension    Tobacco user    Transient ischemic attack      He presents with the following impairments/functional limitations:  weakness, impaired functional mobilty, gait instability, impaired balance, pain abdominal wound w/ wound vac, ostomy.    Rehab Prognosis: Good; patient would benefit from acute skilled PT services to address these deficits and reach maximum level of function.    -continued OOB, transfers, up-to-chair, ambulation w/ and w/o device w/ progression as tolerated/appropriate w/ supervision.    Recent Surgery: Procedure(s) (LRB):  Exam under anesthesia, removal of rectal foreign body (N/A)  LAPAROTOMY, EXPLORATORY (N/A) 8 Days Post-Op    Plan:     During this hospitalization, patient to be seen  (3-5 times/week) to address the identified rehab impairments via gait training, therapeutic activities, therapeutic exercises and progress toward the following goals:    · Plan of Care Expires:  06/18/22    Subjective     Chief Complaint: ready to go home  Patient/Family Comments/goals: return home to [facility]  Pain/Comfort:  · Pain Rating 1: 5/10  · Location - Side 1:  (surgical site)  · Location - Orientation 1: anterior  · Location 1: abdomen  · Pain Addressed 1: Reposition, Distraction, Nurse notified      Objective:     Communicated with pt's nurse Danielson prior to session.  Patient found supine in bed w/ HOB elevated w/ HOB rails up with peripheral IV, wound vac (ostomy) upon PT entry to room.     General Precautions: Standard, other (see comments) (post-op, wound, ostomy)    Orthopedic Precautions:Full weight bearing   Braces: N/A  Respiratory Status: Room air     Functional Mobility:  · Bed Mobility:     · Rolling Left:  independence  · Rolling Right: independence  · Scooting: independence  · Supine to Sit: independence  · Sit to Supine: independence  · Transfers:     · Sit to Stand:  modified independence with no AD  · Gait: modified independence w/ rolling walker 200ft w/ standing pause x2 min...100ft w/ standing pause x2 min...200ft w/ sitting rest x4 min...followded by SBA ambulation w/o device x390ft w/ no loss of balance, no mis-steps, good upright posture, symmetrical step length, symmetrical UE arm swing, guarded trunk mvmts    -pt face mask in place for out-of-room activities      Therapeutic Activities and Exercises:   -sat edge of bed  -donned 2nd back gown  -donned bilat  socks  -wound vac secured to IV pole for ambulation activities    Patient left supine in bed w/ HOB elevated w/ HOB rails up with all lines intact, call button in reach, pt's nurse Erum notified and tray table at bedside, wound vac..    GOALS:   Multidisciplinary Problems     Physical Therapy Goals        Problem: Physical Therapy    Goal Priority Disciplines Outcome Goal Variances Interventions   Physical Therapy Goal     PT, PT/OT Ongoing, Progressing     Description: Goals to be met by: discharge       Patient will increase functional independence with mobility by performin. Supine to sit with Modified [place] - met 2022    2. Sit to supine with Modified [place]  - met 2022    3. Rolling to Left and Right with Modified [place]  - met 2022    4. Sit to stand transfer with Modified [place] - met 2022    5. Gait  x 260 feet with  Supervision using Rolling Walker - met 2022    6. Ascend/descend 3 stair with right Handrails Minimal Assistance using No Assistive Device - ongoing                     Time Tracking:     PT Received On: 22  PT  Start Time: 0905     PT Stop Time: 0936  PT Total Time (min): 31 min     Billable Minutes: Gait Training 31    Treatment Type: Treatment  PT/PTA: PT           05/26/2022

## 2022-05-26 NOTE — MEDICAL/APP STUDENT
[facility] General Surgery - Purple Team  Daily Progress Note      Subjective:  Red rubber ostomy bridge drain removed and wound vac put in place yesterday. Patient was noted to have hard formed stool in his colostomy bag yesterday with an additional three soft, brown formed stool and removed overnight. No measurable liquid output from his colostomy bag. Patient denies feeling distended or full. He notes pain at 8/10 overnight which improved with oxycodone 10mg. He denies n/v and has continued to tolerate his diet. He did not work with PT yesterday, but notes continued use of IS and denies pain in the lower extremities. He has continued urine output with no dysuria.          Objective:    Vitals:  Vitals:    05/26/22 0350   BP: (!) 143/85   Pulse: 61   Resp: 20   Temp: 97.3 °F (36.3 °C)        Intake/Output:  Urine output: 3,000 ml (1.5 ml/kg/day)    Physical Exam:  Gen: NAD  Neuro: awake, alert, answering questions appropriate  Resp: non-labored breathing  Abd: soft, ND, mild tenderness. Wound vac in place. Colostomy bag in place with minimal brown liquid. Mucosa is pink   Ext: moves all 4 spontaneously and purposefully  Skin: warm, well perfused    Labs:  WBC- 7.3 (5/24/22) --> 6.9 (5/25/22)    Imaging:  No new imaging    Micro/Path/Other:  No new patholoy    Assessment/Plan:  55 yo male POD 8 ex lap with anterior rectotomy and diverting loop colostomy creation with concern for post operative infection around exlap site. Staple removed on 5/23.    - Continue antibiotics (Zosyn day 4)   - Wound care consulted for ostomy  - Continue multimodal pain control  - Continue solid diet  - Consider addition of stool softener   - Monitor wound vac output    -Monitor ostomy output with drain removal     Doris Swain   [facility] General Surgery, MS3  05/26/2022 6:07 AM

## 2022-05-26 NOTE — PROGRESS NOTES
[facility] General Surgery - Purple Team  Daily Progress Note        Subjective:  Red rubber ostomy bridge drain removed and wound vac put in place yesterday. Patient was noted to have hard formed stool in his colostomy bag yesterday with an additional three soft, brown formed stool and removed overnight. No measurable liquid output from his colostomy bag. Patient denies feeling distended or full. He notes pain at 8/10 overnight which improved with oxycodone 10mg. He denies n/v and has continued to tolerate his diet. He did not work with PT yesterday, but notes continued use of IS and denies pain in the lower extremities. He has continued urine output with no dysuria.             Objective:     Vitals:      Vitals:     05/26/22 0350   BP: (!) 143/85   Pulse: 61   Resp: 20   Temp: 97.3 °F (36.3 °C)         Intake/Output:  Urine output: 3,000 ml (1.5 ml/kg/day)     Physical Exam:  Gen: NAD  Neuro: awake, alert, answering questions appropriate  Resp: non-labored breathing  Abd: soft, ND, mild tenderness. Wound vac in place. Colostomy bag in place with minimal brown liquid. Mucosa is pink   Ext: moves all 4 spontaneously and purposefully  Skin: warm, well perfused     Labs:  WBC- 7.3 (5/24/22) --> 6.9 (5/25/22)     Imaging:  No new imaging     Micro/Path/Other:  No new patholoy     Assessment/Plan:  55 yo male POD 8 ex lap with anterior rectotomy and diverting loop colostomy creation with concern for post operative infection around exlap site. Staple removed on 5/23.     - Continue antibiotics (Zosyn day 4)   - Wound care consulted for ostomy  - Continue multimodal pain control  - Continue solid diet  - Consider addition of stool softener   - Monitor wound vac output    -Monitor ostomy output with drain removal      Doris Swain   [facility] General Surgery, MS3  05/26/2022 6:07 AM     Addendum  Patient seen and examined this morning.  Agree with above.  - adding stool softeners this AM  - Awaiting medicaid approval for home  wound vac.  - Unfortunately this may take several days. Patient will likely have to have education on wet to dry dressing changes until he can get approved for his wound vac.     Juan Mason  [facility] Surgery   PGY 1

## 2022-05-27 ENCOUNTER — DOCUMENTATION ONLY (OUTPATIENT)
Dept: PHARMACY | Facility: HOSPITAL | Age: 57
End: 2022-05-27

## 2022-05-27 VITALS
HEART RATE: 63 BPM | RESPIRATION RATE: 18 BRPM | WEIGHT: 185 LBS | OXYGEN SATURATION: 95 % | BODY MASS INDEX: 23.74 KG/M2 | TEMPERATURE: 98 F | HEIGHT: 74 IN | SYSTOLIC BLOOD PRESSURE: 144 MMHG | DIASTOLIC BLOOD PRESSURE: 93 MMHG

## 2022-05-27 PROBLEM — T18.5XXA RECTAL FOREIGN BODY: Status: ACTIVE | Noted: 2022-05-27

## 2022-05-27 PROCEDURE — 63600175 PHARM REV CODE 636 W HCPCS: Performed by: STUDENT IN AN ORGANIZED HEALTH CARE EDUCATION/TRAINING PROGRAM

## 2022-05-27 PROCEDURE — 25000003 PHARM REV CODE 250: Performed by: STUDENT IN AN ORGANIZED HEALTH CARE EDUCATION/TRAINING PROGRAM

## 2022-05-27 PROCEDURE — 94761 N-INVAS EAR/PLS OXIMETRY MLT: CPT

## 2022-05-27 PROCEDURE — 25000003 PHARM REV CODE 250

## 2022-05-27 RX ADMIN — ACETAMINOPHEN 1000 MG: 500 TABLET ORAL at 06:05

## 2022-05-27 RX ADMIN — TAMSULOSIN HYDROCHLORIDE 0.4 MG: 0.4 CAPSULE ORAL at 09:05

## 2022-05-27 RX ADMIN — OXYCODONE HYDROCHLORIDE 10 MG: 5 TABLET ORAL at 01:05

## 2022-05-27 RX ADMIN — OXYCODONE HYDROCHLORIDE 10 MG: 5 TABLET ORAL at 06:05

## 2022-05-27 RX ADMIN — PIPERACILLIN SODIUM AND TAZOBACTAM SODIUM 4.5 G: 4; .5 INJECTION, POWDER, LYOPHILIZED, FOR SOLUTION INTRAVENOUS at 06:05

## 2022-05-27 RX ADMIN — GABAPENTIN 300 MG: 300 CAPSULE ORAL at 09:05

## 2022-05-27 RX ADMIN — HEPARIN SODIUM 5000 UNITS: 5000 INJECTION INTRAVENOUS; SUBCUTANEOUS at 09:05

## 2022-05-27 RX ADMIN — DOCUSATE SODIUM 100 MG: 100 CAPSULE, LIQUID FILLED ORAL at 09:05

## 2022-05-27 RX ADMIN — METHOCARBAMOL 750 MG: 750 TABLET ORAL at 09:05

## 2022-05-27 RX ADMIN — OXYCODONE HYDROCHLORIDE 10 MG: 5 TABLET ORAL at 09:05

## 2022-05-27 RX ADMIN — POLYETHYLENE GLYCOL 3350 17 G: 17 POWDER, FOR SOLUTION ORAL at 09:05

## 2022-05-27 NOTE — DISCHARGE SUMMARY
"[facility] Surgery  Discharge Summary    Admit Date: 5/17/2022  Discharge Date: 5/27/2022  Admitting Physician: Chauncey Jimenez Jr., *  Consulting Physicians(s): None     Admission HPI:   55 yo male who presents due to foreign body in rectum. He notes two days ago that he "slipped and fell off the roof and landed on an object which may have glass". He waited until today to present as he was hoping it would pass on its own. He notes last PO intake of water a few hours earlier.    Hospital Course:   Foreign body removal was attempted and unsuccessful at bedside. Patient brought to OR for attempted removal under anesthesia which was unsuccessful for removal through anus. Removal required rectotomy with creation of diverting loop colostomy on 5/19/22. Patient hospital course was complicated by wound infection which required staple removal on 5/23/22 and antibiotics (Zosyn). Wound vac placed and erythema and drainage improved. Patient received training on wound and ostomy care. Patient was advanced to regular diet and tolerated well. Pt required stool softeners to improve ostomy output. Patient able to ambulate prior to discharge. Over course of hospital stay patient remained afebrile.     Procedures Performed:     5/18/2022:     1. Exam under anesthesia  2. Exploratory laparotomy  3. Foreign body removal through anterior rectotomy  4. Diverting loop colostomy creation    Final Diagnoses:   Active Hospital Problems    Diagnosis  POA    *Rectal foreign body [T18.5XXA]  Unknown      Resolved Hospital Problems   No resolved problems to display.       Discharge Condition: stable    Disposition: home     Follow-Up Plan: wound care- 5/30/22    Discharge Instructions:   Activity: no heavy lifting (aobve 15lbs), other activity as tolerated  Diet: regular   Wound Care: wound vac in place, follow-up outpatient with wound care     Discharge Medications:  Bettina 5 mg x15 tabs  Bactrim x7d  MiraLax qD    Doris Swain  [facility] " General Surgery MS3  05/27/2022 9:13 AM      PGY3 Addendum:  I examined the patient w/ MS3 Doris Swain and agree w/ her documentation above. My brief assessment follows:    Patient p/w rectal foreign body. Unable to be removed per rectum at bedside in ED or in OR. Ultimately required ex-lap w/ removal via anterior rectotomy. Diverted w/ loop ostomy.     Developed post-op wound infection. Treated w/ IV antibiotics and responded appropriately. Initially w/ wet-to-dry dressings. Transitioned to incisional WV, changed on AM of discharge. Discharged on PO antibiotics.     Otherwise, post-op course unremarkable. At time of discharge, pain well controlled, tolerating regular diet, ostomy functioning appropriately, voiding, ambulating.     Patient has f/u appointment in wound care clinic in 3d. F/u in gen surg clinic in 1-2 weeks.     Alexis Scheuermann, MD   [facility], PGY3  05/27/2022 11:16 AM

## 2022-05-27 NOTE — MEDICAL/APP STUDENT
[facility] - Purple Team  Daily Progress Note      Subjective:  NAEON. Patient notes similar to levels of pain. Overnight he had looser output from colostomy. He denies n/v and is tolerating his diet. He had continued sufficient urine output. He notes ambulating. Patient is motivated for discharge.    Objective:    Vitals:  Vitals:    05/27/22 0339   BP: (!) 142/82   Pulse: 70   Resp:    Temp: 97.6 °F (36.4 °C)        Intake/Output:  Urine output: 2,850 ml (1.4 ml/kg/hr)    Physical Exam:  Gen: NAD  Neuro: awake, alert, answering questions appropriately  Resp: non-labored breathing  Abd: soft, ND, NT, Colostomy bag in place with light brown, thick liquid output. Wound vax in place. No erythema or warmth.   Ext: moves all 4 spontaneously and purposefully      Labs:  No new labs    Imaging:  No new imaging    Micro/Path/Other:  No new pathology    Assessment/Plan:  55 yo male POD 9 ex lap with anterior rectotomy and diverting loop colostomy creation complicated by post operative infection around exlap site. Staple removed on 5/23 and wound vax in place.    - Awaiting medicaid approval for home wound vac  - Due to time requirements for medicaid approval, will educate patient on wet to dry dressing changes until wound vac approval in preparation for discharge  - Continue to measure colostomy out   - Wound care on board for ostomy     Doris Swain   [facility] General Surgery, MS3  05/27/2022 5:42 AM

## 2022-05-27 NOTE — NURSING
Spoke with surgeon, DR. SCHEUERMANN concerning patient med rec. Unable to see prescribed meds on patient discharge avs, patient has meds that were delivered meds to bed. meds prescribed were bactrim and oxycodone prn. Patient aware to take all meds as prescribed. PATIENT EAGER TO GET HOME DOES NOT WANT TO WAIT UNTIL ISSUE IS RESOLVED

## 2022-05-27 NOTE — PROGRESS NOTES
[facility] General Surgery - Purple Team  Daily Progress Note      Subjective:  NAEON. Patient notes similar to levels of pain. Overnight he had looser output from colostomy. He denies n/v and is tolerating his diet. He had continued sufficient urine output. He notes ambulating. Patient is motivated for discharge.    Objective:    Vitals:  Vitals:    05/27/22 0623   BP:    Pulse:    Resp: 20   Temp:         Intake/Output:  Urine output: 2,850 ml (1.4 ml/kg/hr)    Physical Exam:  Gen: NAD  Neuro: awake, alert, answering questions appropriately  Resp: non-labored breathing  Abd: soft, ND, NT, Colostomy bag in place with light brown, thick liquid output. Wound vax in place. No erythema or warmth.   Ext: moves all 4 spontaneously and purposefully      Labs:  No new labs    Imaging:  No new imaging    Micro/Path/Other:  No new pathology    Assessment/Plan:  55 yo male POD 9 ex lap with anterior rectotomy and diverting loop colostomy creation complicated by post operative infection around exlap site. Staple removed on 5/23 and wound vax in place.    - Awaiting medicaid approval for home wound vac  - Due to time requirements for medicaid approval, will educate patient on wet to dry dressing changes until wound vac approval in preparation for discharge  - Continue to measure colostomy out   - Wound care on board for ostomy     Doris Jeanjose  [facility] General Surgery, MS3  05/27/2022 5:42 AM         PGY3 Addendum:  I examined the patient w/ MS3 Doris Swain and agree w/ her documentation above. My brief assessment follows:    POD9 s/p ex-lap w/ rectal FB removal, diverting loop colostomy. Post-op course c/b wound infection.     Doing well this AM. AF, HDS. Pain controlled. Tolerating diet. Ostomy functioning appropriately.     Midline incision w/ WV. Minimal serosang output. Cellulitis significantly improved. Medicaid application pending. Will likely not be able to go home w/ home WV. Will talk to wound care RN and plan to  change back to wet-to-dry dressings today. Patient reports he thinks he will be comfortable changing dressing at home.     Anticipate discharge home by this afternoon. Will need f/u in gen surg clinic in 1 week and f/u in wound care clinic 2x weekly.     Alexis Scheuermann, MD   [facility] General Surgery, PGY3  05/27/2022 6:35 AM

## 2022-05-27 NOTE — PROGRESS NOTES
Patient is seen at bedside to follow up on LLQ ostomy and midline wound. Surgery med student at bedside. Pt to discharge home today. Ostomy appliance and wound vac dressing removed. Ostomy stoma remains red, moist and protruding. Red rubber catheter sites noted with some healing, they are packed with collagen and isolated under the ostomy appliance with a good seal noted. A barrier ring is applied between ostomy and midline wound to attempt to prevent stool contamination. Midline abdomen noted with increased red granulation tissue and some wound contraction. Wound measured 7x3.5x3.5cm. Wound is free from purulence or erythema. Wound cleaned well and black foam wound vac dressing applied with a good seal. Pt educated at length how to manage ostomy and home wound vac. Understanding noted however it is difficult for pts to visualize abdomen. Pt to follow up in wound care clinic on Monday. Pt is ready for discharge from a wound care standpoint. Surgery notified.

## 2022-05-27 NOTE — PROGRESS NOTES
[facility] - [address] [place] [address]  Adult Nutrition  Progress Note    SUMMARY       Recommendations    Recommendation/Intervention: 1. continue regular diet with boost breeze tid for added protein 2. MVI/fe 3. Biweekly wt ; will monitor nutrition status. Pt being discharge home today.  Goals: meet greater than 75% nutrition needs  Nutrition Goal Status: goal met  Communication of RD Recs: reviewed with RN    Assessment and Plan    Pt tolerating regular diet and oral supplement; po intake adequate to maintain nutrition status. Pt has wound vac for wound infection. Pt being discharged home today.   Nutrition Problem  Inadequate Oral Intake    Related to (etiology):   Ostomy/surgery     Signs and Symptoms (as evidenced by):   Hx decreased oral intake/ NPO/CL liq status; (5-27) oral intake much improvedl *     Interventions(treatment strategy):  General / Healthful Diet, Commercial beverage and Collaboration with other providers    Nutrition Diagnosis Status:   Resolved      Malnutrition Assessment  Malnutrition Type: other (see comments) (pt does not meet acute malnutrition criteria--for wt loss; fat/muscle wasting, edema.)          Energy Intake (Malnutrition): less than 75% for greater than 7 days                         Reason for Assessment    Reason For Assessment: RD follow-up  Diagnosis: other (see comments) (rectal foreign body; (5-18) S/P Exp Lap--diverting loop colostomy)  Relevant Medical History: none  General Information Comments: Pt diet progressed to regular; tolerating well; good appetite; eating 75% or greater of meals; no N/V; drinking boost breeze. Pt with S/P staple removal for wound infection; + wound vac (5-23). NO new wt. Possible discharge home today. Current diet tx appropriate.    Nutrition Risk Screen    Nutrition Risk Screen: other (see comments) (wound infection/colostomy)    Nutrition/Diet History    Patient Reported Diet/Restrictions/Preferences: general  Typical Food/Fluid Intake:  "meals/ snacks  Spiritual, Cultural Beliefs, [employer] Practices, Values that Affect Care: no  Food Allergies: NKFA  Factors Affecting Nutritional Intake: altered gastrointestinal function    Anthropometrics    Temp: 97.7 °F (36.5 °C)  Height Method: Stated  Height: 6' 2" (188 cm)  Height (inches): 74 in  Weight Method: Standard Scale  Weight: 83.9 kg (185 lb)  Weight (lb): 185 lb  Ideal Body Weight (IBW), Male: 190 lb  % Ideal Body Weight, Male (lb): 97.37 %  BMI (Calculated): 23.7  BMI Grade: 18.5-24.9 - normal  Usual Body Weight (UBW), k.9 kg  % Usual Body Weight: 100.23       Lab/Procedures/Meds    Pertinent Labs Reviewed: reviewed  Pertinent Labs Comments: (5-25) H/H 13.0/39.4 (5-24) GLuc 121 Bun 10.4 Cr 1.0 Alk Phos 366(H) Alb 2.6(L)  Pertinent Medications Reviewed: reviewed  Pertinent Medications Comments: acetaminophen, zosyn,      Estimated/Assessed Needs    Weight Used For Calorie Calculations: 83.9 kg (184 lb 15.5 oz)  Energy Calorie Requirements (kcal): 7 kcal/d; 25 kcal/kg  Energy Need Method: Kcal/kg  Protein Requirements: 100 gm protein/d; 1.2 gm/kg  Weight Used For Protein Calculations: 83.9 kg (184 lb 15.5 oz)     Estimated Fluid Requirement Method: RDA Method  RDA Method (mL):          Nutrition Prescription Ordered    Current Diet Order: regular diet/ boost breeze tid  Nutrition Order Comments: Pt eating well; 75% meals/ supplement    Evaluation of Received Nutrient/Fluid Intake    Energy Calories Required: meeting needs  Protein Required: meeting needs  Fluid Required: meeting needs  Comments: Pt tolerating regular diet and boost breeze; 75% or greater; no new wt. Current diet tx appropriate.  Tolerance: tolerating  % Intake of Estimated Energy Needs: 75 - 100 %  % Meal Intake: 75 - 100 %    Nutrition Risk    Level of Risk/Frequency of Follow-up: moderate     Monitor and Evaluation    Food and Nutrient Intake: energy intake  Food and Nutrient Adminstration: diet order  Anthropometric " Measurements: weight     Nutrition Follow-Up    RD Follow-up?: Yes

## 2022-05-27 NOTE — PT/OT/SLP DISCHARGE
Physical Therapy Discharge Summary    Name: Isaías Prasad  MRN: [id number]   Principal Problem: Rectal foreign body       POST DISCHARGE DOCUMENTATION      Patient Discharged from acute Physical Therapy on 2022.  Please refer to prior PT noted date on 2022 for functional status.      Discharge Recommendations:  LTAC vs. home w/ responsible caregiver w/ HH w/ wound care   Discharge Equipment Recommendations: cane, straight   Barriers to discharge: None        Assessment:     Patient was discharged unexpectedly.  Information required to complete an accurate discharge summary is unknown.  Refer to therapy initial evaluation and last progress note for initial and most recent functional status and goal achievement.  Recommendations made may be found in medical record. Goals partially met.     -continued OOB and up-to-chair and ambulation w/ and w/o rolling walker w/ progression of activities as tolerated/appropriate (as ordered by MWINSTON)       Objective:     GOALS:   Multidisciplinary Problems     Physical Therapy Goals - 5 out of 6 STG's met by pt during this hospital admit       Problem: Physical Therapy    Goal Priority Disciplines Outcome Goal Variances Interventions   Physical Therapy Goal     PT, PT/OT Ongoing, Progressing     Description: Goals to be met by: discharge       Patient will increase functional independence with mobility by performin. Supine to sit with Modified [place] - met 2022    2. Sit to supine with Modified [place]  - met 2022    3. Rolling to Left and Right with Modified [place]  - met 2022    4. Sit to stand transfer with Modified [place] - met 2022    5. Gait  x 260 feet with  Supervision using Rolling Walker - met 2022    6. Ascend/descend 3 stair with right Handrails Minimal Assistance using No Assistive Device-NOT MET                     Reasons for Discontinuation of Therapy Services  hospital disharge      Plan:      Patient Discharged to: home or self care (see PT Recommendations).      5/27/2022

## 2022-05-27 NOTE — PLAN OF CARE
DISCHARGE ORDERS,PATIENT HAS SHOWN IMPROVEMENT WITH PARTICIPATING IN CARE OF COLOSTOMY. WOUND CARE SET UP OUTPATIENT. PAIN CONTROL WITH PRN PAIN MEDS UNTIL FOLLOW UP WITH SURGERY TEAM

## 2022-05-27 NOTE — PT/OT/SLP PROGRESS
Physical Therapy  Missed Treatment Note  Cancel Note      Patient Name:  Isaías Prasad   MRN:  [id number]    Patient not seen today secondary to Other (Comment) (pt eating breakfast in bed, therapist offered to assist pt to bedside chair to continue w/ breakfast, pt declined, pt's nurse Desire notified). Will follow-up as patient is available .

## 2022-05-27 NOTE — MEDICAL/APP STUDENT
"[facility] General Surgery  Discharge Summary    Admit Date: 5/17/2022  Discharge Date: 5/27/2022  Admitting Physician: Chauncey Jimenez Jr., *  Consulting Physicians(s): None     Admission HPI:   57 yo male who presents due to foreign body in rectum. He notes two days ago that he "slipped and fell off the roof and landed on an object which may have glass". He waited until today to present as he was hoping it would pass on its own. He notes last PO intake of water a few hours earlier.    Hospital Course:   Foreign body removal was attempted and unsuccessful at bedside. Patient brought to OR for attempted removal under anesthesia which was unsuccessful for removal through anus. Removal required rectotomy with creation of diverting loop colostomy on 5/19/22. Patient hospital course was complicated by wound infection which required staple removal on 5/23/22 and antibiotics (Zosyn). Wound vac placed and erythema and drainage improved. Patient received training on wound and ostomy care. Patient was advanced to regular diet and tolerated well. Pt required stool softeners to improve ostomy output. Patient able to ambulate prior to discharge. Over course of hospital stay patient remained afebrile.     Procedures Performed:     5/18/2022:     1. Exam under anesthesia  2. Exploratory laparotomy  3. Foreign body removal through anterior rectotomy  4. Diverting loop colostomy creation    Final Diagnoses: There are no hospital problems to display for this patient.      Discharge Condition: stable    Disposition: home     Follow-Up Plan: wound care- 5/30/22    Discharge Instructions:   Activity: no heavy lifting (aobve 15lbs), other activity as tolerated  Diet: regular   Wound Care: wound vac in place, follow-up outpatient with wound care     Discharge Medications:  There are no discharge medications for this patient.      Doris Swain   [facility] General Surgery MS3  05/27/2022 9:13 AM    "

## 2022-05-27 NOTE — MEDICAL/APP STUDENT
"[facility] General Surgery  Discharge Summary    Admit Date: 5/17/2022  Discharge Date: 5/27/2022  Admitting Physician: Chauncey Jimenez Jr., *  Consulting Physicians(s): None     Admission HPI:   57 yo male who presents due to foreign body in rectum. He notes two days ago that he "slipped and fell off the roof and landed on an object which may have glass". He waited until today to present as he was hoping it would pass on its own. He notes last PO intake of water a few hours earlier.    Hospital Course:   Foreign body removal was attempted and unsuccessful at bedside. Patient brought to OR for attempted removal under anesthesia which was unsuccessful for removal through anus. Removal required rectotomy with creation of diverting loop colostomy on 5/19/22. Patient hospital course was complicated by wound infection which required staple removal on 5/23/22 and antibiotics (Zosyn). Wound vac placed and erythema and drainage improved. Patient received training on wound and ostomy care. Patient was advanced to regular diet and tolerated well. Pt required stool softeners to improve ostomy output. Patient able to ambulate prior to discharge. Over course of hospital stay patient remained afebrile.     Procedures Performed:     5/18/2022:     1. Exam under anesthesia  2. Exploratory laparotomy  3. Foreign body removal through anterior rectotomy  4. Diverting loop colostomy creation    Final Diagnoses:   Active Hospital Problems    Diagnosis  POA    *Rectal foreign body [T18.5XXA]  Unknown      Resolved Hospital Problems   No resolved problems to display.       Discharge Condition: stable    Disposition: home     Follow-Up Plan: wound care- 5/30/22    Discharge Instructions:   Activity: no heavy lifting (aobve 15lbs), other activity as tolerated  Diet: regular   Wound Care: wound vac in place, follow-up outpatient with wound care     Discharge Medications:  Bettina 5 mg x15 tabs  Bactrim x7d  MiraLax qD    Doris Swain  [facility] " General Surgery MS3  05/27/2022 9:13 AM      PGY3 Addendum:  I examined the patient w/ MS3 Doris Swain and agree w/ her documentation above. My brief assessment follows:    Patient p/w rectal foreign body. Unable to be removed per rectum at bedside in ED or in OR. Ultimately required ex-lap w/ removal via anterior rectotomy. Diverted w/ loop ostomy.     Developed post-op wound infection. Treated w/ IV antibiotics and responded appropriately. Initially w/ wet-to-dry dressings. Transitioned to incisional WV, changed on AM of discharge. Discharged on PO antibiotics.     Otherwise, post-op course unremarkable. At time of discharge, pain well controlled, tolerating regular diet, ostomy functioning appropriately, voiding, ambulating.     Patient has f/u appointment in wound care clinic in 3d. F/u in gen surg clinic in 1-2 weeks.     Alexis Scheuermann, MD   [facility] General Surgery, PGY3  05/27/2022 11:16 AM

## 2022-05-27 NOTE — PROGRESS NOTES
[facility] filled bactrim and oxycodone and delivered to bedside. Miralax is OTC.

## 2022-05-28 ENCOUNTER — HOSPITAL ENCOUNTER (EMERGENCY)
Facility: HOSPITAL | Age: 57
Discharge: HOME | End: 2022-05-28
Attending: STUDENT IN AN ORGANIZED HEALTH CARE EDUCATION/TRAINING PROGRAM
Payer: MEDICAID

## 2022-05-28 VITALS
BODY MASS INDEX: 24.34 KG/M2 | DIASTOLIC BLOOD PRESSURE: 87 MMHG | WEIGHT: 189.63 LBS | HEIGHT: 74 IN | SYSTOLIC BLOOD PRESSURE: 168 MMHG | TEMPERATURE: 98 F | RESPIRATION RATE: 16 BRPM | HEART RATE: 78 BPM | OXYGEN SATURATION: 98 %

## 2022-05-28 DIAGNOSIS — Z46.89 ENCOUNTER FOR MANAGEMENT OF VACUUM-ASSISTED CLOSURE (VAC) OF WOUND: Primary | ICD-10-CM

## 2022-05-28 PROCEDURE — 99281 EMR DPT VST MAYX REQ PHY/QHP: CPT

## 2022-05-28 NOTE — ED PROVIDER NOTES
Encounter Date: 5/28/2022       History     Chief Complaint   Patient presents with    colostomy leaking     D/c home yesterday with colostomy and cant get it to seal      56-year-old male presents to ED for wound VAC leakage.  states he was in the hospital for 3 days, discharged yesterday.  states the wound vac device has been alarming for about 1 day stating that his wound VAC does not have a proper seal and has an air leak.  While hospitalized he had a colostomy placed, had an infection and ultimately a wound VAC.  states the colostomy is well sealed and functioning properly.  no discharge or drainage from the wound VAC however the machine is alarming.  denies any new or worsening abdominal pains.  No fevers.  No traumas.  Normal urination.  No flank pains.  No skin changes.  No other complaints at this time.        Review of patient's allergies indicates:  No Known Allergies  Past Medical History:   Diagnosis Date    Disruption of external operation (surgical) wound, not elsewhere classified, initial encounter 5/30/2022    Open abdominal wall wound 5/30/2022    Status post colostomy 5/30/2022    Tobacco user 5/26/2022    Transient ischemic attack 5/26/2022     Past Surgical History:   Procedure Laterality Date    COLOSTOMY      EXAMINATION UNDER ANESTHESIA N/A 5/18/2022    Procedure: Exam under anesthesia, removal of rectal foreign body;  Surgeon: Chauncey Jimenez Jr., MD;  Location: [facility];  Service: General;  Laterality: N/A;  David/Scheuermann, possible ex lap     No family history on file.  Social History     Tobacco Use    Smoking status: Current Every Day Smoker     Packs/day: 0.50     Types: Cigarettes    Smokeless tobacco: Never Used   Substance Use Topics    Alcohol use: Not Currently    Drug use: Not Currently     Review of Systems   Constitutional: Negative for chills and fever.   HENT: Negative for congestion, rhinorrhea and sore throat.    Eyes: Negative for pain, discharge and  itching.   Respiratory: Negative for chest tightness and shortness of breath.    Cardiovascular: Negative for chest pain and palpitations.   Gastrointestinal: Negative for abdominal distention, abdominal pain, constipation, diarrhea, nausea, rectal pain and vomiting.        Abdominal wound vac alarming from air leak   Genitourinary: Negative for dysuria and hematuria.   Musculoskeletal: Negative for myalgias and neck pain.   Skin: Negative for color change and rash.   Neurological: Negative for dizziness, weakness and headaches.   Psychiatric/Behavioral: Negative for confusion. The patient is not hyperactive.        Physical Exam     Initial Vitals [05/28/22 1255]   BP Pulse Resp Temp SpO2   (!) 171/97 96 18 98.1 °F (36.7 °C) 96 %      MAP       --         Physical Exam    Constitutional: He appears well-developed and well-nourished. He is not diaphoretic. No distress.   HENT:   Head: Normocephalic and atraumatic.   Eyes: Conjunctivae and EOM are normal. Pupils are equal, round, and reactive to light.   Neck: Neck supple. No tracheal deviation present.   Normal range of motion.  Cardiovascular: Normal rate, regular rhythm and normal heart sounds.   Pulmonary/Chest: Breath sounds normal. No respiratory distress.   Abdominal: Abdomen is soft. He exhibits no distension. There is no abdominal tenderness.   Colostomy in place, brown loose stool, appears to be functioning appropriately. No evidence of infection or discomfort on palpation. Lower abdominal wound vac in place, areas of poor seal. Nontender. No discharge/drainage. The pump is not attached. Bedside. There is no rebound and no guarding.   Musculoskeletal:         General: No tenderness. Normal range of motion.      Cervical back: Normal range of motion and neck supple.     Neurological: He is alert and oriented to person, place, and time. He has normal strength. GCS score is 15. GCS eye subscore is 4. GCS verbal subscore is 5. GCS motor subscore is 6.   Skin:  Skin is warm and dry. Capillary refill takes less than 2 seconds. No rash noted.   Psychiatric: He has a normal mood and affect. His behavior is normal. Judgment and thought content normal.         ED Course   Procedures  Labs Reviewed - No data to display       Imaging Results    None          Medications - No data to display  Medical Decision Making:   ED Management:  Patient recently admitted to the hospital whoreceived colostomy and wound VAC presents for poor vac seal.  Denies any abdominal pain, no fevers, normal functioning colostomy and urinary status.  Spoke with surgical resident on-call that recommended removing the wound VAC entirely and place wet-to-dry dressing.  Already has close follow-up in clinic.  Patient understanding.  Walked nurse through order/  Strict return precautions provided and patient ultimately stable for discharge home.  No signs of infection or complications at this time. (nilay)             ED Course as of 06/12/22 2241   Sat May 28, 2022   1417 Spoke with surgical resident, discussed leaking wound vac. rec remove and place wet to dry. Has appointment on Monday. To keep. Stable for d/c. [RZ]      ED Course User Index  [RZ] Bharat Bagley MD             Clinical Impression:   Final diagnoses:  [Z46.89] Encounter for management of vacuum-assisted closure (VAC) of wound (Primary)          ED Disposition Condition    Discharge Stable        ED Prescriptions     None        Follow-up Information     Follow up With Specialties Details Why Contact Info    [facility] - Emergency Dept Emergency Medicine  As needed, If symptoms worsen [address] [facility] [address] [phone]    General Surgery  Go in 2 days keep appointment for Monday            Bharat Bagley MD  06/12/22 [id number]

## 2022-05-30 ENCOUNTER — HOSPITAL ENCOUNTER (OUTPATIENT)
Dept: WOUND CARE | Facility: HOSPITAL | Age: 57
Discharge: HOME | End: 2022-05-30
Attending: NURSE PRACTITIONER
Payer: MEDICAID

## 2022-05-30 VITALS
TEMPERATURE: 98 F | SYSTOLIC BLOOD PRESSURE: 148 MMHG | WEIGHT: 185 LBS | HEIGHT: 74 IN | BODY MASS INDEX: 23.74 KG/M2 | DIASTOLIC BLOOD PRESSURE: 83 MMHG | HEART RATE: 80 BPM

## 2022-05-30 DIAGNOSIS — Z72.0 TOBACCO USER: ICD-10-CM

## 2022-05-30 DIAGNOSIS — T81.31XA DISRUPTION OF EXTERNAL OPERATION (SURGICAL) WOUND, NOT ELSEWHERE CLASSIFIED, INITIAL ENCOUNTER: ICD-10-CM

## 2022-05-30 DIAGNOSIS — S31.109A OPEN WOUND OF ABDOMINAL WALL, INITIAL ENCOUNTER: Primary | ICD-10-CM

## 2022-05-30 DIAGNOSIS — Z93.3 STATUS POST COLOSTOMY: ICD-10-CM

## 2022-05-30 PROCEDURE — 97605 NEG PRS WND THER DME<=50SQCM: CPT

## 2022-05-30 PROCEDURE — 99204 PR OFFICE/OUTPT VISIT, NEW, LEVL IV, 45-59 MIN: ICD-10-PCS | Mod: 25,,, | Performed by: NURSE PRACTITIONER

## 2022-05-30 PROCEDURE — 97607 NEG PRS WND THR NDME<=50SQCM: CPT | Mod: ,,, | Performed by: NURSE PRACTITIONER

## 2022-05-30 PROCEDURE — 99204 OFFICE O/P NEW MOD 45 MIN: CPT | Mod: 25,,, | Performed by: NURSE PRACTITIONER

## 2022-05-30 PROCEDURE — 97607 PR NEG PRESS WOUND THERAPY (NPWT) W/DISPOSABLE WOUND VAC DEVICE, <=50 CM: ICD-10-PCS | Mod: ,,, | Performed by: NURSE PRACTITIONER

## 2022-05-30 RX ORDER — SULFAMETHOXAZOLE AND TRIMETHOPRIM 800; 160 MG/1; MG/1
1 TABLET ORAL 2 TIMES DAILY
COMMUNITY
Start: 2022-05-27 | End: 2022-10-14 | Stop reason: CLARIF

## 2022-05-30 RX ORDER — OXYCODONE HYDROCHLORIDE 5 MG/1
5 TABLET ORAL EVERY 6 HOURS PRN
COMMUNITY
Start: 2022-05-27 | End: 2022-10-14 | Stop reason: CLARIF

## 2022-05-30 NOTE — PATIENT INSTRUCTIONS
2-hour rule:  If wound vac stops working, for any reason, remove black foam and replace with wet-to-dry gauze.      Wound vac dressing and ostomy flange and pouch will be changed in wound clinic on a Monday/Thursday schedule.

## 2022-05-30 NOTE — PROGRESS NOTES
CHIEF COMPLAINT:  Surgical wound to abdomen.    New colostomy.       HISTORY OF  PRESENT ILLNESS:  56 y.o. White male being seen today for evaluation and management of surgical wound to abdomen and new colostomy.  Hospitalized at [facility] 5/17/2022 - 5/27/2022 after presenting to ER with c/o falling off ladder and landing on a glass object two days prior.  Attempts at removing foreign object through anus unsuccessful at bedside and in OR.  Underwent rectotomy with diverting loop colostomy on 5/19/2022.  Developed post-op wound infection, which required IV Zosyn.  Discharged home with 7-day course of Bactrim.  Single in M/F relationship.  Three grown children.  Employed as .  History includes:        Past Medical History:   Diagnosis Date    Disruption of external operation (surgical) wound, not elsewhere classified, initial encounter 5/30/2022    Open abdominal wall wound 5/30/2022    Status post colostomy 5/30/2022    Tobacco user 5/26/2022    Transient ischemic attack 5/26/2022      Past Surgical History:   Procedure Laterality Date    COLOSTOMY      EXAMINATION UNDER ANESTHESIA N/A 05/18/2022    Procedure: Exam under anesthesia, removal of rectal foreign body;  Surgeon: Chauncey Jimenez Jr., MD;  Location: [facility];  Service: General;  Laterality: N/A;  David/Scheuermann, possible ex lap      Social History     Socioeconomic History    Marital status: Significant Other   Tobacco Use    Smoking status: Current Every Day Smoker     Packs/day: 0.50     Types: Cigarettes    Smokeless tobacco: Never Used   Substance and Sexual Activity    Alcohol use: Not Currently    Drug use: Not Currently        Review of Most Recent Wound Care-Related Labs:  Lab Results   Component Value Date/Time    WBC 6.9 05/25/2022 07:05 AM    RBC 4.24 (L) 05/25/2022 07:05 AM    HGB 13.0 (L) 05/25/2022 07:05 AM    HCT 39.4 (L) 05/25/2022 07:05 AM    MCV 92.9 05/25/2022 07:05 AM    MCH 30.7 05/25/2022 07:05 AM    CREATININE  1.05 05/24/2022 03:51 AM    HGBA1C 5.7 10/25/2019 01:43 AM            Today 5/30/202:  Had to come to ER yesterday after leaving house to go somewhere, when wound vac battery went dead.  ER stabilized wound vac; however, he and girlfriend had to remove wound vac and ostomy flange/pouch yesterday.  Stools have thinned down with Miralax; no problems with appetite, stooling, or n/v.  Taking Bactrim without new s/e.  Denies fevers, chills, wound redness, wound pain, wound odors, or yellow/green drainage.  Girlfriend staying with him and helping with recuperation, ostomy, and wound vac monitoring.         REVIEW OF SYSTEMS:  Except as stated in HPI, all other 10 body systems normal.      PHYSICAL EXAMINATION AND VITAL SIGNS:    Vitals:    05/30/22 1029   BP: (!) 148/83   Pulse: 80   Temp: 97.9 °F (36.6 °C)    Body mass index is 23.75 kg/m².    General:  Hypertensive, asymptomatic with, afebrile.  Thin, tall gentleman, in no acute distress.  Girlfriend, Aditi, with him.   Respiratory: breathing even, quiet, and unlabored.  No coughing.  Gastrointestinal:  Rounded, soft, and mildly tender abdomen (around abdominal incision).   Midline vertical full-thickness surgical wound to abdomen.  LLQ ostomy (see below).   Musculoskeletal:  full range of motion of all extremities; no joint deformities or edema.   Neurologic:  A&O X 3.  Cranial nerves grossly intact.    Psychiatric:  Calm, cooperative.  Mood and effect normal.  Responses appropriate.   Integumentary:    Full-thickness vertical surgical wound to abdomen.  95% beefy red granulating tissue, with 5% thin thready fibrinous slough laterally.  No erythema, purulent drainage, periwound edema, odors, induration, bogginess, or fluctuance.     Stoma Assessment:    Mucosa:  Texture: Rubbery/Moist  Color: Red  Mucocutaneous junction: Visible today. Stoma protrudes above peristomal skin today    Amount of Protrusion:  Centimeters above skin: Approximately 1.0 cm around entire  "stoma    Anatomic Location:  Diverting loop colostomy    Size and Shape:   Round.  Measures: 4.0 cm x 4.5 cm x 1.0 cm    Location of Stoma Lumen:  Straight up 1:00 position    Stoma Function: Empties pouch once to twice a day of soft brown stool    Peristomal skin integrity: Healthy, intact.    Peristomal plane characteristics:  Mild rounding of abdomen proximal to   Stoma.  No crevices or creases in supine position.     Patient's preferences and requirements: Girlfriend, Aditi, doing ostomy care    Ostomy  Products:  [place] flat 2 and 3/4" flange/pouch.  ## [id number] and [id number].    One barrier ring.                            ASSESSMENT AND PLAN:    S/P removal of foreign object from anus, via rectotomy.   Surgery done 5/19/2022.      Surgical wound to mid-abdomen:  Responding well to NPWT.  No s/s of localized infection.  Taking, and tolerating, Bactrim without s/e.    Indigent vac, while awaiting Medicaid coverage.  No home health.   Wound Care Today/Continued wound care:  Clean wound with Dakins 0.25%, followed by windowing of periwound margins per protocol.  One piece black granufoam inserted into wound bed, followed by wound vac drape.  Settings:  125 mmHg continuous pressure, low intensity.      S/P Diverting Loop Colostomy:  Healthy protruding stoma.    Ostomy Care/Teaching Today:  Deandra performed teaching/care.  Peristoma cleaned with water and patted dry.  Skin prep applied to peristomal skin.  One barrier ring applied.  [place] flat flange #[id number] applied, followed by velcro drainable pouch #[id number].   Instructed on foods to avoid that may cause flatulence, with rationale.      Smoking:  This has been identified as a co-factor towards impaired wound healing.   He has cut back significantly.  He is smoking between three and five cigarettes/day.  Positive reinforcement provided, with teaching on role smoking plays in wound healing process, as well as overall vascular and organ health.              RTC on " Thursday.  We will see him on a Monday/Thursday schedule for wound vac and ostomy care.  Teaching provided on s/s of wound deterioration to call wound clinic for.

## 2022-06-01 ENCOUNTER — TELEPHONE (OUTPATIENT)
Dept: SURGERY | Facility: CLINIC | Age: 57
End: 2022-06-01
Payer: MEDICAID

## 2022-06-01 NOTE — TELEPHONE ENCOUNTER
Patient's girlfriend, Lisa, called to ask for pain medication for abdominal pain that he is having since his surgery on 5/18/22.  She said he does not have fever and said she was unable to ask patient for 0-10 pain level, because he was resting.  When asked what he was taking for pain she said Oxycodone and Ibuprofen.  I then told her that this was a very strong pain reliever and to continue taking this.  Instructed her to bring him to ED if he develops temp of 101 or higher, or if they felt it necessary.  Also instructed them to follow up with wound care on tomorrows appointment.  Notified Dr. Scheuermann and Dr. Jimenez of above to which they were in agreement.

## 2022-06-02 ENCOUNTER — HOSPITAL ENCOUNTER (OUTPATIENT)
Dept: WOUND CARE | Facility: HOSPITAL | Age: 57
Discharge: HOME | End: 2022-06-02
Attending: NURSE PRACTITIONER
Payer: MEDICAID

## 2022-06-02 VITALS
HEART RATE: 80 BPM | TEMPERATURE: 98 F | SYSTOLIC BLOOD PRESSURE: 128 MMHG | RESPIRATION RATE: 18 BRPM | DIASTOLIC BLOOD PRESSURE: 73 MMHG

## 2022-06-02 PROCEDURE — 97605 NEG PRS WND THER DME<=50SQCM: CPT

## 2022-06-02 RX ORDER — POLYETHYLENE GLYCOL 3350 17 G/17G
POWDER, FOR SOLUTION ORAL
COMMUNITY
End: 2022-10-14 | Stop reason: CLARIF

## 2022-06-02 NOTE — PATIENT INSTRUCTIONS
If wound vac is inoperable for greater than 2 hours remove dressing and apply wet to dry dressing.  Infection precautions

## 2022-06-06 ENCOUNTER — HOSPITAL ENCOUNTER (OUTPATIENT)
Dept: WOUND CARE | Facility: HOSPITAL | Age: 57
Discharge: HOME | End: 2022-06-06
Attending: NURSE PRACTITIONER
Payer: MEDICAID

## 2022-06-06 VITALS
TEMPERATURE: 99 F | HEART RATE: 87 BPM | SYSTOLIC BLOOD PRESSURE: 143 MMHG | RESPIRATION RATE: 18 BRPM | DIASTOLIC BLOOD PRESSURE: 80 MMHG

## 2022-06-06 DIAGNOSIS — Z72.0 TOBACCO USER: ICD-10-CM

## 2022-06-06 DIAGNOSIS — S31.109D OPEN WOUND ANTERIOR ABDOMINAL WALL, SUBSEQUENT ENCOUNTER: Primary | ICD-10-CM

## 2022-06-06 DIAGNOSIS — T81.31XD SURGICAL WOUND DEHISCENCE, SUBSEQUENT ENCOUNTER: ICD-10-CM

## 2022-06-06 DIAGNOSIS — Z93.3 STATUS POST COLOSTOMY: ICD-10-CM

## 2022-06-06 PROCEDURE — 99213 OFFICE O/P EST LOW 20 MIN: CPT | Mod: ,,, | Performed by: NURSE PRACTITIONER

## 2022-06-06 PROCEDURE — 99213 PR OFFICE/OUTPT VISIT, EST, LEVL III, 20-29 MIN: ICD-10-PCS | Mod: ,,, | Performed by: NURSE PRACTITIONER

## 2022-06-06 PROCEDURE — 99211 OFF/OP EST MAY X REQ PHY/QHP: CPT

## 2022-06-06 NOTE — PATIENT INSTRUCTIONS
Cleanse with dakins. Apply hydrofera blue to wound bed and cover with dry gauze. Change dressing weekly and as needed for dressing to remain clean and dry.  Infection precautions

## 2022-06-06 NOTE — PROGRESS NOTES
CHIEF COMPLAINT:  Surgical wound to abdomen.    New colostomy.       HISTORY OF  PRESENT ILLNESS:  56 y.o. White male being seen today for follow-up of surgical wound to abdomen and new colostomy.  Hospitalized at [facility] 5/17/2022 - 5/27/2022 after presenting to ER with c/o falling off ladder and landing on a glass object two days prior.  Attempts at removing foreign object through anus unsuccessful at bedside and in OR.  Underwent rectotomy with diverting loop colostomy on 5/19/2022.  Glass insulator removed.  Developed post-op wound infection, which required IV Zosyn.  Discharged home with 7-day course of Bactrim.  Single in M/F relationship.  Three grown children.  Employed as .  Current wound care to abdominal surgical wound:  Cleaning with Dakins 0.25%, followed by NPWT at 125 mmHg continuous, low intensity.  NPWT discontinued 6/6/2022.  History includes:        Past Medical History:   Diagnosis Date    Disruption of external operation (surgical) wound, not elsewhere classified, initial encounter 5/30/2022    Open abdominal wall wound 5/30/2022    Status post colostomy 5/30/2022    Tobacco user 5/26/2022    Transient ischemic attack 5/26/2022      Past Surgical History:   Procedure Laterality Date    COLOSTOMY      EXAMINATION UNDER ANESTHESIA N/A 05/18/2022    Procedure: Exam under anesthesia, removal of rectal foreign body;  Surgeon: Chauncey Jimenez Jr., MD;  Location: [facility];  Service: General;  Laterality: N/A;  David/Scheuermann, possible ex lap      Social History     Socioeconomic History    Marital status: Significant Other   Tobacco Use    Smoking status: Current Every Day Smoker     Packs/day: 0.50     Types: Cigarettes    Smokeless tobacco: Never Used   Substance and Sexual Activity    Alcohol use: Not Currently    Drug use: Not Currently        Review of Most Recent Wound Care-Related Labs:  Lab Results   Component Value Date/Time    WBC 6.9 05/25/2022 07:05 AM    RBC 4.24 (L)  "05/25/2022 07:05 AM    HGB 13.0 (L) 05/25/2022 07:05 AM    HCT 39.4 (L) 05/25/2022 07:05 AM    MCV 92.9 05/25/2022 07:05 AM    MCH 30.7 05/25/2022 07:05 AM    CREATININE 1.05 05/24/2022 03:51 AM    HGBA1C 5.7 10/25/2019 01:43 AM          Today 6/6/2022:  Had to change colostomy pouching Saturday, and had to discontinue wound vac.  Pouching came loose and he had leaking.  He and SO relay that paint spattered across hands, legs, and feet was from painting a small project in his house.  SO states, "He didn't even get on a ladder."  Denies fevers, chills, wound odor, wound redness, wound pain, or yellow/green drainage.  No new rashes, lesions, or skin breakdown.     5/30/202:   Had to come to ER yesterday after leaving house to go somewhere, when wound vac battery went dead.  ER stabilized wound vac; however, he and girlfriend had to remove wound vac and ostomy flange/pouch yesterday.  Stools have thinned down with Miralax; no problems with appetite, stooling, or n/v.  Taking Bactrim without new s/e.  Denies fevers, chills, wound redness, wound pain, wound odors, or yellow/green drainage.  Girlfriend staying with him and helping with recuperation, ostomy, and wound vac monitoring.         REVIEW OF SYSTEMS:  Except as stated in HPI, all other 10 body systems normal.      PHYSICAL EXAMINATION AND VITAL SIGNS:    Vitals:    06/06/22 1341   BP: (!) 143/80   Pulse: 87   Resp: 18   Temp: 98.6 °F (37 °C)    There is no height or weight on file to calculate BMI.    General:  Hypertensive, asymptomatic with, afebrile.  Thin, tall gentleman, in no acute distress.  Girlfriend, Aditi, with him.  Arrived to clinic with plain gauze in abdominal wound, covered with wound vac drape.  Ostomy pouch leaking and not intact; stool over abdomen.    Respiratory: breathing even, quiet, and unlabored.  No coughing.  Gastrointestinal:  Rounded, soft, and mildly tender abdomen (around abdominal incision).   Midline vertical full-thickness " surgical wound to abdomen.  LLQ ostomy (see below).   Musculoskeletal:  full range of motion of all extremities; no joint deformities or edema.   Neurologic:  A&O X 3.  Cranial nerves grossly intact.    Psychiatric:  Calm, cooperative.  Mood and effect normal.  Responses appropriate.   Integumentary:    Full-thickness vertical surgical wound to abdomen.  60% beefy red granulating tissue, with 40% thin thready fibrinous slough at base of wound.  Wound dimensions smaller from last visit.  No erythema, purulent drainage, periwound edema, odors, induration, bogginess, or fluctuance.            Incision/Site 05/18/22 1556 Abdomen (Active)   05/18/22 1556   Present Prior to Hospital Arrival?:    Side:    Location: Abdomen   Orientation:    Incision Type:    Closure Method:    Additional Comments:    Removal Indication and Assessment:    Wound Outcome:    Removal Indications:    Yellow (%), Wound Tissue Color 40 % 06/06/22 1347   Wound Length (cm) 7.5 cm 06/06/22 1347   Wound Width (cm) 2.3 cm 06/06/22 1347   Wound Depth (cm) 2.3 cm 06/06/22 1347   Wound Volume (cm^3) 39.675 cm^3 06/06/22 1347   Wound Surface Area (cm^2) 17.25 cm^2 06/06/22 1347                   Stoma Assessment (5/30/2022):    Mucosa:  Texture: Rubbery/Moist  Color: Red  Mucocutaneous junction: Visible today. Stoma protrudes above peristomal skin today    Amount of Protrusion:  Centimeters above skin: Approximately 1.0 cm around entire stoma    Anatomic Location:  Diverting loop colostomy    Size and Shape:   Round.  Measures: 4.0 cm x 4.5 cm x 1.0 cm    Location of Stoma Lumen:  Straight up 1:00 position    Stoma Function: Empties pouch once to twice a day of soft brown stool    Peristomal skin integrity: Healthy, intact.    Peristomal plane characteristics:  Mild rounding of abdomen proximal to   Stoma.  No crevices or creases in supine position.     Patient's preferences and requirements: Girlfriend, Aditi, doing ostomy care    Ostomy   "Products:  [place] flat 2 and 3/4" flange/pouch.  ## [id number] and [id number].    One barrier ring.            ASSESSMENT AND PLAN:    S/P removal of foreign object (glass insulator) from anus, via rectotomy.   Surgery done 5/19/2022.      Surgical wound to mid-abdomen:  NPWT (indigent) discontinued 6/6/2022.    Mr. Prasad presented to wound clinic today, and prior visit, with wound vac off.  Having difficulty managing colostomy with wound vac.     Wound Care Today/New wound care treatment plan:  Clean wound with Dakins 0.25%, followed by Hydrofera Blue Ready in base of wound bed, followed by dry gauze up to surrounding skin, and secured with tape.  To be done twice/week, and as-needed, for colostomy leakage and soiling.     S/P Diverting Loop Colostomy:  Healthy protruding stoma.    Ostomy Care/Teaching Today:  Deandra reinforced teaching/care.  Peristoma cleaned with water and patted dry.  Skin prep applied to peristomal skin.  One barrier ring applied.  [place] flat flange #[id number] applied, followed by velcro drainable pouch #[id number].      Smoking:  This has been identified as a co-factor towards impaired wound healing.   He has cut back significantly.  He is smoking between three and five cigarettes/day.  Positive reinforcement provided, with teaching on role smoking plays in wound healing process, as well as overall vascular and organ health.   Complete cessation encouraged.             RTC in one week.  Teaching reinforced on s/s of wound deterioration to call wound clinic for.          "

## 2022-06-07 NOTE — ANESTHESIA POSTPROCEDURE EVALUATION
Anesthesia Post Evaluation    Patient: Isaías Prasad    Procedure(s) Performed: Procedure(s) (LRB):  Exam under anesthesia, removal of rectal foreign body (N/A)  LAPAROTOMY, EXPLORATORY (N/A)    Final Anesthesia Type: general      Patient location during evaluation: PACU  Level of consciousness: awake  Post-procedure vital signs: reviewed and stable  Airway patency: patent      Anesthetic complications: no      Cardiovascular status: hemodynamically stable  Respiratory status: spontaneous ventilation  Follow-up not needed.          Vitals Value Taken Time   /83 5/18/22 16:22   HR 90 5/18/22 16:22   RR 20 5/18/22 16:22   SpO2 100% 5/18/22 16:22   temp 36.8 5/18/22 16:22         Event Time   Out of Recovery 05/18/2022 16:37:00         Pain/Boston Score: No data recorded

## 2022-06-13 ENCOUNTER — HOSPITAL ENCOUNTER (OUTPATIENT)
Dept: WOUND CARE | Facility: HOSPITAL | Age: 57
Discharge: HOME | End: 2022-06-13
Attending: NURSE PRACTITIONER
Payer: MEDICAID

## 2022-06-13 VITALS
HEART RATE: 89 BPM | TEMPERATURE: 98 F | DIASTOLIC BLOOD PRESSURE: 82 MMHG | SYSTOLIC BLOOD PRESSURE: 159 MMHG | RESPIRATION RATE: 18 BRPM

## 2022-06-13 DIAGNOSIS — Z72.0 TOBACCO USER: ICD-10-CM

## 2022-06-13 DIAGNOSIS — S31.109D OPEN WOUND ANTERIOR ABDOMINAL WALL, SUBSEQUENT ENCOUNTER: Primary | ICD-10-CM

## 2022-06-13 DIAGNOSIS — Z93.3 STATUS POST COLOSTOMY: ICD-10-CM

## 2022-06-13 DIAGNOSIS — T81.31XD SURGICAL WOUND DEHISCENCE, SUBSEQUENT ENCOUNTER: ICD-10-CM

## 2022-06-13 PROCEDURE — 99211 OFF/OP EST MAY X REQ PHY/QHP: CPT

## 2022-06-13 PROCEDURE — 99213 OFFICE O/P EST LOW 20 MIN: CPT | Mod: ,,, | Performed by: NURSE PRACTITIONER

## 2022-06-13 PROCEDURE — 99213 PR OFFICE/OUTPT VISIT, EST, LEVL III, 20-29 MIN: ICD-10-PCS | Mod: ,,, | Performed by: NURSE PRACTITIONER

## 2022-06-13 NOTE — PATIENT INSTRUCTIONS
Ostomy education: protect zac stomal skin from stool with skin prep and barrier.  Infection precautions  Continue wound care with dakins and hydrofera blue every other day.

## 2022-06-13 NOTE — PROGRESS NOTES
CHIEF COMPLAINT:  Surgical wound to abdomen.    New colostomy.       HISTORY OF  PRESENT ILLNESS:  56 y.o. White male being seen today for follow-up of surgical wound to abdomen and new colostomy.  Hospitalized at [facility] 5/17/2022 - 5/27/2022 after presenting to ER with c/o falling off ladder and landing on a glass object two days prior.  Attempts at removing foreign object through anus unsuccessful at bedside and in OR.  Underwent rectotomy with diverting loop colostomy on 5/19/2022.  Glass insulator removed from colon.  Developed post-op wound infection, which required IV Zosyn.  Discharged home with 7-day course of Bactrim.  Single in M/F relationship.  Three grown children.  Employed as .  Current wound care to abdominal surgical wound:  Cleaning with Dakins 0.25%, followed by Hydrofera Blue Ready, followed by secondary dressing.  Being changed twice/week and PRN soiling.  NPWT discontinued 6/6/2022, due to seal issues with parallel ostomy.   History includes:        Past Medical History:   Diagnosis Date    Disruption of external operation (surgical) wound, not elsewhere classified, initial encounter 5/30/2022    Open abdominal wall wound 5/30/2022    Status post colostomy 5/30/2022    Tobacco user 5/26/2022    Transient ischemic attack 5/26/2022      Past Surgical History:   Procedure Laterality Date    COLOSTOMY      EXAMINATION UNDER ANESTHESIA N/A 5/18/2022    Procedure: Exam under anesthesia, removal of rectal foreign body;  Surgeon: Chauncey Jimenez Jr., MD;  Location: [facility];  Service: General;  Laterality: N/A;  David/Scheuermann, possible ex lap      Social History     Socioeconomic History    Marital status: Significant Other   Tobacco Use    Smoking status: Current Every Day Smoker     Packs/day: 0.50     Types: Cigarettes    Smokeless tobacco: Never Used   Substance and Sexual Activity    Alcohol use: Not Currently    Drug use: Not Currently        Review of Most Recent Wound  "Care-Related Labs:  Lab Results   Component Value Date/Time    WBC 6.9 05/25/2022 07:05 AM    RBC 4.24 (L) 05/25/2022 07:05 AM    HGB 13.0 (L) 05/25/2022 07:05 AM    HCT 39.4 (L) 05/25/2022 07:05 AM    MCV 92.9 05/25/2022 07:05 AM    MCH 30.7 05/25/2022 07:05 AM    CREATININE 1.05 05/24/2022 03:51 AM    HGBA1C 5.7 10/25/2019 01:43 AM          Today 6/13/2022:  Presented to wound clinic today with improperly applied flange (e.g., opening cut to edge of flange [place]).  Ostomy supplies being delivered tomorrow.  No reported complications with abdominal incision care or current treatment plan.  Has all wound care supplies in home.  Denies fevers, chills, wound odor, wound redness, wound pain, or yellow/green drainage.  No new rashes, lesions, or skin breakdown.     6/6/2022:  Had to change colostomy pouching Saturday, and had to discontinue wound vac.  Pouching came loose and he had leaking.  He and SO relay that paint spattered across hands, legs, and feet was from painting a small project in his house.  SO states, "He didn't even get on a ladder."  Denies fevers, chills, wound odor, wound redness, wound pain, or yellow/green drainage.  No new rashes, lesions, or skin breakdown.     5/30/202:   Had to come to ER yesterday after leaving house to go somewhere, when wound vac battery went dead.  ER stabilized wound vac; however, he and girlfriend had to remove wound vac and ostomy flange/pouch yesterday.  Stools have thinned down with Miralax; no problems with appetite, stooling, or n/v.  Taking Bactrim without new s/e.  Denies fevers, chills, wound redness, wound pain, wound odors, or yellow/green drainage.  Girlfriend staying with him and helping with recuperation, ostomy, and wound vac monitoring.         REVIEW OF SYSTEMS:  Except as stated in HPI, all other 10 body systems normal.      PHYSICAL EXAMINATION AND VITAL SIGNS:    Vitals:    06/13/22 1331   BP: (!) 159/82   Pulse: 89   Resp: 18   Temp: 98.4 °F (36.9 °C) "    There is no height or weight on file to calculate BMI.    General:  Hypertensive, asymptomatic with, afebrile.  Thin, tall gentleman, in no acute distress.  Girlfriend, Aditi, with him.    Respiratory: breathing even, quiet, and unlabored.  No coughing.  Gastrointestinal:  Rounded, soft, and mildly tender abdomen (around abdominal incision).   Midline vertical full-thickness surgical wound to abdomen.  LLQ ostomy (see below).   Musculoskeletal:  full range of motion of all extremities; no joint deformities or edema.   Neurologic:  A&O X 3.  Cranial nerves grossly intact.    Psychiatric:  Calm, cooperative.  Mood and effect normal.  Responses appropriate.   Integumentary:    Full-thickness vertical surgical wound to abdomen.  100% beefy red granulating tissue, with fascia at base of wound.  Wound dimensions smaller from last visit.  No erythema, purulent drainage, periwound edema, odors, induration, bogginess, or fluctuance.            Incision/Site 05/18/22 1556 Abdomen (Active)   05/18/22 1556   Present Prior to Hospital Arrival?:    Side:    Location: Abdomen   Orientation:    Incision Type:    Closure Method:    Additional Comments:    Removal Indication and Assessment:    Wound Outcome:    Removal Indications:    Wound Length (cm) 5.5 cm 06/13/22 1335   Wound Width (cm) 2 cm 06/13/22 1335   Wound Depth (cm) 2.5 cm 06/13/22 1335   Wound Volume (cm^3) 27.5 cm^3 06/13/22 1335   Wound Surface Area (cm^2) 11 cm^2 06/13/22 1335                       Stoma Assessment (5/30/2022):    Mucosa:  Texture: Rubbery/Moist  Color: Red  Mucocutaneous junction: Visible today. Stoma protrudes above peristomal skin today    Amount of Protrusion:  Centimeters above skin: Approximately 1.0 cm around entire stoma    Anatomic Location:  Diverting loop colostomy    Size and Shape:   Round.  Measures: 4.0 cm x 4.5 cm x 1.0 cm    Location of Stoma Lumen:  Straight up 1:00 position    Stoma Function: Empties pouch once to twice a day  "of soft brown stool    Peristomal skin integrity: Healthy, intact.    Peristomal plane characteristics:  Mild rounding of abdomen proximal to   Stoma.  No crevices or creases in supine position.     Patient's preferences and requirements: Girlfriend, Aditi, doing ostomy care    Ostomy  Products:  Zulay flat 2 and 3/4" flange/pouch.  ## [id number] and [id number].    One barrier ring.            ASSESSMENT AND PLAN:    S/P removal of foreign object (glass insulator) from anus, via rectotomy.   Surgery done 5/19/2022.      Surgical wound to mid-abdomen:  NPWT (indigent) discontinued 6/6/2022 due to seal issues with parallel ostomy.    Wound Care Today/Continued wound care treatment plan:  Clean wound with Dakins 0.25%, followed by Hydrofera Blue Ready in base of wound bed, followed by dry gauze up to surrounding skin, and secured with tape.  To be done twice/week, and as-needed, for colostomy leakage and soiling.     S/P Diverting Loop Colostomy:  Healthy protruding stoma.    Ostomy Care/Teaching Today:  nurse Juan, reinforced teaching/care, including instructions on proper sizing/cutting of flange opening, with rationale.  Peristoma cleaned with water and patted dry.  Skin prep applied to peristomal skin.  One barrier ring applied.  Continental flat flange #[id number] applied, followed by velcro drainable pouch #[id number].      Smoking:  This has been identified as a co-factor towards impaired wound healing.   He has cut back significantly.  He is smoking between three and five cigarettes/day.  Positive reinforcement provided, with teaching on role smoking plays in wound healing process, as well as overall vascular and organ health.   Complete cessation encouraged.             RTC in one week; high risk for deterioration due to knowledge deficits.  Teaching reinforced on s/s of wound deterioration to call wound clinic for.          "

## 2022-06-20 ENCOUNTER — HOSPITAL ENCOUNTER (OUTPATIENT)
Dept: WOUND CARE | Facility: HOSPITAL | Age: 57
Discharge: HOME | End: 2022-06-20
Attending: NURSE PRACTITIONER
Payer: MEDICAID

## 2022-06-20 VITALS
DIASTOLIC BLOOD PRESSURE: 78 MMHG | SYSTOLIC BLOOD PRESSURE: 156 MMHG | RESPIRATION RATE: 20 BRPM | HEART RATE: 90 BPM | TEMPERATURE: 99 F

## 2022-06-20 DIAGNOSIS — Z93.3 STATUS POST COLOSTOMY: ICD-10-CM

## 2022-06-20 DIAGNOSIS — Z72.0 TOBACCO USER: ICD-10-CM

## 2022-06-20 DIAGNOSIS — T81.31XD SURGICAL WOUND DEHISCENCE, SUBSEQUENT ENCOUNTER: ICD-10-CM

## 2022-06-20 DIAGNOSIS — S31.109D OPEN WOUND ANTERIOR ABDOMINAL WALL, SUBSEQUENT ENCOUNTER: Primary | ICD-10-CM

## 2022-06-20 PROCEDURE — 99213 OFFICE O/P EST LOW 20 MIN: CPT | Mod: ,,, | Performed by: NURSE PRACTITIONER

## 2022-06-20 PROCEDURE — 99211 OFF/OP EST MAY X REQ PHY/QHP: CPT

## 2022-06-20 PROCEDURE — 99213 PR OFFICE/OUTPT VISIT, EST, LEVL III, 20-29 MIN: ICD-10-PCS | Mod: ,,, | Performed by: NURSE PRACTITIONER

## 2022-06-20 NOTE — PATIENT INSTRUCTIONS
Continue to clean wound with dakins and applying hydrafera blue ready to wound bed every other day. Infection precautions.  Change ostomy appliance every 7 days and as needed.

## 2022-06-20 NOTE — PROGRESS NOTES
CHIEF COMPLAINT:  Surgical wound to abdomen.    New colostomy.       HISTORY OF  PRESENT ILLNESS:  56 y.o. White male being seen today for follow-up of surgical wound to abdomen and new colostomy.  Hospitalized at [facility] 5/17/2022 - 5/27/2022 after presenting to ER with c/o falling off ladder and landing on a glass object two days prior.  Attempts at removing foreign object through anus unsuccessful at bedside and in OR.  Underwent rectotomy with diverting loop colostomy on 5/19/2022.  Glass insulator removed from colon.  Developed post-op wound infection, which required IV Zosyn.  Discharged home with 7-day course of Bactrim.  Single in M/F relationship.  Three grown children.  Employed as .  Current wound care to abdominal surgical wound:  Cleaning with Dakins 0.25%, followed by Hydrofera Blue Ready, followed by secondary dressing.  Being changed twice/week and PRN soiling.  NPWT discontinued 6/6/2022, due to seal issues with parallel ostomy.   History includes:        Past Medical History:   Diagnosis Date    Disruption of external operation (surgical) wound, not elsewhere classified, initial encounter 5/30/2022    Open abdominal wall wound 5/30/2022    Status post colostomy 5/30/2022    Tobacco user 5/26/2022    Transient ischemic attack 5/26/2022      Past Surgical History:   Procedure Laterality Date    COLOSTOMY      EXAMINATION UNDER ANESTHESIA N/A 5/18/2022    Procedure: Exam under anesthesia, removal of rectal foreign body;  Surgeon: Chauncey Jimenez Jr., MD;  Location: [facility];  Service: General;  Laterality: N/A;  David/Scheuermann, possible ex lap      Social History     Socioeconomic History    Marital status: Significant Other   Tobacco Use    Smoking status: Current Every Day Smoker     Packs/day: 0.50     Types: Cigarettes    Smokeless tobacco: Never Used   Substance and Sexual Activity    Alcohol use: Not Currently    Drug use: Not Currently        Review of Most Recent Wound  "Care-Related Labs:  Lab Results   Component Value Date/Time    WBC 6.9 05/25/2022 07:05 AM    RBC 4.24 (L) 05/25/2022 07:05 AM    HGB 13.0 (L) 05/25/2022 07:05 AM    HCT 39.4 (L) 05/25/2022 07:05 AM    MCV 92.9 05/25/2022 07:05 AM    MCH 30.7 05/25/2022 07:05 AM    CREATININE 1.05 05/24/2022 03:51 AM    HGBA1C 5.7 10/25/2019 01:43 AM        Today 6/20/2022:  No reported complications with wound care or current treatment plan.  Has all wound care supplies in home.  Denies fevers, chills, wound odor, wound redness, wound pain, or yellow/green drainage.  No new rashes, lesions, or skin breakdown.  No problems with leaking from colostomy pouch; he did that himself last flange/pouch change.  Received ostomy supplies since last visit.     6/13/2022:  Presented to wound clinic today with improperly applied flange (e.g., opening cut to edge of flange [place]).  Ostomy supplies being delivered tomorrow.  No reported complications with abdominal incision care or current treatment plan.  Has all wound care supplies in home.  Denies fevers, chills, wound odor, wound redness, wound pain, or yellow/green drainage.  No new rashes, lesions, or skin breakdown.     6/6/2022:  Had to change colostomy pouching Saturday, and had to discontinue wound vac.  Pouching came loose and he had leaking.  He and SO relay that paint spattered across hands, legs, and feet was from painting a small project in his house.  SO states, "He didn't even get on a ladder."  Denies fevers, chills, wound odor, wound redness, wound pain, or yellow/green drainage.  No new rashes, lesions, or skin breakdown.     5/30/202:   Had to come to ER yesterday after leaving house to go somewhere, when wound vac battery went dead.  ER stabilized wound vac; however, he and girlfriend had to remove wound vac and ostomy flange/pouch yesterday.  Stools have thinned down with Miralax; no problems with appetite, stooling, or n/v.  Taking Bactrim without new s/e.  Denies fevers, " chills, wound redness, wound pain, wound odors, or yellow/green drainage.  Girlfriend staying with him and helping with recuperation, ostomy, and wound vac monitoring.         REVIEW OF SYSTEMS:  Except as stated in HPI, all other 10 body systems normal.      PHYSICAL EXAMINATION AND VITAL SIGNS:    Vitals:    06/20/22 1356   BP: (!) 156/78   Pulse: 90   Resp: 20   Temp: 98.6 °F (37 °C)    There is no height or weight on file to calculate BMI.    General:  Hypertensive, asymptomatic with, afebrile.  Thin, tall gentleman, in no acute distress.  Girlfriend, Aditi, with him.    Respiratory: breathing even, quiet, and unlabored.  No coughing.  Gastrointestinal:  Rounded, soft, and mildly tender abdomen (around abdominal incision).   Midline vertical full-thickness surgical wound to abdomen.  LLQ ostomy (see below).   Musculoskeletal:  full range of motion of all extremities; no joint deformities or edema.   Neurologic:  A&O X 3.  Cranial nerves grossly intact.    Psychiatric:  Calm, cooperative.  Mood and effect normal.  Responses appropriate.   Integumentary:    Full-thickness vertical surgical wound to abdomen.  100% beefy red granulating tissue, with fascia at base of wound.  Wound dimensions significantly smaller from last visit.  No erythema, purulent drainage, periwound edema, odors, induration, bogginess, or fluctuance.            Incision/Site 05/18/22 1556 Abdomen (Active)   05/18/22 1556   Present Prior to Hospital Arrival?:    Side:    Location: Abdomen   Orientation:    Incision Type:    Closure Method:    Additional Comments:    Removal Indication and Assessment:    Wound Outcome:    Removal Indications:    Wound Image   06/20/22 1358   Dressing Appearance Moist drainage;Clean 06/20/22 1358   Drainage Amount Small 06/20/22 1358   Drainage Characteristics/Odor Serosanguineous 06/20/22 1358   Periwound Area Intact 06/20/22 1358   Wound Length (cm) 3.5 cm 06/20/22 1358   Wound Width (cm) 0.7 cm 06/20/22 1358  "  Wound Depth (cm) 0.7 cm 06/20/22 1358   Wound Volume (cm^3) 1.715 cm^3 06/20/22 1358   Wound Surface Area (cm^2) 2.45 cm^2 06/20/22 1358         Stoma Assessment (5/30/2022):    Mucosa:  Texture: Rubbery/Moist  Color: Red  Mucocutaneous junction: Visible today. Stoma protrudes above peristomal skin today    Amount of Protrusion:  Centimeters above skin: Approximately 1.0 cm around entire stoma    Anatomic Location:  Diverting loop colostomy    Size and Shape:   Round.  Measures: 4.0 cm x 4.5 cm x 1.0 cm    Location of Stoma Lumen:  Straight up 1:00 position    Stoma Function: Empties pouch once to twice a day of soft brown stool    Peristomal skin integrity: Healthy, intact.    Peristomal plane characteristics:  Mild rounding of abdomen proximal to   Stoma.  No crevices or creases in supine position.     Patient's preferences and requirements: Girlfriend, Aditi, doing ostomy care    Ostomy  Products:  Zulay flat 2 and 3/4" flange/pouch.  ## [id number] and [id number].    One barrier ring.            ASSESSMENT AND PLAN:    S/P removal of foreign object (glass insulator) from anus, via rectotomy.   Surgery done 5/19/2022.      Surgical wound to mid-abdomen:  NPWT (indigent) discontinued 6/6/2022 due to seal issues with parallel ostomy.    Wound Care Today/Continued wound care treatment plan:  Clean wound with Dakins 0.25%, followed by Hydrofera Blue Ready in base of wound bed, followed by dry gauze up to surrounding skin, and secured with tape.  To be done twice/week, and as-needed, for colostomy leakage and soiling.     S/P Diverting Loop Colostomy:  Mr. Prasad is independent in flange/pouch changes.  No problems with leakage issues.  Has received ostomy supplies from mail-order DME company.      Smoking:  This has been identified as a co-factor towards impaired wound healing.   He has cut back significantly.  He is smoking between three and five cigarettes/day.  Positive reinforcement provided, with teaching on role " smoking plays in wound healing process, as well as overall vascular and organ health.   Complete cessation encouraged.             RTC in two weeks.  Teaching reinforced on s/s of wound deterioration to call wound clinic for.

## 2022-07-07 ENCOUNTER — HOSPITAL ENCOUNTER (OUTPATIENT)
Dept: WOUND CARE | Facility: HOSPITAL | Age: 57
Discharge: HOME | End: 2022-07-07
Attending: NURSE PRACTITIONER
Payer: MEDICAID

## 2022-07-07 VITALS
RESPIRATION RATE: 18 BRPM | DIASTOLIC BLOOD PRESSURE: 95 MMHG | SYSTOLIC BLOOD PRESSURE: 157 MMHG | TEMPERATURE: 99 F | HEART RATE: 89 BPM

## 2022-07-07 DIAGNOSIS — T81.31XA DISRUPTION OF EXTERNAL OPERATION (SURGICAL) WOUND, NOT ELSEWHERE CLASSIFIED, INITIAL ENCOUNTER: ICD-10-CM

## 2022-07-07 DIAGNOSIS — T81.31XD DEHISCENCE OF INCISION, SUBSEQUENT ENCOUNTER: ICD-10-CM

## 2022-07-07 DIAGNOSIS — S31.109D OPEN WOUND ANTERIOR ABDOMINAL WALL, SUBSEQUENT ENCOUNTER: ICD-10-CM

## 2022-07-07 DIAGNOSIS — Z93.3 STATUS POST COLOSTOMY: Primary | ICD-10-CM

## 2022-07-07 PROCEDURE — 99211 OFF/OP EST MAY X REQ PHY/QHP: CPT

## 2022-07-07 PROCEDURE — 99213 OFFICE O/P EST LOW 20 MIN: CPT | Mod: ,,, | Performed by: NURSE PRACTITIONER

## 2022-07-07 PROCEDURE — 99213 PR OFFICE/OUTPT VISIT, EST, LEVL III, 20-29 MIN: ICD-10-PCS | Mod: ,,, | Performed by: NURSE PRACTITIONER

## 2022-07-07 NOTE — PATIENT INSTRUCTIONS
Monitor skin for pressure, injury, infection.  Report changes to provider.  ER precautions for infection.

## 2022-07-07 NOTE — PROGRESS NOTES
CHIEF COMPLAINT:  Surgical wound to abdomen.    New colostomy.       HISTORY OF  PRESENT ILLNESS:  56 y.o. White male being seen today for follow-up of surgical wound to abdomen and new colostomy.  Hospitalized at [facility] 5/17/2022 - 5/27/2022 after presenting to ER with c/o falling off ladder and landing on a glass object two days prior.  Attempts at removing foreign object through anus unsuccessful at bedside and in OR.  Underwent rectotomy with diverting loop colostomy on 5/19/2022.  Glass insulator removed from colon.  Developed post-op wound infection, which required IV Zosyn.  Discharged home with 7-day course of Bactrim.  Single in M/F relationship.  Three grown children.  Employed as .  Current wound care to abdominal surgical wound:  Cleaning with Dakins 0.25%, followed by Hydrofera Blue Ready, followed by secondary dressing.  Being changed twice/week and PRN soiling.  NPWT discontinued 6/6/2022, due to seal issues with parallel ostomy.   History includes:        Past Medical History:   Diagnosis Date    Disruption of external operation (surgical) wound, not elsewhere classified, initial encounter 5/30/2022    Open abdominal wall wound 5/30/2022    Status post colostomy 5/30/2022    Tobacco user 5/26/2022    Transient ischemic attack 5/26/2022      Past Surgical History:   Procedure Laterality Date    COLOSTOMY      EXAMINATION UNDER ANESTHESIA N/A 5/18/2022    Procedure: Exam under anesthesia, removal of rectal foreign body;  Surgeon: Chauncey Jimenez Jr., MD;  Location: [facility];  Service: General;  Laterality: N/A;  David/Scheuermann, possible ex lap      Social History     Socioeconomic History    Marital status: Significant Other   Tobacco Use    Smoking status: Current Every Day Smoker     Packs/day: 0.50     Types: Cigarettes    Smokeless tobacco: Never Used   Substance and Sexual Activity    Alcohol use: Not Currently    Drug use: Not Currently        Review of Most Recent Wound  Care-Related Labs:  Lab Results   Component Value Date/Time    WBC 6.9 05/25/2022 07:05 AM    RBC 4.24 (L) 05/25/2022 07:05 AM    HGB 13.0 (L) 05/25/2022 07:05 AM    HCT 39.4 (L) 05/25/2022 07:05 AM    MCV 92.9 05/25/2022 07:05 AM    MCH 30.7 05/25/2022 07:05 AM    CREATININE 1.05 05/24/2022 03:51 AM    HGBA1C 5.7 10/25/2019 01:43 AM        Today 7/7/2022:  Wanting to be seen by general surgery for reversal of colostomy.  States he cannot work with colostomy () due to pouching not adhering with his level of activity and sweating in summer heat.  Girlfriend says she is agreeable to buying a Stealth Belt, if it will enable him to go to work now, rather than after his reversal procedure.  Abdominal surgical wound has healed since last visit.  Emptying colostomy twice to three times/day; no changes in output.  Has run out of bags and has ordered some off of [employer].  No problems with eating.  No new rashes, lesions, or skin breakdown.     6/20/2022:  No reported complications with wound care or current treatment plan.  Has all wound care supplies in home.  Denies fevers, chills, wound odor, wound redness, wound pain, or yellow/green drainage.  No new rashes, lesions, or skin breakdown.  No problems with leaking from colostomy pouch; he did that himself last flange/pouch change.  Received ostomy supplies since last visit.     6/13/2022:  Presented to wound clinic today with improperly applied flange (e.g., opening cut to edge of flange [employer]).  Ostomy supplies being delivered tomorrow.  No reported complications with abdominal incision care or current treatment plan.  Has all wound care supplies in home.  Denies fevers, chills, wound odor, wound redness, wound pain, or yellow/green drainage.  No new rashes, lesions, or skin breakdown.     6/6/2022:  Had to change colostomy pouching Saturday, and had to discontinue wound vac.  Pouching came loose and he had leaking.  He and SO relay that paint spattered across  "hands, legs, and feet was from painting a small project in his house.  SO states, "He didn't even get on a ladder."  Denies fevers, chills, wound odor, wound redness, wound pain, or yellow/green drainage.  No new rashes, lesions, or skin breakdown.     5/30/202:   Had to come to ER yesterday after leaving house to go somewhere, when wound vac battery went dead.  ER stabilized wound vac; however, he and girlfriend had to remove wound vac and ostomy flange/pouch yesterday.  Stools have thinned down with Miralax; no problems with appetite, stooling, or n/v.  Taking Bactrim without new s/e.  Denies fevers, chills, wound redness, wound pain, wound odors, or yellow/green drainage.  Girlfriend staying with him and helping with recuperation, ostomy, and wound vac monitoring.         REVIEW OF SYSTEMS:  Except as stated in HPI, all other 10 body systems normal.      PHYSICAL EXAMINATION AND VITAL SIGNS:    Vitals:    07/07/22 1402   BP: (!) 157/95   Pulse: 89   Resp: 18   Temp: 98.6 °F (37 °C)    There is no height or weight on file to calculate BMI.    General:  Hypertensive, asymptomatic with, afebrile.  Thin, tall gentleman, in no acute distress.  Girlfriend, Aditi, with him.    Respiratory: breathing even, quiet, and unlabored.  No coughing.  Gastrointestinal:  Rounded, soft, and nontender abdomen.   Midline vertical surgical incisional scar to abdomen.  LLQ ostomy with intact flange/pouch.  Small amount of mustard colored, liquid stool.    Musculoskeletal:  full range of motion of all extremities; no joint deformities or edema.   Neurologic:  A&O X 3.  Cranial nerves grossly intact.    Psychiatric:  Calm, cooperative.  Mood and effect normal.  Responses appropriate.   Integumentary:    Vertical surgical wound to abdomen 100% epithelialized (healed).             Incision/Site 05/18/22 1556 Abdomen (Active)   05/18/22 1556   Present Prior to Hospital Arrival?:    Side:    Location: Abdomen   Orientation:    Incision " "Type:    Closure Method:    Additional Comments:    Removal Indication and Assessment:    Wound Outcome:    Removal Indications:    Appearance Epithelialization 07/07/22 1403         Stoma Assessment (5/30/2022):    Mucosa:  Texture: Rubbery/Moist  Color: Red  Mucocutaneous junction: Visible today. Stoma protrudes above peristomal skin today    Amount of Protrusion:  Centimeters above skin: Approximately 1.0 cm around entire stoma    Anatomic Location:  Diverting loop colostomy    Size and Shape:   Round.  Measures: 4.0 cm x 4.5 cm x 1.0 cm    Location of Stoma Lumen:  Straight up 1:00 position    Stoma Function: Empties pouch once to twice a day of soft brown stool    Peristomal skin integrity: Healthy, intact.    Peristomal plane characteristics:  Mild rounding of abdomen proximal to   Stoma.  No crevices or creases in supine position.     Patient's preferences and requirements: Aurelio Aditi, doing ostomy care    Ostomy  Products:  Zulay flat 2 and 3/4" flange/pouch.  ## [id number].    One barrier ring.                  ASSESSMENT AND PLAN:    S/P removal of foreign object (glass insulator) from anus, via rectotomy.   Surgery done 5/19/2022.      Surgical wound to mid-abdomen:  Wound healed today.    NPWT (indigent) discontinued 6/6/2022 due to seal issues with parallel ostomy.    Teaching provided on remodeling phase of wound healing, risk of wound reopening, and s/s of deterioration to contact wound clinic promptly for, with rationale.      S/P Diverting Loop Colostomy:  Mr. Prasad is independent in flange/pouch changes.  No problems with leakage issues.  Has received ostomy supplies from mail-order DME company in past; however, has used presently allocated supply and is ordering supplies off [employer], pending next shipment, per insurance allocated supplies.   Internal referral to [facility] for reversal surgery evaluation.  Instructed Mr. Prasad that surgery is done between three and six " months of original colon surgery, with rationale.    Web address to [employer] provided to him, with instructions of how that product works, expected benefits, and ability to return to work with that product.            We will see Mr. Prasad on an as-needed basis for any future colostomy or wound-related issues.

## 2022-08-23 ENCOUNTER — OFFICE VISIT (OUTPATIENT)
Dept: SURGERY | Facility: CLINIC | Age: 57
End: 2022-08-23
Payer: MEDICAID

## 2022-08-23 VITALS
BODY MASS INDEX: 24 KG/M2 | HEART RATE: 100 BPM | OXYGEN SATURATION: 100 % | HEIGHT: 74 IN | DIASTOLIC BLOOD PRESSURE: 99 MMHG | SYSTOLIC BLOOD PRESSURE: 152 MMHG | WEIGHT: 187 LBS | TEMPERATURE: 98 F | RESPIRATION RATE: 18 BRPM

## 2022-08-23 DIAGNOSIS — T81.31XA DISRUPTION OF EXTERNAL OPERATION (SURGICAL) WOUND, NOT ELSEWHERE CLASSIFIED, INITIAL ENCOUNTER: ICD-10-CM

## 2022-08-23 PROCEDURE — 99215 OFFICE O/P EST HI 40 MIN: CPT | Mod: PBBFAC

## 2022-08-23 NOTE — PATIENT INSTRUCTIONS
Orders for pt to have Barium Enema done will be ordered by provider. Pt will return to clinic 3-4 weeks or sooner if barium enema is done sooner. Pt education given both written and verbal. TM

## 2022-08-23 NOTE — PROGRESS NOTES
Surgery Clinic Note     CC: f/u for operative planning re. colostomy reversal    HPI:  56-year-old w/PMH of HTN, TIA x2, and a fall from a ladder where he landed on a glass object s/p colostomy (05/18/2022). He presents today for a f/u visit to discuss operative planning for a colostomy reversal. It has now been 13 weeks since the date of his ostomy creation and he has had no wound or stoma complications since being discharged. He denies any drainage, swelling, erythema, degenerative changes in his stoma, or issues with ostomy output. Denies any significant weight changes since his procedure and he is tolerating a solid diet w/o N/V/D. Denies f/c/n/v/CP/SOB. Of note, he is currently having difficulties with urinating, stating he cannot empty his bladder entirely and has had incontinence 6x over the last week. He states the incontinence occurs if he cannot make it to the bathroom in time. He reports this has been an ongoing issue since his surgery. He does not have any hematuria or dysuria. He has no other complaints at this time.     PMH:   Past Medical History:   Diagnosis Date    Disruption of external operation (surgical) wound, not elsewhere classified, initial encounter 5/30/2022    Open abdominal wall wound 5/30/2022    Status post colostomy 5/30/2022    Tobacco user 5/26/2022    Transient ischemic attack 5/26/2022       PSH:   Past Surgical History:   Procedure Laterality Date    COLOSTOMY      EXAMINATION UNDER ANESTHESIA N/A 5/18/2022    Procedure: Exam under anesthesia, removal of rectal foreign body;  Surgeon: Chauncey Jimenez Jr., MD;  Location: [facility];  Service: General;  Laterality: N/A;  David/Schebiancamann, possible ex lap       Fam Hx:   History reviewed. No pertinent family history.    Social Hx:   Social History     Socioeconomic History    Marital status: Significant Other   Tobacco Use    Smoking status: Current Every Day Smoker     Packs/day: 0.50     Types: Cigarettes     "Smokeless tobacco: Never Used   Substance and Sexual Activity    Alcohol use: Not Currently    Drug use: Not Currently       Allergies:  Review of patient's allergies indicates:  No Known Allergies    ROS: Negative except above     Current Outpatient Medications on File Prior to Visit   Medication Sig Dispense Refill    oxyCODONE (ROXICODONE) 5 MG immediate release tablet Take 5 mg by mouth every 6 (six) hours as needed.      polyethylene glycol (GLYCOLAX) 17 gram PwPk Take by mouth. Once a day      sulfamethoxazole-trimethoprim 800-160mg (BACTRIM DS) 800-160 mg Tab Take 1 tablet by mouth 2 (two) times daily.       No current facility-administered medications on file prior to visit.       Physical Exam  BP (!) 152/99 (BP Location: Left arm, Patient Position: Sitting)   Pulse 100   Temp 97.7 °F (36.5 °C) (Oral)   Resp 18   Ht 6' 2" (1.88 m)   Wt 84.8 kg (187 lb)   SpO2 100%   BMI 24.01 kg/m²   General: NAD, AAO X 3  CV: Radial, DP, AT pulses palpated bilaterally, RRR  Resp: No difficulties breathing on RA  Abdomen: soft, non-tender, non-distended. Well-healed incision around the umbilicus. Stoma normal in appearance with no discoloration or degenerative tissue changes. Ostomy bag with fecal matter.      ASSESSMENT/PLAN  56-year-old w/PMH of HTN, TIA, rectal foreign body 2/2 trauma s/p ex-lap, rectotomy and loop colostomy (05/18/2022) who presents today to discuss operative plans for ostomy reversal in the near future.    - Scheduled barium enema to evaluate patency of distal colon  - RTC 3-4 weeks post enema (or sooner if able to, pending scheduling) for surgical scheduling   - Monitor incontinence symptoms for improvement    Pasquale Martines MD  [employer] General Surgery, PGY-1  "

## 2022-09-16 ENCOUNTER — TELEPHONE (OUTPATIENT)
Dept: SURGERY | Facility: CLINIC | Age: 57
End: 2022-09-16
Payer: MEDICAID

## 2022-09-16 NOTE — TELEPHONE ENCOUNTER
----- Message from Vaishali Chavez sent at 9/16/2022  1:56 PM CDT -----  Regarding: RE: Reschedule Appointment  Appt cx-called and left voicemail for patient to call [facility] to schedule barium enema.  ----- Message -----  From: Raúl Comer LPN  Sent: 9/16/2022   7:54 AM CDT  To: Vaishali Chavez  Subject: Reschedule Appointment                           Please reschedule patient appointment on 09/20/22. Patient has barium enema that is ordered and he needs to contact centralized scheduling to get a date to do it. Can followup in clinic for ostomy reversal evaluation after barium enema is completed. Thank you.

## 2022-09-29 ENCOUNTER — HOSPITAL ENCOUNTER (OUTPATIENT)
Dept: RADIOLOGY | Facility: HOSPITAL | Age: 57
Discharge: HOME | End: 2022-09-29
Payer: MEDICAID

## 2022-09-29 DIAGNOSIS — T81.31XA DISRUPTION OF EXTERNAL OPERATION (SURGICAL) WOUND, NOT ELSEWHERE CLASSIFIED, INITIAL ENCOUNTER: ICD-10-CM

## 2022-09-29 PROCEDURE — 74270 X-RAY XM COLON 1CNTRST STD: CPT | Mod: TC

## 2022-10-04 ENCOUNTER — ANESTHESIA EVENT (OUTPATIENT)
Dept: SURGERY | Facility: HOSPITAL | Age: 57
DRG: 346 | End: 2022-10-04
Payer: MEDICAID

## 2022-10-04 ENCOUNTER — OFFICE VISIT (OUTPATIENT)
Dept: SURGERY | Facility: CLINIC | Age: 57
End: 2022-10-04
Payer: MEDICAID

## 2022-10-04 VITALS
SYSTOLIC BLOOD PRESSURE: 154 MMHG | TEMPERATURE: 98 F | WEIGHT: 197.06 LBS | BODY MASS INDEX: 25.29 KG/M2 | DIASTOLIC BLOOD PRESSURE: 108 MMHG | HEART RATE: 97 BPM | OXYGEN SATURATION: 99 % | HEIGHT: 74 IN | RESPIRATION RATE: 18 BRPM

## 2022-10-04 DIAGNOSIS — Z01.818 OTHER SPECIFIED PRE-OPERATIVE EXAMINATION: Primary | ICD-10-CM

## 2022-10-04 DIAGNOSIS — Z93.3 COLOSTOMY PRESENT ON ADMISSION: Primary | ICD-10-CM

## 2022-10-04 DIAGNOSIS — Z01.818 PRE-OP TESTING: ICD-10-CM

## 2022-10-04 PROCEDURE — 99215 OFFICE O/P EST HI 40 MIN: CPT | Mod: PBBFAC

## 2022-10-04 RX ORDER — ONDANSETRON 4 MG/1
8 TABLET, ORALLY DISINTEGRATING ORAL EVERY 8 HOURS PRN
Status: CANCELLED | OUTPATIENT
Start: 2022-10-04

## 2022-10-04 RX ORDER — METRONIDAZOLE 500 MG/1
500 TABLET ORAL EVERY 8 HOURS
Qty: 3 TABLET | Refills: 0 | Status: ON HOLD | OUTPATIENT
Start: 2022-10-04 | End: 2022-10-19

## 2022-10-04 RX ORDER — NEOMYCIN SULFATE 500 MG/1
2000 TABLET ORAL 3 TIMES DAILY
Qty: 6 TABLET | Refills: 0 | Status: ON HOLD | OUTPATIENT
Start: 2022-10-04 | End: 2022-10-19

## 2022-10-04 RX ORDER — SODIUM CHLORIDE 9 MG/ML
INJECTION, SOLUTION INTRAVENOUS CONTINUOUS
Status: CANCELLED | OUTPATIENT
Start: 2022-10-04

## 2022-10-04 NOTE — PROGRESS NOTES
Surgery Clinic Note     CC: f/u after barium enema    HPI:  ***-year-old   Denies f/c/n/v/CP/SOB.    PMH:    Transient ischemic attack 5/26/2022     Hypertension    PSH: Sigmoidectomy w/ end colostomy    Fam Hx: No past family history.    Social Hx:   Tobacco Use    Smoking status: Current Every Day Smoker       Packs/day: 0.50       Types: Cigarettes    Smokeless tobacco: Never Used   Substance Use Topics    Alcohol use: Not Currently    Drug use: Not Currently       Allergies: ***    ROS: Negative except above     Current Outpatient Medications on File Prior to Visit   Medication Sig Dispense Refill    oxyCODONE (ROXICODONE) 5 MG immediate release tablet Take 5 mg by mouth every 6 (six) hours as needed.      polyethylene glycol (GLYCOLAX) 17 gram PwPk Take by mouth. Once a day      sulfamethoxazole-trimethoprim 800-160mg (BACTRIM DS) 800-160 mg Tab Take 1 tablet by mouth 2 (two) times daily.       No current facility-administered medications on file prior to visit.       Physical Exam  There were no vitals taken for this visit.  General: NAD, AAO X 3  CV: Regular rate and rhythm without murmurs  Resp: Clear to ascultation bilaterally  Abdomen: soft, non-tender, non-distended, bowel sounds present    Surgical Pathology: ***    ASSESSMENT/PLAN  ***-year-old     -***  -***      Mercedes Reyes, [facility]

## 2022-10-04 NOTE — PROGRESS NOTES
Surgery Clinic Note      CC: f/u after barium enema     HPI:  Isaías Prasad 57 y.o. -year-old male presented for f/u post barium enema on 8/23/2022 for colostomy reversal scheduling. Patient reports no pain or abnormal discharge of colostomy bag. Patient reports smoking .5 pack a day, alcohol consumption 4 times a week, and recreation marijuana smoking usage. Currently on no medications. Denies f/c/n/v/CP/SOB.     PMH:    Transient ischemic attack 5/26/2022      Hypertension     PSH: Sigmoidectomy w/ loop colostomy due to foreign body in colon 5/18/2022 Dr. Jimenez     Fam Hx: No past family history.     Social Hx:         Tobacco Use    Smoking status: Current Every Day Smoker       Packs/day: 0.50       Types: Cigarettes    Smokeless tobacco: Never Used   Substance Use Topics    Alcohol use: Not Currently    Drug use: Not Currently         Allergies: Review of patient's allergies indicates:  No Known Allergies       ROS: Negative except above             Current Outpatient Medications on File Prior to Visit   Medication Sig Dispense Refill    oxyCODONE (ROXICODONE) 5 MG immediate release tablet Take 5 mg by mouth every 6 (six) hours as needed.        polyethylene glycol (GLYCOLAX) 17 gram PwPk Take by mouth. Once a day        sulfamethoxazole-trimethoprim 800-160mg (BACTRIM DS) 800-160 mg Tab Take 1 tablet by mouth 2 (two) times daily.          No current facility-administered medications on file prior to visit.         Physical Exam  There were no vitals taken for this visit.  General: NAD, AAO X 3  CV: Regular rate and rhythm without murmurs, normal S1 and S2   Resp: Clear to ascultation bilaterally, CTAB   Abdomen: soft, non-tender, non-distended, bowel sounds present, ostomy pink and patent loop colostomy     Imaging:  EXAMINATION:  FL BARIUM ENEMA     CLINICAL HISTORY:  Disruption of external operation (surgical) wound, not elsewhere classified, initial encounter     TECHNIQUE:  A  radiograph was  obtained. Following this, a catheter was placed into the rectum and contrast instilled via gravity. Multiple fluoroscopic and overhead images were obtained in various positions. Total fluoroscopy time is 1 minute 15 seconds with absorbed dose of 90.02 mGy.     COMPARISON:  Radiography 05/22/2022     FINDINGS:   radiographs show a left lower quadrant loop colostomy with a nonobstructive bowel gas pattern.  Rectum fills readily with contrast.  Difficult to distend the superior rectum due to proximity to the ostomy and contrast exiting the lumen into the ostomy bag.  With this limitation, no significant rectal narrowing is identified.     Impression:     Contrast reaches the ostomy site with no significant rectal abnormality identified.        Electronically signed by: Krishna Mauro  Date:                                            09/29/2022  Time:                                           12:43     ASSESSMENT/PLAN   Isaías Prasad 57 y.o. -year-old male presented for f/u post barium enema on 8/23/2022 for colostomy reversal scheduling     -talked about smoking cessation starting today and after surgery for optimal surgical outcomes  -planned colostomy reversal on Oct. 17 2022 with Dr. Ernestina Wilhelm  [facility] [place] [facility]    Patient seen and examined.  Hx of rectotomy and removal of FB with loop colostomy created.  Doing well  Had BE with patency distally no stricture    Plan:  Oct 17 for colostomy take down  Discussed with ernestina  Patient states will stop smoking today    Juan Antonio Roman MD

## 2022-10-04 NOTE — PROGRESS NOTES
I have reviewed the notes, assessments, and/or procedures performed by the resident, I concur with her/his documentation of Isaías Prasad.     Kathryn Santamaria MD   The patient was contacted and her results were given.  The patient expressed understanding.

## 2022-10-04 NOTE — ANESTHESIA PREPROCEDURE EVALUATION
10/04/2022  Isaías Prasad is a 57 y.o., male smoker possible substance abuse, HTN, CVA no residual, DD EF 40%,  s/p fall and sigmoidectomy 5/22 for colostomy closure.    COVID STATUS: VACCINE X 2 (PFIZER, LASTLY 9/21/21)  BETA-BLOCKER: NONE    PAT NURSE IN-PERSON INTERVIEW 10/14/22    PROBLEM LIST:  -  COLOSTOMY      -  HOSP. 5/17-5/18/22 w/FB RECTUM AFTER FALLING OFF ROOF ONTO GLASS OBJECT         - S/P 5/18/22 EX-LAP, REMOVAL RECTAL FB, SIGMOIDECTOMY w/LOOP COLOSTOMY  -  HTN (PREV. On LISINOPRIL)  -  DIASTOLIC DYSFUNCTION - 10/25/19 EF 40%  -  CAROTID ARTERY DISEASE (BILAT. <50%)  -  HLD (PREV. On STATIN)  -  Hx 10/24/19 TIA/CVA  (INITIAL SLURRED SPEECH, RIGHT SIDED WEAKNESS) w/NO RESIDUAL      - HOSP. 10/24-10/26/19 w/TIA         - 10/24/19 CT HEAD = There are remote bilateral basal ganglia lacunar infarcts. There is small remote area of encephalomalacia in the right frontal lobe. Patchy periventricular white matter decreased attenuation is seen. No sulcal effacement. No acute large territory infarct evident Gray-white matter differentiation is preserved. No hyperdense MCA sign. Intracranial vascular calcifications are noted. 10/25/19 MRI BRAIN = Small focus nonhemorrhagic acute or subacute ischemia left internal capsule posterior limb.  -  SMOKER 10 PPY  -  ETOH - 2 MIXED DRINKS/WEEK; Hx ETOH ABUSE  -  MARIJUANA USE (CURRENT); (10/2019 UDS + AMPHETAMINES BUT DENIES ANY AMPHETAMINE USE)    AM Rx DOS: NONE    ORDERS -   SURGEON: 10/24/19 CXR; 5/24/22 CMP; 5/25/22 CBC; NO NEW ORDERS  ANESTHESIA: 10/14/22 T&S, EKG      Pre-op Assessment          Review of Systems      Physical Exam  General: Well nourished    Airway:  Mallampati: II / II  Mouth Opening: Normal  TM Distance: Normal  Tongue: Normal  Neck ROM: Normal ROM    Dental:  Edentulous    Chest/Lungs:  Clear to auscultation, Normal Respiratory  Rate    Heart:  Rate: Normal  Rhythm: Regular Rhythm        Anesthesia Plan  Type of Anesthesia, risks & benefits discussed:    Anesthesia Type: Gen ETT  Intra-op Monitoring Plan: Standard ASA Monitors  Post Op Pain Control Plan: multimodal analgesia and IV/PO Opioids PRN  Induction:  IV  Airway Plan: Direct  Informed Consent: Informed consent signed with the Patient and all parties understand the risks and agree with anesthesia plan.  All questions answered.   ASA Score: 3  Day of Surgery Review of History & Physical: H&P Update referred to the surgeon/provider.I have interviewed and examined the patient. I have reviewed the patient's H&P dated: There are no significant changes. H&P completed by Anesthesiologist.    Ready For Surgery From Anesthesia Perspective.     .    Lab Results   Component Value Date    WBC 6.9 05/25/2022    HGB 13.0 (L) 05/25/2022    HCT 39.4 (L) 05/25/2022    MCV 92.9 05/25/2022     05/25/2022     CMP  Sodium Level   Date Value Ref Range Status   05/24/2022 140 136 - 145 mmol/L Final     Potassium Level   Date Value Ref Range Status   05/24/2022 3.8 3.5 - 5.1 mmol/L Final     Carbon Dioxide   Date Value Ref Range Status   05/24/2022 27 22 - 29 mmol/L Final     Blood Urea Nitrogen   Date Value Ref Range Status   05/24/2022 10.4 8.4 - 25.7 mg/dL Final     Creatinine   Date Value Ref Range Status   05/24/2022 1.05 0.73 - 1.18 mg/dL Final     Calcium Level Total   Date Value Ref Range Status   05/24/2022 8.9 8.4 - 10.2 mg/dL Final     Albumin Level   Date Value Ref Range Status   05/24/2022 2.6 (L) 3.5 - 5.0 gm/dL Final     Bilirubin Total   Date Value Ref Range Status   05/24/2022 0.5 <=1.5 mg/dL Final     Alkaline Phosphatase   Date Value Ref Range Status   05/24/2022 366 (H) 40 - 150 unit/L Final     Aspartate Aminotransferase   Date Value Ref Range Status   05/24/2022 44 (H) 5 - 34 unit/L Final     Alanine Aminotransferase   Date Value Ref Range Status   05/24/2022 50 0 - 55 unit/L  Final     Estimated GFR-Non    Date Value Ref Range Status   05/24/2022 >60 mls/min/1.73/m2 Final           10/25/19 ECHO/BUBBLE STUDY          10/24/19 CXR  Findings  Lungs are clear with no visualized focal airspace opacity.  The trachea appears midline.  The cardiomediastinal silhouette is within normal limits.  There is no evidence of pneumothorax or pleural effusion.  Visualized abdomen, soft tissues, and osseous structures are  unremarkable.     Impression  No acute cardiopulmonary process.      5/18/22 ANESTHESIA

## 2022-10-04 NOTE — H&P (VIEW-ONLY)
Surgery Clinic Note      CC: f/u after barium enema     HPI:  Isaías Prasad 57 y.o. -year-old male presented for f/u post barium enema on 8/23/2022 for colostomy reversal scheduling. Patient reports no pain or abnormal discharge of colostomy bag. Patient reports smoking .5 pack a day, alcohol consumption 4 times a week, and recreation marijuana smoking usage. Currently on no medications. Denies f/c/n/v/CP/SOB.     PMH:    Transient ischemic attack 5/26/2022      Hypertension     PSH: Sigmoidectomy w/ loop colostomy due to foreign body in colon 5/18/2022 Dr. Jimenez     Fam Hx: No past family history.     Social Hx:         Tobacco Use    Smoking status: Current Every Day Smoker       Packs/day: 0.50       Types: Cigarettes    Smokeless tobacco: Never Used   Substance Use Topics    Alcohol use: Not Currently    Drug use: Not Currently         Allergies: Review of patient's allergies indicates:  No Known Allergies       ROS: Negative except above             Current Outpatient Medications on File Prior to Visit   Medication Sig Dispense Refill    oxyCODONE (ROXICODONE) 5 MG immediate release tablet Take 5 mg by mouth every 6 (six) hours as needed.        polyethylene glycol (GLYCOLAX) 17 gram PwPk Take by mouth. Once a day        sulfamethoxazole-trimethoprim 800-160mg (BACTRIM DS) 800-160 mg Tab Take 1 tablet by mouth 2 (two) times daily.          No current facility-administered medications on file prior to visit.         Physical Exam  There were no vitals taken for this visit.  General: NAD, AAO X 3  CV: Regular rate and rhythm without murmurs, normal S1 and S2   Resp: Clear to ascultation bilaterally, CTAB   Abdomen: soft, non-tender, non-distended, bowel sounds present, ostomy pink and patent loop colostomy     Imaging:  EXAMINATION:  FL BARIUM ENEMA     CLINICAL HISTORY:  Disruption of external operation (surgical) wound, not elsewhere classified, initial encounter     TECHNIQUE:  A  radiograph was  obtained. Following this, a catheter was placed into the rectum and contrast instilled via gravity. Multiple fluoroscopic and overhead images were obtained in various positions. Total fluoroscopy time is 1 minute 15 seconds with absorbed dose of 90.02 mGy.     COMPARISON:  Radiography 05/22/2022     FINDINGS:   radiographs show a left lower quadrant loop colostomy with a nonobstructive bowel gas pattern.  Rectum fills readily with contrast.  Difficult to distend the superior rectum due to proximity to the ostomy and contrast exiting the lumen into the ostomy bag.  With this limitation, no significant rectal narrowing is identified.     Impression:     Contrast reaches the ostomy site with no significant rectal abnormality identified.        Electronically signed by: Krishna Mauro  Date:                                            09/29/2022  Time:                                           12:43     ASSESSMENT/PLAN   Isaías Prasad 57 y.o. -year-old male presented for f/u post barium enema on 8/23/2022 for colostomy reversal scheduling     -talked about smoking cessation starting today and after surgery for optimal surgical outcomes  -planned colostomy reversal on Oct. 17 2022 with Dr. Ernestina Wilhelm  [facility] MS[facility]    Patient seen and examined.  Hx of rectotomy and removal of FB with loop colostomy created.  Doing well  Had BE with patency distally no stricture    Plan:  Oct 17 for colostomy take down  Discussed with ernestina  Patient states will stop smoking today    Juan Antonio Roman MD

## 2022-10-04 NOTE — PROGRESS NOTES
Patient seen by Dr Hassan. Surgery has been scheduled for 10/17/22 with pre op scheduled for 10/14/22.  Patient will f/u in clinic for post op appt 2 weeks after surgery. All information explained with understanding. Prep prescriptions and instructions explained and given to pt.

## 2022-10-07 ENCOUNTER — TELEPHONE (OUTPATIENT)
Dept: UROLOGY | Facility: CLINIC | Age: 57
End: 2022-10-07
Payer: MEDICAID

## 2022-10-07 NOTE — TELEPHONE ENCOUNTER
[facility] Pharmacist, Ada, called to say that Colyte is unavailable at all pharmacies and asked if Dr. Roman would like for patient to have Golytely instead.  Dr. Roman notified and confirmed that he wants patient to have this.  Take as directed the night before the surgery. Phone pharmacist back at [phone], to give new prescription.

## 2022-10-14 ENCOUNTER — OFFICE VISIT (OUTPATIENT)
Dept: PREADMISSION TESTING | Facility: HOSPITAL | Age: 57
End: 2022-10-14
Attending: COLON & RECTAL SURGERY
Payer: MEDICAID

## 2022-10-14 VITALS — BODY MASS INDEX: 24.71 KG/M2 | WEIGHT: 192.44 LBS

## 2022-10-14 DIAGNOSIS — Z01.818 OTHER SPECIFIED PRE-OPERATIVE EXAMINATION: Primary | ICD-10-CM

## 2022-10-14 DIAGNOSIS — Z01.818 OTHER SPECIFIED PRE-OPERATIVE EXAMINATION: ICD-10-CM

## 2022-10-14 LAB
GROUP & RH: NORMAL
INDIRECT COOMBS: NORMAL

## 2022-10-14 PROCEDURE — 36415 COLL VENOUS BLD VENIPUNCTURE: CPT

## 2022-10-14 PROCEDURE — 86900 BLOOD TYPING SEROLOGIC ABO: CPT | Performed by: NURSE PRACTITIONER

## 2022-10-14 PROCEDURE — 86901 BLOOD TYPING SEROLOGIC RH(D): CPT | Performed by: NURSE PRACTITIONER

## 2022-10-14 PROCEDURE — 86850 RBC ANTIBODY SCREEN: CPT | Performed by: NURSE PRACTITIONER

## 2022-10-14 PROCEDURE — 93005 ELECTROCARDIOGRAM TRACING: CPT

## 2022-10-17 ENCOUNTER — ANESTHESIA (OUTPATIENT)
Dept: SURGERY | Facility: HOSPITAL | Age: 57
DRG: 346 | End: 2022-10-17
Payer: MEDICAID

## 2022-10-17 ENCOUNTER — HOSPITAL ENCOUNTER (INPATIENT)
Facility: HOSPITAL | Age: 57
LOS: 2 days | Discharge: HOME | DRG: 346 | End: 2022-10-19
Attending: COLON & RECTAL SURGERY | Admitting: COLON & RECTAL SURGERY
Payer: MEDICAID

## 2022-10-17 DIAGNOSIS — Z93.3 COLOSTOMY PRESENT ON ADMISSION: ICD-10-CM

## 2022-10-17 PROCEDURE — 25000242 PHARM REV CODE 250 ALT 637 W/ HCPCS

## 2022-10-17 PROCEDURE — 63600175 PHARM REV CODE 636 W HCPCS

## 2022-10-17 PROCEDURE — 71000039 HC RECOVERY, EACH ADD'L HOUR: Performed by: COLON & RECTAL SURGERY

## 2022-10-17 PROCEDURE — 27201423 OPTIME MED/SURG SUP & DEVICES STERILE SUPPLY: Performed by: COLON & RECTAL SURGERY

## 2022-10-17 PROCEDURE — 25000003 PHARM REV CODE 250: Performed by: NURSE ANESTHETIST, CERTIFIED REGISTERED

## 2022-10-17 PROCEDURE — 71000033 HC RECOVERY, INTIAL HOUR: Performed by: COLON & RECTAL SURGERY

## 2022-10-17 PROCEDURE — 37000009 HC ANESTHESIA EA ADD 15 MINS: Performed by: COLON & RECTAL SURGERY

## 2022-10-17 PROCEDURE — 36000709 HC OR TIME LEV III EA ADD 15 MIN: Performed by: COLON & RECTAL SURGERY

## 2022-10-17 PROCEDURE — 25000003 PHARM REV CODE 250: Performed by: STUDENT IN AN ORGANIZED HEALTH CARE EDUCATION/TRAINING PROGRAM

## 2022-10-17 PROCEDURE — 25000003 PHARM REV CODE 250: Performed by: ANESTHESIOLOGY

## 2022-10-17 PROCEDURE — 94640 AIRWAY INHALATION TREATMENT: CPT

## 2022-10-17 PROCEDURE — S0030 INJECTION, METRONIDAZOLE: HCPCS | Performed by: NURSE ANESTHETIST, CERTIFIED REGISTERED

## 2022-10-17 PROCEDURE — 36000708 HC OR TIME LEV III 1ST 15 MIN: Performed by: COLON & RECTAL SURGERY

## 2022-10-17 PROCEDURE — 37000008 HC ANESTHESIA 1ST 15 MINUTES: Performed by: COLON & RECTAL SURGERY

## 2022-10-17 PROCEDURE — 99900035 HC TECH TIME PER 15 MIN (STAT)

## 2022-10-17 PROCEDURE — 63600175 PHARM REV CODE 636 W HCPCS: Performed by: NURSE ANESTHETIST, CERTIFIED REGISTERED

## 2022-10-17 PROCEDURE — 63600175 PHARM REV CODE 636 W HCPCS: Performed by: ANESTHESIOLOGY

## 2022-10-17 PROCEDURE — 94761 N-INVAS EAR/PLS OXIMETRY MLT: CPT

## 2022-10-17 PROCEDURE — 11000001 HC ACUTE MED/SURG PRIVATE ROOM

## 2022-10-17 PROCEDURE — 63600175 PHARM REV CODE 636 W HCPCS: Performed by: STUDENT IN AN ORGANIZED HEALTH CARE EDUCATION/TRAINING PROGRAM

## 2022-10-17 PROCEDURE — 88304 TISSUE EXAM BY PATHOLOGIST: CPT | Performed by: COLON & RECTAL SURGERY

## 2022-10-17 RX ORDER — HYDROMORPHONE HYDROCHLORIDE 1 MG/ML
INJECTION, SOLUTION INTRAMUSCULAR; INTRAVENOUS; SUBCUTANEOUS
Status: COMPLETED
Start: 2022-10-17 | End: 2022-10-17

## 2022-10-17 RX ORDER — METRONIDAZOLE 500 MG/100ML
INJECTION, SOLUTION INTRAVENOUS
Status: DISCONTINUED | OUTPATIENT
Start: 2022-10-17 | End: 2022-10-17

## 2022-10-17 RX ORDER — METOCLOPRAMIDE HYDROCHLORIDE 5 MG/ML
10 INJECTION INTRAMUSCULAR; INTRAVENOUS ONCE AS NEEDED
Status: DISCONTINUED | OUTPATIENT
Start: 2022-10-17 | End: 2022-10-17

## 2022-10-17 RX ORDER — OXYCODONE HYDROCHLORIDE 5 MG/1
10 TABLET ORAL EVERY 4 HOURS PRN
Status: DISCONTINUED | OUTPATIENT
Start: 2022-10-17 | End: 2022-10-19 | Stop reason: HOSPADM

## 2022-10-17 RX ORDER — ACETAMINOPHEN 325 MG/1
650 TABLET ORAL EVERY 8 HOURS PRN
Status: DISCONTINUED | OUTPATIENT
Start: 2022-10-17 | End: 2022-10-19 | Stop reason: HOSPADM

## 2022-10-17 RX ORDER — IPRATROPIUM BROMIDE AND ALBUTEROL SULFATE 2.5; .5 MG/3ML; MG/3ML
3 SOLUTION RESPIRATORY (INHALATION) ONCE
Status: DISCONTINUED | OUTPATIENT
Start: 2022-10-17 | End: 2022-10-18

## 2022-10-17 RX ORDER — DEXAMETHASONE SODIUM PHOSPHATE 4 MG/ML
INJECTION, SOLUTION INTRA-ARTICULAR; INTRALESIONAL; INTRAMUSCULAR; INTRAVENOUS; SOFT TISSUE
Status: DISCONTINUED | OUTPATIENT
Start: 2022-10-17 | End: 2022-10-17

## 2022-10-17 RX ORDER — LIDOCAINE HYDROCHLORIDE 10 MG/ML
1 INJECTION, SOLUTION EPIDURAL; INFILTRATION; INTRACAUDAL; PERINEURAL ONCE
Status: DISCONTINUED | OUTPATIENT
Start: 2022-10-17 | End: 2022-10-17

## 2022-10-17 RX ORDER — MUPIROCIN 20 MG/G
OINTMENT TOPICAL 2 TIMES DAILY
Status: DISCONTINUED | OUTPATIENT
Start: 2022-10-17 | End: 2022-10-19 | Stop reason: HOSPADM

## 2022-10-17 RX ORDER — METRONIDAZOLE 500 MG/100ML
INJECTION, SOLUTION INTRAVENOUS
Status: DISCONTINUED
Start: 2022-10-17 | End: 2022-10-17 | Stop reason: WASHOUT

## 2022-10-17 RX ORDER — OXYCODONE HYDROCHLORIDE 5 MG/1
5 TABLET ORAL
Status: DISCONTINUED | OUTPATIENT
Start: 2022-10-17 | End: 2022-10-17

## 2022-10-17 RX ORDER — HEPARIN SODIUM 5000 [USP'U]/ML
5000 INJECTION, SOLUTION INTRAVENOUS; SUBCUTANEOUS
Status: COMPLETED | OUTPATIENT
Start: 2022-10-17 | End: 2022-10-17

## 2022-10-17 RX ORDER — SODIUM CHLORIDE 9 MG/ML
INJECTION, SOLUTION INTRAVENOUS CONTINUOUS
Status: DISCONTINUED | OUTPATIENT
Start: 2022-10-17 | End: 2022-10-17

## 2022-10-17 RX ORDER — ENOXAPARIN SODIUM 100 MG/ML
40 INJECTION SUBCUTANEOUS EVERY 24 HOURS
Status: DISCONTINUED | OUTPATIENT
Start: 2022-10-18 | End: 2022-10-19 | Stop reason: HOSPADM

## 2022-10-17 RX ORDER — DIPHENHYDRAMINE HYDROCHLORIDE 50 MG/ML
INJECTION INTRAMUSCULAR; INTRAVENOUS
Status: DISPENSED
Start: 2022-10-17 | End: 2022-10-17

## 2022-10-17 RX ORDER — IPRATROPIUM BROMIDE AND ALBUTEROL SULFATE 2.5; .5 MG/3ML; MG/3ML
3 SOLUTION RESPIRATORY (INHALATION) EVERY 6 HOURS PRN
Status: DISCONTINUED | OUTPATIENT
Start: 2022-10-17 | End: 2022-10-19 | Stop reason: HOSPADM

## 2022-10-17 RX ORDER — MORPHINE SULFATE 10 MG/ML
2 INJECTION INTRAMUSCULAR; INTRAVENOUS; SUBCUTANEOUS
Status: DISCONTINUED | OUTPATIENT
Start: 2022-10-17 | End: 2022-10-19

## 2022-10-17 RX ORDER — SODIUM CHLORIDE, SODIUM LACTATE, POTASSIUM CHLORIDE, CALCIUM CHLORIDE 600; 310; 30; 20 MG/100ML; MG/100ML; MG/100ML; MG/100ML
INJECTION, SOLUTION INTRAVENOUS CONTINUOUS
Status: DISCONTINUED | OUTPATIENT
Start: 2022-10-17 | End: 2022-10-19

## 2022-10-17 RX ORDER — HYDRALAZINE HYDROCHLORIDE 20 MG/ML
10 INJECTION INTRAMUSCULAR; INTRAVENOUS EVERY 6 HOURS PRN
Status: DISCONTINUED | OUTPATIENT
Start: 2022-10-17 | End: 2022-10-19

## 2022-10-17 RX ORDER — MIDAZOLAM HYDROCHLORIDE 1 MG/ML
1 INJECTION INTRAMUSCULAR; INTRAVENOUS ONCE AS NEEDED
Status: COMPLETED | OUTPATIENT
Start: 2022-10-17 | End: 2022-10-17

## 2022-10-17 RX ORDER — KETOROLAC TROMETHAMINE 30 MG/ML
INJECTION, SOLUTION INTRAMUSCULAR; INTRAVENOUS
Status: COMPLETED
Start: 2022-10-17 | End: 2022-10-17

## 2022-10-17 RX ORDER — LABETALOL HCL 20 MG/4 ML
SYRINGE (ML) INTRAVENOUS
Status: DISPENSED
Start: 2022-10-17 | End: 2022-10-18

## 2022-10-17 RX ORDER — LABETALOL HCL 20 MG/4 ML
2.5 SYRINGE (ML) INTRAVENOUS ONCE
Status: COMPLETED | OUTPATIENT
Start: 2022-10-17 | End: 2022-10-17

## 2022-10-17 RX ORDER — HEPARIN SODIUM 5000 [USP'U]/ML
INJECTION, SOLUTION INTRAVENOUS; SUBCUTANEOUS
Status: DISPENSED
Start: 2022-10-17 | End: 2022-10-17

## 2022-10-17 RX ORDER — ACETAMINOPHEN 10 MG/ML
1000 INJECTION, SOLUTION INTRAVENOUS ONCE
Status: COMPLETED | OUTPATIENT
Start: 2022-10-17 | End: 2022-10-17

## 2022-10-17 RX ORDER — ROCURONIUM BROMIDE 10 MG/ML
INJECTION, SOLUTION INTRAVENOUS
Status: DISCONTINUED | OUTPATIENT
Start: 2022-10-17 | End: 2022-10-17

## 2022-10-17 RX ORDER — SODIUM CHLORIDE, SODIUM LACTATE, POTASSIUM CHLORIDE, CALCIUM CHLORIDE 600; 310; 30; 20 MG/100ML; MG/100ML; MG/100ML; MG/100ML
INJECTION, SOLUTION INTRAVENOUS CONTINUOUS
Status: DISCONTINUED | OUTPATIENT
Start: 2022-10-17 | End: 2022-10-17

## 2022-10-17 RX ORDER — ONDANSETRON 2 MG/ML
INJECTION INTRAMUSCULAR; INTRAVENOUS
Status: DISCONTINUED | OUTPATIENT
Start: 2022-10-17 | End: 2022-10-17

## 2022-10-17 RX ORDER — ONDANSETRON 4 MG/1
8 TABLET, ORALLY DISINTEGRATING ORAL EVERY 8 HOURS PRN
Status: DISCONTINUED | OUTPATIENT
Start: 2022-10-17 | End: 2022-10-19 | Stop reason: HOSPADM

## 2022-10-17 RX ORDER — MIDAZOLAM HYDROCHLORIDE 1 MG/ML
INJECTION INTRAMUSCULAR; INTRAVENOUS
Status: DISPENSED
Start: 2022-10-17 | End: 2022-10-17

## 2022-10-17 RX ORDER — ONDANSETRON 4 MG/1
8 TABLET, ORALLY DISINTEGRATING ORAL EVERY 8 HOURS PRN
Status: DISCONTINUED | OUTPATIENT
Start: 2022-10-17 | End: 2022-10-17

## 2022-10-17 RX ORDER — DIPHENHYDRAMINE HYDROCHLORIDE 50 MG/ML
6.25 INJECTION INTRAMUSCULAR; INTRAVENOUS ONCE AS NEEDED
Status: COMPLETED | OUTPATIENT
Start: 2022-10-17 | End: 2022-10-17

## 2022-10-17 RX ORDER — CEFAZOLIN SODIUM 1 G/3ML
INJECTION, POWDER, FOR SOLUTION INTRAMUSCULAR; INTRAVENOUS
Status: DISCONTINUED | OUTPATIENT
Start: 2022-10-17 | End: 2022-10-17

## 2022-10-17 RX ORDER — ACETAMINOPHEN 10 MG/ML
INJECTION, SOLUTION INTRAVENOUS
Status: DISPENSED
Start: 2022-10-17 | End: 2022-10-17

## 2022-10-17 RX ORDER — IPRATROPIUM BROMIDE AND ALBUTEROL SULFATE 2.5; .5 MG/3ML; MG/3ML
SOLUTION RESPIRATORY (INHALATION)
Status: COMPLETED
Start: 2022-10-17 | End: 2022-10-17

## 2022-10-17 RX ORDER — ACETAMINOPHEN 325 MG/1
650 TABLET ORAL EVERY 4 HOURS PRN
Status: DISCONTINUED | OUTPATIENT
Start: 2022-10-17 | End: 2022-10-19 | Stop reason: HOSPADM

## 2022-10-17 RX ORDER — HYDROMORPHONE HYDROCHLORIDE 1 MG/ML
0.2 INJECTION, SOLUTION INTRAMUSCULAR; INTRAVENOUS; SUBCUTANEOUS EVERY 5 MIN PRN
Status: DISCONTINUED | OUTPATIENT
Start: 2022-10-17 | End: 2022-10-17

## 2022-10-17 RX ORDER — OXYCODONE HYDROCHLORIDE 5 MG/1
5 TABLET ORAL EVERY 4 HOURS PRN
Status: DISCONTINUED | OUTPATIENT
Start: 2022-10-17 | End: 2022-10-19 | Stop reason: HOSPADM

## 2022-10-17 RX ORDER — KETOROLAC TROMETHAMINE 30 MG/ML
INJECTION, SOLUTION INTRAMUSCULAR; INTRAVENOUS
Status: DISCONTINUED | OUTPATIENT
Start: 2022-10-17 | End: 2022-10-17

## 2022-10-17 RX ORDER — TALC
6 POWDER (GRAM) TOPICAL NIGHTLY PRN
Status: DISCONTINUED | OUTPATIENT
Start: 2022-10-17 | End: 2022-10-19 | Stop reason: HOSPADM

## 2022-10-17 RX ORDER — SODIUM CHLORIDE 0.9 % (FLUSH) 0.9 %
10 SYRINGE (ML) INJECTION
Status: DISCONTINUED | OUTPATIENT
Start: 2022-10-17 | End: 2022-10-19

## 2022-10-17 RX ORDER — LIDOCAINE HYDROCHLORIDE 20 MG/ML
INJECTION INTRAVENOUS
Status: DISCONTINUED | OUTPATIENT
Start: 2022-10-17 | End: 2022-10-17

## 2022-10-17 RX ORDER — DROPERIDOL 2.5 MG/ML
0.25 INJECTION, SOLUTION INTRAMUSCULAR; INTRAVENOUS ONCE AS NEEDED
Status: DISCONTINUED | OUTPATIENT
Start: 2022-10-17 | End: 2022-10-17

## 2022-10-17 RX ORDER — FENTANYL CITRATE 50 UG/ML
INJECTION, SOLUTION INTRAMUSCULAR; INTRAVENOUS
Status: DISCONTINUED | OUTPATIENT
Start: 2022-10-17 | End: 2022-10-17

## 2022-10-17 RX ORDER — PROPOFOL 10 MG/ML
VIAL (ML) INTRAVENOUS
Status: DISCONTINUED | OUTPATIENT
Start: 2022-10-17 | End: 2022-10-17

## 2022-10-17 RX ORDER — KETOROLAC TROMETHAMINE 30 MG/ML
30 INJECTION, SOLUTION INTRAMUSCULAR; INTRAVENOUS ONCE
Status: COMPLETED | OUTPATIENT
Start: 2022-10-17 | End: 2022-10-17

## 2022-10-17 RX ADMIN — CEFAZOLIN 2 G: 330 INJECTION, POWDER, FOR SOLUTION INTRAMUSCULAR; INTRAVENOUS at 07:10

## 2022-10-17 RX ADMIN — HYDROMORPHONE HYDROCHLORIDE 0.2 MG: 1 INJECTION, SOLUTION INTRAMUSCULAR; INTRAVENOUS; SUBCUTANEOUS at 11:10

## 2022-10-17 RX ADMIN — SODIUM CHLORIDE, POTASSIUM CHLORIDE, SODIUM LACTATE AND CALCIUM CHLORIDE: 600; 310; 30; 20 INJECTION, SOLUTION INTRAVENOUS at 06:10

## 2022-10-17 RX ADMIN — FENTANYL CITRATE 50 MCG: 50 INJECTION, SOLUTION INTRAMUSCULAR; INTRAVENOUS at 07:10

## 2022-10-17 RX ADMIN — HYDROMORPHONE HYDROCHLORIDE 0.2 MG: 1 INJECTION, SOLUTION INTRAMUSCULAR; INTRAVENOUS; SUBCUTANEOUS at 10:10

## 2022-10-17 RX ADMIN — KETOROLAC TROMETHAMINE 30 MG: 30 INJECTION, SOLUTION INTRAMUSCULAR; INTRAVENOUS at 11:10

## 2022-10-17 RX ADMIN — KETOROLAC TROMETHAMINE 30 MG: 30 INJECTION, SOLUTION INTRAMUSCULAR; INTRAVENOUS at 09:10

## 2022-10-17 RX ADMIN — DIPHENHYDRAMINE HYDROCHLORIDE 6.25 MG: 50 INJECTION, SOLUTION INTRAMUSCULAR; INTRAVENOUS at 11:10

## 2022-10-17 RX ADMIN — PROPOFOL 80 MG: 10 INJECTION, EMULSION INTRAVENOUS at 07:10

## 2022-10-17 RX ADMIN — HEPARIN SODIUM 5000 UNITS: 5000 INJECTION, SOLUTION INTRAVENOUS; SUBCUTANEOUS at 06:10

## 2022-10-17 RX ADMIN — METRONIDAZOLE 500 MG: 500 INJECTION, SOLUTION INTRAVENOUS at 07:10

## 2022-10-17 RX ADMIN — OXYCODONE HYDROCHLORIDE 10 MG: 5 TABLET ORAL at 05:10

## 2022-10-17 RX ADMIN — ONDANSETRON 4 MG: 2 INJECTION INTRAMUSCULAR; INTRAVENOUS at 09:10

## 2022-10-17 RX ADMIN — SUGAMMADEX 200 MG: 100 INJECTION, SOLUTION INTRAVENOUS at 09:10

## 2022-10-17 RX ADMIN — MUPIROCIN: 20 OINTMENT TOPICAL at 09:10

## 2022-10-17 RX ADMIN — ROCURONIUM BROMIDE 50 MG: 10 SOLUTION INTRAVENOUS at 07:10

## 2022-10-17 RX ADMIN — DEXAMETHASONE SODIUM PHOSPHATE 4 MG: 4 INJECTION, SOLUTION INTRA-ARTICULAR; INTRALESIONAL; INTRAMUSCULAR; INTRAVENOUS; SOFT TISSUE at 08:10

## 2022-10-17 RX ADMIN — IPRATROPIUM BROMIDE AND ALBUTEROL SULFATE 3 ML: 2.5; .5 SOLUTION RESPIRATORY (INHALATION) at 06:10

## 2022-10-17 RX ADMIN — LIDOCAINE HYDROCHLORIDE 100 MG: 20 INJECTION, SOLUTION INTRAVENOUS at 07:10

## 2022-10-17 RX ADMIN — ACETAMINOPHEN 1000 MG: 10 INJECTION, SOLUTION INTRAVENOUS at 11:10

## 2022-10-17 RX ADMIN — ROCURONIUM BROMIDE 20 MG: 10 SOLUTION INTRAVENOUS at 09:10

## 2022-10-17 RX ADMIN — ROCURONIUM BROMIDE 30 MG: 10 SOLUTION INTRAVENOUS at 08:10

## 2022-10-17 RX ADMIN — LABETALOL HYDROCHLORIDE 2.5 MG: 5 INJECTION, SOLUTION INTRAVENOUS at 12:10

## 2022-10-17 RX ADMIN — MIDAZOLAM 1 MG: 1 INJECTION INTRAMUSCULAR; INTRAVENOUS at 06:10

## 2022-10-17 RX ADMIN — PROPOFOL 120 MG: 10 INJECTION, EMULSION INTRAVENOUS at 07:10

## 2022-10-17 NOTE — PLAN OF CARE
Problem: Adult Inpatient Plan of Care  Goal: Plan of Care Review  10/17/2022 1715 by Alissa Hou RN  Outcome: Ongoing, Progressing  10/17/2022 1713 by Alissa Hou RN  Outcome: Ongoing, Progressing  Goal: Patient-Specific Goal (Individualized)  10/17/2022 1715 by Alissa Hou RN  Outcome: Ongoing, Progressing  10/17/2022 1713 by Alissa Hou RN  Outcome: Ongoing, Progressing  Goal: Absence of Hospital-Acquired Illness or Injury  Outcome: Ongoing, Progressing  Goal: Optimal Comfort and Wellbeing  10/17/2022 1715 by Alissa Hou RN  Outcome: Ongoing, Progressing  10/17/2022 1713 by Alissa Hou RN  Outcome: Ongoing, Progressing  Goal: Readiness for Transition of Care  10/17/2022 1715 by Alissa Hou RN  Outcome: Ongoing, Progressing  10/17/2022 1713 by Alissa Hou RN  Outcome: Ongoing, Progressing     Problem: Infection  Goal: Absence of Infection Signs and Symptoms  10/17/2022 1715 by Alissa Hou RN  Outcome: Ongoing, Progressing  10/17/2022 1713 by Alissa Hou RN  Outcome: Ongoing, Progressing     Problem: Fall Injury Risk  Goal: Absence of Fall and Fall-Related Injury  10/17/2022 1715 by Alissa Hou RN  Outcome: Ongoing, Progressing  10/17/2022 1713 by Alissa Hou RN  Outcome: Ongoing, Progressing     Problem: Surgical Site Infection  Goal: Absence of Infection Signs and Symptoms  Outcome: Ongoing, Progressing

## 2022-10-17 NOTE — ANESTHESIA POSTPROCEDURE EVALUATION
Anesthesia Post Evaluation    Patient: Isaías Prasad was moderately hypertensive in PACU despite adequate pain control and a functioning Serrano. Responded well to 2.5 mg labetelol    Procedure(s) Performed: Procedure(s) (LRB):  CLOSURE, COLOSTOMY (N/A)    Final Anesthesia Type: general      Patient location during evaluation: PACU  Patient participation: Yes- Able to Participate  Level of consciousness: awake and alert  Post-procedure vital signs: reviewed and stable  Pain management: adequate  Airway patency: patent    PONV status at discharge: No PONV  Anesthetic complications: no      Cardiovascular status: hypertensive  Respiratory status: unassisted  Hydration status: euvolemic  Follow-up not needed.          Vitals Value Taken Time   /94 10/17/22 1307   Temp 36.8 °C (98.3 °F) 10/17/22 1002   Pulse 79 10/17/22 1304   Resp 19 10/17/22 1304   SpO2 99 % 10/17/22 1304         No case tracking events are documented in the log.      Pain/Boston Score: Pain Rating Prior to Med Admin: 7 (10/17/2022 11:52 AM)  Pain Rating Post Med Admin: 4 (10/17/2022 12:19 PM)  Boston Score: 10 (10/17/2022 10:34 AM)

## 2022-10-17 NOTE — ANESTHESIA PROCEDURE NOTES
Intubation    Date/Time: 10/17/2022 7:28 AM  Performed by: Viji Colunga CRNA  Authorized by: Gillian Gonzalez MD     Intubation:     Induction:  Intravenous    Intubated:  Postinduction    Mask Ventilation:  Easy with oral airway    Attempts:  1    Attempted By:  CRNA    Method of Intubation:  Direct    Blade:  Mcfarland 2    Laryngeal View Grade: Grade I - full view of cords      Difficult Airway Encountered?: No      Complications:  None    Airway Device:  Oral endotracheal tube    Airway Device Size:  7.5    Style/Cuff Inflation:  Cuffed (inflated to minimal occlusive pressure)    Inflation Amount (mL):  6    Tube secured:  22    Secured at:  The lips    Placement Verified By:  Capnometry    Complicating Factors:  None    Findings Post-Intubation:  BS equal bilateral and atraumatic/condition of teeth unchanged

## 2022-10-17 NOTE — TRANSFER OF CARE
Anesthesia Transfer of Care Note    Patient: Isaías Prasad    Procedure(s) Performed: Procedure(s) (LRB):  CLOSURE, COLOSTOMY (N/A)    Patient location: PACU    Anesthesia Type: general    Transport from OR: Transported from OR on room air with adequate spontaneous ventilation    Post pain: adequate analgesia    Post assessment: no apparent anesthetic complications    Post vital signs: stable    Level of consciousness: sedated    Nausea/Vomiting: no nausea/vomiting    Complications: none    Transfer of care protocol was followed

## 2022-10-17 NOTE — BRIEF OP NOTE
Operation date:  10/17/22    Surgeon:  Dr. Barney    Assisting surgeons:  Dr. Roman, Dr. Ramsey, Dr. Armas    Preoperative diagnosis: Loop colostomy in place    Postoperative diagnosis: Same    Procedure: Colostomy closure    Anesthesia: General    Technique: See full op note    Findings: Loop colostomy in place, healthy appearing    EBL: 25ml    Specimens: Colostomy     Barbara Ramsey MD  [facility] Surgery PGY3

## 2022-10-17 NOTE — OP NOTE
Operation date:  10/17/22     Surgeon:  Dr. Barney     Assisting surgeons:  Dr. Roman, Dr. Ramsey, Dr. Armas     Preoperative diagnosis: Loop colostomy in place     Postoperative diagnosis: Same     Procedure: Colostomy closure     Anesthesia: General     Technique:     After informed consent was obtained, the patient was brought to the operating room and placed on the table in supine position. General anesthesia was administered, and the patient was prepped and draped in standard sterile fashion. A time out was performed and all were in agreement.    The skin surrounding the loop colostomy was sharply incised and carried down to the subcutaneous tissue using electrocautery. The colon was then carefully  from the surrounding soft tissue using a combination of blunt dissection and electrocautery. This was carried down to the level of the fascia. The adhesions of the colon to the fascia were then lysed as well. Once the loop colostomy was adequately mobilized, a side-to-side functional end-to-end stapled anastomosis was created using the two stapler technique with 75 TEJ blue loads. The new anastomosis was then returned to the abdomen. The fascial defect was closed with interrupted PDS sutures. The overlying skin was closed with a purse-string subcuticular monocryl, and the resulting cutaneous defect was filled with a betadine soaked wick. This was dressed with plain gauze and tape.     The patient was then awakened from anesthesia, extubated, and brought to the PACU in stable condition.     Findings: Loop colostomy in place, healthy appearing     EBL: 25ml     Specimens: Colostomy      Barbara Ramsey MD  [facility] Surgery PGY3

## 2022-10-17 NOTE — INTERVAL H&P NOTE
The patient has been examined and the H&P has been reviewed:    I concur with the findings and no changes have occurred since H&P was written.    Surgery risks, benefits and alternative options discussed and understood by patient/family.    To OR for colostomy reversal      There are no hospital problems to display for this patient.

## 2022-10-17 NOTE — NURSING
Pt lying in bed. No c/o voiced at this time. Moderated amount of serosang drainage noted to gauze drsg secured with mediport tape. No active bleeding or drainage noted. Will continue to monitor.

## 2022-10-18 LAB
ANION GAP SERPL CALC-SCNC: 12 MEQ/L
BASOPHILS # BLD AUTO: 0.05 X10(3)/MCL (ref 0–0.2)
BASOPHILS NFR BLD AUTO: 0.5 %
BUN SERPL-MCNC: 20.2 MG/DL (ref 8.4–25.7)
CALCIUM SERPL-MCNC: 8.6 MG/DL (ref 8.4–10.2)
CHLORIDE SERPL-SCNC: 103 MMOL/L (ref 98–107)
CO2 SERPL-SCNC: 23 MMOL/L (ref 22–29)
CREAT SERPL-MCNC: 1.35 MG/DL (ref 0.73–1.18)
CREAT/UREA NIT SERPL: 15
EOSINOPHIL # BLD AUTO: 0.07 X10(3)/MCL (ref 0–0.9)
EOSINOPHIL NFR BLD AUTO: 0.7 %
ERYTHROCYTE [DISTWIDTH] IN BLOOD BY AUTOMATED COUNT: 13.4 % (ref 11.5–17)
GFR SERPLBLD CREATININE-BSD FMLA CKD-EPI: >60 MLS/MIN/1.73/M2
GLUCOSE SERPL-MCNC: 99 MG/DL (ref 74–100)
HCT VFR BLD AUTO: 42.4 % (ref 42–52)
HGB BLD-MCNC: 14.3 GM/DL (ref 14–18)
IMM GRANULOCYTES # BLD AUTO: 0.04 X10(3)/MCL (ref 0–0.04)
IMM GRANULOCYTES NFR BLD AUTO: 0.4 %
LYMPHOCYTES # BLD AUTO: 2.4 X10(3)/MCL (ref 0.6–4.6)
LYMPHOCYTES NFR BLD AUTO: 22.4 %
MAGNESIUM SERPL-MCNC: 1.8 MG/DL (ref 1.6–2.6)
MCH RBC QN AUTO: 33.3 PG (ref 27–31)
MCHC RBC AUTO-ENTMCNC: 33.7 MG/DL (ref 33–36)
MCV RBC AUTO: 98.6 FL (ref 80–94)
MONOCYTES # BLD AUTO: 0.91 X10(3)/MCL (ref 0.1–1.3)
MONOCYTES NFR BLD AUTO: 8.5 %
NEUTROPHILS # BLD AUTO: 7.3 X10(3)/MCL (ref 2.1–9.2)
NEUTROPHILS NFR BLD AUTO: 67.5 %
NRBC BLD AUTO-RTO: 0 %
PHOSPHATE SERPL-MCNC: 3.7 MG/DL (ref 2.3–4.7)
PLATELET # BLD AUTO: 290 X10(3)/MCL (ref 130–400)
PMV BLD AUTO: 9.7 FL (ref 7.4–10.4)
POTASSIUM SERPL-SCNC: 4.7 MMOL/L (ref 3.5–5.1)
RBC # BLD AUTO: 4.3 X10(6)/MCL (ref 4.7–6.1)
SODIUM SERPL-SCNC: 138 MMOL/L (ref 136–145)
WBC # SPEC AUTO: 10.7 X10(3)/MCL (ref 4.5–11.5)

## 2022-10-18 PROCEDURE — 11000001 HC ACUTE MED/SURG PRIVATE ROOM

## 2022-10-18 PROCEDURE — 99900035 HC TECH TIME PER 15 MIN (STAT)

## 2022-10-18 PROCEDURE — 36415 COLL VENOUS BLD VENIPUNCTURE: CPT | Performed by: STUDENT IN AN ORGANIZED HEALTH CARE EDUCATION/TRAINING PROGRAM

## 2022-10-18 PROCEDURE — 25000003 PHARM REV CODE 250: Performed by: STUDENT IN AN ORGANIZED HEALTH CARE EDUCATION/TRAINING PROGRAM

## 2022-10-18 PROCEDURE — 83735 ASSAY OF MAGNESIUM: CPT | Performed by: STUDENT IN AN ORGANIZED HEALTH CARE EDUCATION/TRAINING PROGRAM

## 2022-10-18 PROCEDURE — 84100 ASSAY OF PHOSPHORUS: CPT | Performed by: STUDENT IN AN ORGANIZED HEALTH CARE EDUCATION/TRAINING PROGRAM

## 2022-10-18 PROCEDURE — 63600175 PHARM REV CODE 636 W HCPCS: Performed by: STUDENT IN AN ORGANIZED HEALTH CARE EDUCATION/TRAINING PROGRAM

## 2022-10-18 PROCEDURE — 85025 COMPLETE CBC W/AUTO DIFF WBC: CPT | Performed by: STUDENT IN AN ORGANIZED HEALTH CARE EDUCATION/TRAINING PROGRAM

## 2022-10-18 PROCEDURE — 94760 N-INVAS EAR/PLS OXIMETRY 1: CPT

## 2022-10-18 PROCEDURE — 94799 UNLISTED PULMONARY SVC/PX: CPT

## 2022-10-18 PROCEDURE — 80048 BASIC METABOLIC PNL TOTAL CA: CPT | Performed by: STUDENT IN AN ORGANIZED HEALTH CARE EDUCATION/TRAINING PROGRAM

## 2022-10-18 PROCEDURE — 94761 N-INVAS EAR/PLS OXIMETRY MLT: CPT

## 2022-10-18 RX ADMIN — MUPIROCIN: 20 OINTMENT TOPICAL at 08:10

## 2022-10-18 RX ADMIN — SODIUM CHLORIDE, POTASSIUM CHLORIDE, SODIUM LACTATE AND CALCIUM CHLORIDE: 600; 310; 30; 20 INJECTION, SOLUTION INTRAVENOUS at 08:10

## 2022-10-18 RX ADMIN — OXYCODONE HYDROCHLORIDE 10 MG: 5 TABLET ORAL at 08:10

## 2022-10-18 RX ADMIN — SODIUM CHLORIDE, POTASSIUM CHLORIDE, SODIUM LACTATE AND CALCIUM CHLORIDE: 600; 310; 30; 20 INJECTION, SOLUTION INTRAVENOUS at 02:10

## 2022-10-18 RX ADMIN — ENOXAPARIN SODIUM 40 MG: 40 INJECTION SUBCUTANEOUS at 08:10

## 2022-10-18 RX ADMIN — OXYCODONE HYDROCHLORIDE 10 MG: 5 TABLET ORAL at 12:10

## 2022-10-18 NOTE — MEDICAL/APP STUDENT
[facility] General Surgery Progress Note    Isaías Prasad   [id number]     Subjective  Isaías Prasad 57 y.o. male with PMHx of HTN and TIA, POD #1 s/p colostomy reversal. Nurse reports NAOE. Patient states pain is adequately managed with pain medication. Patient is able to ambulate OOB, on clear diet, had a bloody BM, and urinating adequately. Patient reports no n/v/f/chills/SOB/CP.    Objective  Temp:  [97.5 °F (36.4 °C)-98.6 °F (37 °C)] 98.6 °F (37 °C)  Pulse:  [69-92] 89  Resp:  [16-20] 18  SpO2:  [94 %-100 %] 94 %  BP: (121-170)/() 129/83    I/O  300cc urine output last shift    Physical Exam  Gen: NAD, AAOx3  CV: extremities wwp, regular rate, palpable radials, normal S1 and S2 sounds  Pulm: nonlabored, no increased wob, equal chest rise b/l, CTAB  Abd: s/nt/nd, appropriate tenderness to palpation on left abdominal colostomy reversal site. Incision site packing was without abnormal discharge and mostly betadine.   : urethral catheter in place      Labs:      Recent Labs   Lab 10/18/22  0341   WBC 10.7   HGB 14.3   HCT 42.4         CHLORIDE 103   CO2 23   BUN 20.2   CREATININE 1.35*   GLUCOSE 99   CALCIUM 8.6   MG 1.80   PHOS 3.7           Path:  Specimen from colostomy reversal in process collected at 10/17/2022    A/P:  Isaías Prasad 57 y.o. male with PMHx of HTN and TIA, POD #1 s/p colostomy reversal.     -advance to full liquid diet  -waiting for return full bowel function  -PRN pain medication  -discontinue and remove lutz catheter  -OOB, encourage incentive spirometry      Irene Wilhelm  [facility] MS3

## 2022-10-18 NOTE — PLAN OF CARE
Problem: Adult Inpatient Plan of Care  Goal: Plan of Care Review  Outcome: Ongoing, Progressing  Goal: Patient-Specific Goal (Individualized)  Outcome: Ongoing, Progressing  Goal: Absence of Hospital-Acquired Illness or Injury  Outcome: Ongoing, Progressing  Goal: Optimal Comfort and Wellbeing  Outcome: Ongoing, Progressing  Goal: Readiness for Transition of Care  Outcome: Ongoing, Progressing     Problem: Infection  Goal: Absence of Infection Signs and Symptoms  Outcome: Ongoing, Progressing     Problem: Fall Injury Risk  Goal: Absence of Fall and Fall-Related Injury  Outcome: Ongoing, Progressing     Problem: Surgical Site Infection  Goal: Absence of Infection Signs and Symptoms  Outcome: Ongoing, Progressing

## 2022-10-18 NOTE — PROGRESS NOTES
"Inpatient Nutrition Evaluation    Admit Date: 10/17/2022   Total duration of encounter: 1 day    Nutrition Recommendation/Prescription     Continue to advance diet as tolerated -- goal diet GI Soft  Will send Boost Original BID, to provide 240 kcals and 10g pro per serving  Monitor po intake, wt, labs    Nutrition Assessment     Chart Review    Reason Seen: continuous nutrition monitoring    Diagnosis: s/p colostomy reversal    Relevant Medical History: HTN and TIA    Nutrition-Related Medications: LR @ 125mL/hr    Nutrition-Related Labs: 10/18-Creat 1.35, GFR >60      Diet Order: Diet full liquid  Oral Supplement Order: none at this time  Appetite/Oral Intake: good/% of meals  Factors Affecting Nutritional Intake: diarrhea  Food/Taoism/Cultural Preferences: none reported    Skin Integrity: incision  Wound(s):   N/A    Comments  10/18: Pt reports tolerating full liquid diet well. Pt denies any N/V; reports loose bloody stools. Pt reports good appetite and no recent wt loss pta; states -195#. Pt ok to add strawberry Boost Original BID.      Anthropometrics    Height: 6' 2" (188 cm) Height Method: Stated  Last Weight: 87.2 kg (192 lb 3.9 oz) (10/17/22 1528) Weight Method: Bed Scale  BMI (Calculated): 24.7  BMI Classification: normal (BMI 18.5-24.9)        Ideal Body Weight (IBW), Male: 190 lb     % Ideal Body Weight, Male (lb): 101.18 %                 Usual Body Weight (UBW), k.36 kg (-195# per pt)  % Usual Body Weight: 101.18     Usual Weight Provided By: patient    Wt Readings from Last 5 Encounters:   10/17/22 87.2 kg (192 lb 3.9 oz)   10/14/22 87.3 kg (192 lb 7.4 oz)   10/04/22 89.4 kg (197 lb 1.5 oz)   22 84.8 kg (187 lb)   22 83.9 kg (185 lb)     Weight Change(s) Since Admission:  Admit Weight: 87.3 kg (192 lb 7.4 oz) (10/17/22 0504)  10/18: No new wt    Patient Education    Not applicable.    Monitoring & Evaluation     Dietitian will monitor food and beverage intake, " weight, electrolyte/renal panel, and gastrointestinal profile.  Nutrition Risk/Follow-Up: low (follow-up in 5-7 days)  Patients assigned 'low nutrition risk' status do not qualify for a full nutritional assessment but will be monitored and re-evaluated in a 5-7 day time period.

## 2022-10-18 NOTE — PROGRESS NOTES
[facility] General Surgery Progress Note    Isaías Prasad   [id number]     Subjective  POD1 colostomy reversal. No acute events overnight, VSS, Tmax 98.6. Tolerating clear liquid diet well. Reports bm but states it was mostly blood. Denies n/v, f/c, abdominal pain.    Objective  Temp:  [97.5 °F (36.4 °C)-98.6 °F (37 °C)] 98.6 °F (37 °C)  Pulse:  [69-92] 89  Resp:  [16-20] 18  SpO2:  [94 %-100 %] 94 %  BP: (121-170)/() 129/83    I/O this shift:  In: 1740 [P.O.:240; I.V.:1500]  Out: -   I/O last 3 completed shifts:  In: 1358 [P.O.:358; I.V.:900; IV Piggyback:100]  Out: 150 [Urine:150]    Physical Exam  Gen: NAD, AAOx3  CV: extremities wwp, regular rate, palpable radials  Pulm: nonlabored, no increased wob, equal chest rise b/l   Abd: appropriately tender to palpation along ostomy closure site. No diffuse tenderness or signs of peritonitis.  Wounds: Ostomy closure under bandage - left in place. Iodine strikethrough but not blood.    Labs:      Recent Labs   Lab 10/18/22  0341   WBC 10.7   HGB 14.3   HCT 42.4         CHLORIDE 103   CO2 23   BUN 20.2   CREATININE 1.35*   GLUCOSE 99   CALCIUM 8.6   MG 1.80   PHOS 3.7     Path:  Intra op surgical specimen pending    A/P:    Isaías Prasad is a 57 y.o. male who is POD 1 from colostomy reversal after a prior ex-lap for foreign body removal, anterior rectotomy, and diverting loop colostomy 5/18    - Tolerating clear liquid diet well, will advance to full liquid diet  - Pain controlled  - Encourage ambulation    Mic Armas MD  \[facility] [place] [facility]\"" General Surgery, PGY-1

## 2022-10-19 VITALS
TEMPERATURE: 98 F | SYSTOLIC BLOOD PRESSURE: 143 MMHG | HEART RATE: 88 BPM | WEIGHT: 192.25 LBS | DIASTOLIC BLOOD PRESSURE: 93 MMHG | RESPIRATION RATE: 18 BRPM | HEIGHT: 74 IN | OXYGEN SATURATION: 100 % | BODY MASS INDEX: 24.67 KG/M2

## 2022-10-19 PROBLEM — Z98.890 S/P COLOSTOMY TAKEDOWN: Status: ACTIVE | Noted: 2022-10-19

## 2022-10-19 LAB
ANION GAP SERPL CALC-SCNC: 13 MEQ/L
BASOPHILS # BLD AUTO: 0.09 X10(3)/MCL (ref 0–0.2)
BASOPHILS NFR BLD AUTO: 0.9 %
BUN SERPL-MCNC: 18.3 MG/DL (ref 8.4–25.7)
CALCIUM SERPL-MCNC: 8.3 MG/DL (ref 8.4–10.2)
CHLORIDE SERPL-SCNC: 104 MMOL/L (ref 98–107)
CO2 SERPL-SCNC: 22 MMOL/L (ref 22–29)
CREAT SERPL-MCNC: 1.19 MG/DL (ref 0.73–1.18)
CREAT/UREA NIT SERPL: 15
EOSINOPHIL # BLD AUTO: 0.2 X10(3)/MCL (ref 0–0.9)
EOSINOPHIL NFR BLD AUTO: 2 %
ERYTHROCYTE [DISTWIDTH] IN BLOOD BY AUTOMATED COUNT: 13.5 % (ref 11.5–17)
ESTROGEN SERPL-MCNC: NORMAL PG/ML
GFR SERPLBLD CREATININE-BSD FMLA CKD-EPI: >60 MLS/MIN/1.73/M2
GLUCOSE SERPL-MCNC: 101 MG/DL (ref 74–100)
HCT VFR BLD AUTO: 40.9 % (ref 42–52)
HGB BLD-MCNC: 13.7 GM/DL (ref 14–18)
IMM GRANULOCYTES # BLD AUTO: 0.03 X10(3)/MCL (ref 0–0.04)
IMM GRANULOCYTES NFR BLD AUTO: 0.3 %
INSULIN SERPL-ACNC: NORMAL U[IU]/ML
LAB AP CLINICAL INFORMATION: NORMAL
LAB AP GROSS DESCRIPTION: NORMAL
LAB AP REPORT FOOTNOTES: NORMAL
LYMPHOCYTES # BLD AUTO: 2.84 X10(3)/MCL (ref 0.6–4.6)
LYMPHOCYTES NFR BLD AUTO: 29.1 %
MAGNESIUM SERPL-MCNC: 2.1 MG/DL (ref 1.6–2.6)
MCH RBC QN AUTO: 33.3 PG (ref 27–31)
MCHC RBC AUTO-ENTMCNC: 33.5 MG/DL (ref 33–36)
MCV RBC AUTO: 99.3 FL (ref 80–94)
MONOCYTES # BLD AUTO: 0.84 X10(3)/MCL (ref 0.1–1.3)
MONOCYTES NFR BLD AUTO: 8.6 %
NEUTROPHILS # BLD AUTO: 5.8 X10(3)/MCL (ref 2.1–9.2)
NEUTROPHILS NFR BLD AUTO: 59.1 %
NRBC BLD AUTO-RTO: 0 %
PHOSPHATE SERPL-MCNC: 2.6 MG/DL (ref 2.3–4.7)
PLATELET # BLD AUTO: 260 X10(3)/MCL (ref 130–400)
PMV BLD AUTO: 9.9 FL (ref 7.4–10.4)
POTASSIUM SERPL-SCNC: 4.6 MMOL/L (ref 3.5–5.1)
RBC # BLD AUTO: 4.12 X10(6)/MCL (ref 4.7–6.1)
SODIUM SERPL-SCNC: 139 MMOL/L (ref 136–145)
T3RU NFR SERPL: NORMAL %
WBC # SPEC AUTO: 9.8 X10(3)/MCL (ref 4.5–11.5)

## 2022-10-19 PROCEDURE — 94761 N-INVAS EAR/PLS OXIMETRY MLT: CPT

## 2022-10-19 PROCEDURE — 36415 COLL VENOUS BLD VENIPUNCTURE: CPT | Performed by: STUDENT IN AN ORGANIZED HEALTH CARE EDUCATION/TRAINING PROGRAM

## 2022-10-19 PROCEDURE — 99900035 HC TECH TIME PER 15 MIN (STAT)

## 2022-10-19 PROCEDURE — 80048 BASIC METABOLIC PNL TOTAL CA: CPT | Performed by: STUDENT IN AN ORGANIZED HEALTH CARE EDUCATION/TRAINING PROGRAM

## 2022-10-19 PROCEDURE — 83735 ASSAY OF MAGNESIUM: CPT | Performed by: STUDENT IN AN ORGANIZED HEALTH CARE EDUCATION/TRAINING PROGRAM

## 2022-10-19 PROCEDURE — 84100 ASSAY OF PHOSPHORUS: CPT | Performed by: STUDENT IN AN ORGANIZED HEALTH CARE EDUCATION/TRAINING PROGRAM

## 2022-10-19 PROCEDURE — 85025 COMPLETE CBC W/AUTO DIFF WBC: CPT | Performed by: STUDENT IN AN ORGANIZED HEALTH CARE EDUCATION/TRAINING PROGRAM

## 2022-10-19 RX ORDER — POLYETHYLENE GLYCOL 3350 17 G/17G
17 POWDER, FOR SOLUTION ORAL DAILY
Qty: 20 EACH | Refills: 0 | Status: ON HOLD | OUTPATIENT
Start: 2022-10-19 | End: 2022-12-15

## 2022-10-19 RX ORDER — HYDROCODONE BITARTRATE AND ACETAMINOPHEN 5; 325 MG/1; MG/1
1 TABLET ORAL EVERY 6 HOURS PRN
Qty: 15 TABLET | Refills: 0 | Status: ON HOLD | OUTPATIENT
Start: 2022-10-19 | End: 2022-12-15

## 2022-10-19 NOTE — NURSING
Pt was given discharge instructions and verbalized understanding. I then escorted pt down to family vehicle.

## 2022-10-19 NOTE — HPI
Isaías Prasad 57 y.o. -year-old male who presented to the hospital 10/17 for colostomy takedown as an outpatient. Barium enema for takedown w/u completed 8/23/2022. Patient previously smoked 0.5 ppd cigarettes but was adamant about quitting prior to surgery. Also smokes marijuana recreationally. Does not take any medications. Denies f/c/n/v/CP/SOB.

## 2022-10-19 NOTE — DISCHARGE SUMMARY
[facility]  General Surgery  Discharge Summary      Patient Name: Isaías Prasad  MRN: [id number]  Admission Date: 10/17/2022  Hospital Length of Stay: 2 days  Discharge Date and Time:  10/19/2022 9:49 AM  Attending Physician: Jesse Barney MD   Discharging Provider: Mic Armas MD  Primary Care Provider: Primary Doctor No    HPI:   Isaías Prasad 57 y.o. -year-old male who presented to the hospital 10/17 for colostomy takedown as an outpatient. Barium enema for takedown w/u completed 8/23/2022. Patient previously smoked 0.5 ppd cigarettes but was adamant about quitting prior to surgery. Also smokes marijuana recreationally. Does not take any medications. Denies f/c/n/v/CP/SOB.        Procedure(s) (LRB):  CLOSURE, COLOSTOMY (N/A)      Indwelling Lines/Drains at time of discharge:   Lines/Drains/Airways     Drain  Duration                Colostomy 05/18/22 1600 Loop  days              Hospital Course: Patient presented as an outpatient 10/17/2022 for colostomy takedown. Tolerated procedure well without complication and diet was advanced as scheduled. Patient tolerated regular diet well and discharged in stable condition 10/19.      Goals of Care Treatment Preferences:  Code Status: Full Code      Consults: n/a    Pending Diagnostic Studies:     None        Final Active Diagnoses:    Diagnosis Date Noted POA    PRINCIPAL PROBLEM:  S/P colostomy takedown [Z98.890] 10/19/2022 Not Applicable      Problems Resolved During this Admission:      Discharged Condition: stable    Disposition: Home or Self Care    Follow Up: Clinic 2 weeks    Patient Instructions:      Diet Adult Regular     Lifting restrictions   Order Comments: No lifting >10lbs for 6 weeks     Notify your health care provider if you experience any of the following:  temperature >100.4     Notify your health care provider if you experience any of the following:  persistent nausea and vomiting or diarrhea      Notify your health care provider if you experience any of the following:  severe uncontrolled pain     Notify your health care provider if you experience any of the following:  redness, tenderness, or signs of infection (pain, swelling, redness, odor or green/yellow discharge around incision site)     Change dressing (specify)   Order Comments: Dressing change: daily. Okay to shower after 24 hours     Remove dressing in 24 hours     Medications:  Reconciled Home Medications:      Medication List      START taking these medications    HYDROcodone-acetaminophen 5-325 mg per tablet  Commonly known as: NORCO  Take 1 tablet by mouth every 6 (six) hours as needed for Pain.     polyethylene glycol 17 gram Pwpk  Commonly known as: GLYCOLAX  Take 17 g by mouth once daily.        STOP taking these medications    metroNIDAZOLE 500 MG tablet  Commonly known as: FLAGYL     neomycin 500 mg Tab  Commonly known as: MYCIFRADIN          Time spent on the discharge of patient: 15 minutes    Mic Armas MD  General Surgery  [facility]

## 2022-10-19 NOTE — MEDICAL/APP STUDENT
[facility] General Surgery Progress Note    Isaías Prasad   [id number]     Subjective  Isaías Prasad 57 y.o. male with PMHx of HTN and TIA, POD #2 s/p colostomy reversal. Nurse reports NAOE. Patient states pain is adequately managed with pain medication. Patient is able to ambulate OOB, tolerating clear diet,BM with no blood in stool, and urinating adequately. Urethral catheter removed on 10/18. Patient experience some right hand edema due to dislodged IV, reports pain on right hand. Patient reports no n/v/f/chills/SOB/CP.    Objective  Temp:  [97.5 °F (36.4 °C)-98 °F (36.7 °C)] 97.5 °F (36.4 °C)  Pulse:  [89-97] 89  Resp:  [18] 18  SpO2:  [93 %-99 %] 99 %  BP: (121-135)/(76-97) 131/89    No intake/output data recorded.  I/O last 3 completed shifts:  In: 5198 [P.O.:1198; I.V.:3900; IV Piggyback:100]  Out: 625 [Urine:625]    Physical Exam  Gen: NAD, AAOx3  CV: extremities wwp, regular rate, palpable radials, normal S1 and S2 sounds  Pulm: nonlabored, no increased wob, equal chest rise b/l, CTAB  Abd: s/nt/nd, appropriate tenderness to palpation on left abdominal colostomy reversal site. Incision site packing was without abnormal discharge, incision was dry, no discharge, and well healing  Extremity: right hand swelling      Labs:   Recent Labs     10/18/22  0341 10/19/22  0350   WBC 10.7 9.8   HGB 14.3 13.7*   HCT 42.4 40.9*    260     --    CHLORIDE 103  --    CO2 23  --    BUN 20.2  --    CREATININE 1.35*  --    GLUCOSE 99  --    CALCIUM 8.6  --    MG 1.80  --    PHOS 3.7  --       Path:  Pathology results pending on colon specimen from 10/17    A/P:    Isaías Prasad 57 y.o. male with PMHx of HTN and TIA, POD #2 s/p colostomy reversal.     -tolerating full clear diet, advance to regular diet   -Discharge once tolerating regular diet        Dennes Wilhelm  [facility] MS3
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
## 2022-10-19 NOTE — PROGRESS NOTES
[facility] Progress Note    Isaías Prasad   [id number]     Subjective  IV seemingly infiltrated overnight causing edema throughout right hand. Per nursing staff hand turned purple. Instructed to elevate hand, stop fluids, and remove IV. On exam no signs of compartment syndrome or emergency. VSS, Tmax 98.0. Denies n/v, f/c, sensation changes to right hand, pain with ROM. Reports abdominal pain around prior ostomy site. Small BM overnight that was not bloody.    Objective  Temp:  [97.5 °F (36.4 °C)-98 °F (36.7 °C)] 97.5 °F (36.4 °C)  Pulse:  [89-97] 89  Resp:  [18] 18  SpO2:  [93 %-99 %] 99 %  BP: (121-135)/(76-97) 131/89    No intake/output data recorded.  I/O last 3 completed shifts:  In: 5198 [P.O.:1198; I.V.:3900; IV Piggyback:100]  Out: 625 [Urine:625]    Physical Exam  Gen: NAD, AAOx3  CV: regular rate, palpable radials  Pulm: nonlabored, no increased wob, equal chest rise b/l   Abd: s/nd appropriate tenderness around prior ostomy site. Remaining abdomen nontender to palpation  Wounds: purse-string stitched ostomy site remains open with iodine-gauze wick in place.  Extremities: right hand cool to touch, no pain with active or passive ROM, no pain with palpation or squeeze. 2+ radial pulse. Brisk capillary refill    Labs:      Recent Labs   Lab 10/18/22  0341   WBC 10.7   HGB 14.3   HCT 42.4         CHLORIDE 103   CO2 23   BUN 20.2   CREATININE 1.35*   GLUCOSE 99   CALCIUM 8.6   MG 1.80   PHOS 3.7     Path:  Surgical path pending 10/17/2022    A/P:    Isaías Prasad is a 57 y.o. male who is POD 1 from colostomy reversal after a prior ex-lap for foreign body removal, anterior rectotomy, and diverting loop colostomy 5/18    - Tolerating full liquids well, will move to full diet today  - Discharge likely today pending tolerance of regular diet  - Keep hand elevated as much as possible throughout the day     Mic Armas MD  [facility], PGY-1

## 2022-10-19 NOTE — HOSPITAL COURSE
Patient presented as an outpatient 10/17/2022 for colostomy takedown. Tolerated procedure well without complication and diet was advanced as scheduled. Patient tolerated regular diet well and discharged in stable condition 10/19.

## 2022-12-14 ENCOUNTER — HOSPITAL ENCOUNTER (OUTPATIENT)
Facility: HOSPITAL | Age: 57
LOS: 1 days | Discharge: HOME | End: 2022-12-15
Attending: FAMILY MEDICINE | Admitting: INTERNAL MEDICINE
Payer: MEDICAID

## 2022-12-14 DIAGNOSIS — F15.10 METHAMPHETAMINE ABUSE: ICD-10-CM

## 2022-12-14 DIAGNOSIS — I50.9 ACUTE CONGESTIVE HEART FAILURE: ICD-10-CM

## 2022-12-14 DIAGNOSIS — R06.00 DYSPNEA: ICD-10-CM

## 2022-12-14 DIAGNOSIS — I50.21 ACUTE SYSTOLIC CONGESTIVE HEART FAILURE: ICD-10-CM

## 2022-12-14 DIAGNOSIS — I50.9 ACUTE CONGESTIVE HEART FAILURE, UNSPECIFIED HEART FAILURE TYPE: Primary | ICD-10-CM

## 2022-12-14 LAB
ALBUMIN SERPL-MCNC: 3.7 GM/DL (ref 3.5–5)
ALBUMIN SERPL-MCNC: 3.8 GM/DL (ref 3.5–5)
ALBUMIN/GLOB SERPL: 1.2 RATIO (ref 1.1–2)
ALBUMIN/GLOB SERPL: 1.4 RATIO (ref 1.1–2)
ALP SERPL-CCNC: 147 UNIT/L (ref 40–150)
ALP SERPL-CCNC: 161 UNIT/L (ref 40–150)
ALT SERPL-CCNC: 59 UNIT/L (ref 0–55)
ALT SERPL-CCNC: 61 UNIT/L (ref 0–55)
AMPHET UR QL SCN: POSITIVE
ANION GAP SERPL CALC-SCNC: 14 MEQ/L
APPEARANCE UR: CLEAR
AST SERPL-CCNC: 49 UNIT/L (ref 5–34)
AST SERPL-CCNC: 51 UNIT/L (ref 5–34)
AV INDEX (PROSTH): 0.8
AV MEAN GRADIENT: 2 MMHG
AV PEAK GRADIENT: 4 MMHG
AV VALVE AREA: 2.64 CM2
AV VELOCITY RATIO: 0.81
BACTERIA #/AREA URNS AUTO: ABNORMAL /HPF
BARBITURATE SCN PRESENT UR: NEGATIVE
BASOPHILS # BLD AUTO: 0.06 X10(3)/MCL (ref 0–0.2)
BASOPHILS # BLD AUTO: 0.1 X10(3)/MCL (ref 0–0.2)
BASOPHILS NFR BLD AUTO: 0.8 %
BASOPHILS NFR BLD AUTO: 1.2 %
BENZODIAZ UR QL SCN: POSITIVE
BILIRUB UR QL STRIP.AUTO: NEGATIVE MG/DL
BILIRUBIN DIRECT+TOT PNL SERPL-MCNC: 0.7 MG/DL
BILIRUBIN DIRECT+TOT PNL SERPL-MCNC: 0.9 MG/DL
BNP BLD-MCNC: 1155.2 PG/ML
BSA FOR ECHO PROCEDURE: 2.08 M2
BUN SERPL-MCNC: 23.3 MG/DL (ref 8.4–25.7)
BUN SERPL-MCNC: 23.3 MG/DL (ref 8.4–25.7)
BUN SERPL-MCNC: 26.2 MG/DL (ref 8.4–25.7)
CALCIUM SERPL-MCNC: 10 MG/DL (ref 8.4–10.2)
CALCIUM SERPL-MCNC: 9 MG/DL (ref 8.4–10.2)
CALCIUM SERPL-MCNC: 9.1 MG/DL (ref 8.4–10.2)
CANNABINOIDS UR QL SCN: POSITIVE
CHLORIDE SERPL-SCNC: 104 MMOL/L (ref 98–107)
CHLORIDE SERPL-SCNC: 107 MMOL/L (ref 98–107)
CHLORIDE SERPL-SCNC: 99 MMOL/L (ref 98–107)
CHOLEST SERPL-MCNC: 157 MG/DL
CHOLEST/HDLC SERPL: 4 {RATIO} (ref 0–5)
CK MB SERPL-MCNC: 5.1 NG/ML
CK SERPL-CCNC: 200 U/L (ref 30–200)
CO2 SERPL-SCNC: 21 MMOL/L (ref 22–29)
CO2 SERPL-SCNC: 22 MMOL/L (ref 22–29)
CO2 SERPL-SCNC: 28 MMOL/L (ref 22–29)
COCAINE UR QL SCN: NEGATIVE
COLOR UR AUTO: ABNORMAL
CREAT SERPL-MCNC: 1.17 MG/DL (ref 0.73–1.18)
CREAT SERPL-MCNC: 1.32 MG/DL (ref 0.73–1.18)
CREAT SERPL-MCNC: 1.5 MG/DL (ref 0.73–1.18)
CREAT/UREA NIT SERPL: 16
CV ECHO LV RWT: 0.42 CM
D DIMER PPP IA.FEU-MCNC: 0.94 UG/ML FEU (ref 0–0.5)
DOP CALC AO PEAK VEL: 0.95 M/S
DOP CALC AO VTI: 16 CM
DOP CALC LVOT AREA: 3.3 CM2
DOP CALC LVOT DIAMETER: 2.05 CM
DOP CALC LVOT PEAK VEL: 0.77 M/S
DOP CALC LVOT STROKE VOLUME: 42.23 CM3
DOP CALC MV VTI: 28.9 CM
DOP CALCLVOT PEAK VEL VTI: 12.8 CM
E WAVE DECELERATION TIME: 126.95 MSEC
E/A RATIO: 3.39
ECHO LV POSTERIOR WALL: 1.23 CM (ref 0.6–1.1)
EJECTION FRACTION: 20 %
EOSINOPHIL # BLD AUTO: 0.25 X10(3)/MCL (ref 0–0.9)
EOSINOPHIL # BLD AUTO: 0.25 X10(3)/MCL (ref 0–0.9)
EOSINOPHIL NFR BLD AUTO: 3.1 %
EOSINOPHIL NFR BLD AUTO: 3.3 %
ERYTHROCYTE [DISTWIDTH] IN BLOOD BY AUTOMATED COUNT: 12.6 % (ref 11.5–17)
ERYTHROCYTE [DISTWIDTH] IN BLOOD BY AUTOMATED COUNT: 12.7 % (ref 11.5–17)
ETHANOL SERPL-MCNC: <10 MG/DL
FENTANYL UR QL SCN: NEGATIVE
FLUAV AG UPPER RESP QL IA.RAPID: NOT DETECTED
FLUBV AG UPPER RESP QL IA.RAPID: NOT DETECTED
FRACTIONAL SHORTENING: 9 % (ref 28–44)
GFR SERPLBLD CREATININE-BSD FMLA CKD-EPI: 54 MLS/MIN/1.73/M2
GFR SERPLBLD CREATININE-BSD FMLA CKD-EPI: >60 MLS/MIN/1.73/M2
GFR SERPLBLD CREATININE-BSD FMLA CKD-EPI: >60 MLS/MIN/1.73/M2
GLOBULIN SER-MCNC: 2.7 GM/DL (ref 2.4–3.5)
GLOBULIN SER-MCNC: 3.3 GM/DL (ref 2.4–3.5)
GLUCOSE SERPL-MCNC: 113 MG/DL (ref 74–100)
GLUCOSE SERPL-MCNC: 117 MG/DL (ref 74–100)
GLUCOSE SERPL-MCNC: 90 MG/DL (ref 74–100)
GLUCOSE UR QL STRIP.AUTO: NORMAL MG/DL
HCT VFR BLD AUTO: 41.4 % (ref 42–52)
HCT VFR BLD AUTO: 43.9 % (ref 42–52)
HDLC SERPL-MCNC: 42 MG/DL (ref 35–60)
HGB BLD-MCNC: 14.3 GM/DL (ref 14–18)
HGB BLD-MCNC: 14.9 GM/DL (ref 14–18)
HR MV ECHO: 81 BPM
HYALINE CASTS #/AREA URNS LPF: ABNORMAL /LPF
IMM GRANULOCYTES # BLD AUTO: 0.02 X10(3)/MCL (ref 0–0.04)
IMM GRANULOCYTES # BLD AUTO: 0.03 X10(3)/MCL (ref 0–0.04)
IMM GRANULOCYTES NFR BLD AUTO: 0.3 %
IMM GRANULOCYTES NFR BLD AUTO: 0.4 %
INTERVENTRICULAR SEPTUM: 1.35 CM (ref 0.6–1.1)
KETONES UR QL STRIP.AUTO: NEGATIVE MG/DL
LDLC SERPL CALC-MCNC: 99 MG/DL (ref 50–140)
LEFT ATRIUM SIZE: 4.09 CM
LEFT ATRIUM VOLUME INDEX MOD: 36.1 ML/M2
LEFT ATRIUM VOLUME MOD: 75.36 CM3
LEFT INTERNAL DIMENSION IN SYSTOLE: 5.25 CM (ref 2.1–4)
LEFT VENTRICLE DIASTOLIC VOLUME INDEX: 79.34 ML/M2
LEFT VENTRICLE DIASTOLIC VOLUME: 165.82 ML
LEFT VENTRICLE MASS INDEX: 156 G/M2
LEFT VENTRICLE SYSTOLIC VOLUME INDEX: 63.3 ML/M2
LEFT VENTRICLE SYSTOLIC VOLUME: 132.36 ML
LEFT VENTRICULAR INTERNAL DIMENSION IN DIASTOLE: 5.79 CM (ref 3.5–6)
LEFT VENTRICULAR MASS: 326.97 G
LEUKOCYTE ESTERASE UR QL STRIP.AUTO: NEGATIVE UNIT/L
LV LATERAL E/E' RATIO: 17.33 M/S
LVOT MG: 1.16 MMHG
LVOT MV: 0.5 CM/S
LYMPHOCYTES # BLD AUTO: 2.83 X10(3)/MCL (ref 0.6–4.6)
LYMPHOCYTES # BLD AUTO: 2.92 X10(3)/MCL (ref 0.6–4.6)
LYMPHOCYTES NFR BLD AUTO: 35.9 %
LYMPHOCYTES NFR BLD AUTO: 37.6 %
MCH RBC QN AUTO: 31.7 PG (ref 27–31)
MCH RBC QN AUTO: 32.3 PG (ref 27–31)
MCHC RBC AUTO-ENTMCNC: 33.9 MG/DL (ref 33–36)
MCHC RBC AUTO-ENTMCNC: 34.5 MG/DL (ref 33–36)
MCV RBC AUTO: 93.4 FL (ref 80–94)
MCV RBC AUTO: 93.5 FL (ref 80–94)
MDMA UR QL SCN: NEGATIVE
MONOCYTES # BLD AUTO: 0.61 X10(3)/MCL (ref 0.1–1.3)
MONOCYTES # BLD AUTO: 0.66 X10(3)/MCL (ref 0.1–1.3)
MONOCYTES NFR BLD AUTO: 8.1 %
MONOCYTES NFR BLD AUTO: 8.1 %
MUCOUS THREADS URNS QL MICRO: ABNORMAL /LPF
MV MEAN GRADIENT: 4 MMHG
MV PEAK A VEL: 0.46 M/S
MV PEAK E VEL: 1.56 M/S
MV PEAK GRADIENT: 8 MMHG
MV STENOSIS PRESSURE HALF TIME: 36.82 MS
MV VALVE AREA BY CONTINUITY EQUATION: 1.46 CM2
MV VALVE AREA P 1/2 METHOD: 5.98 CM2
NEUTROPHILS # BLD AUTO: 3.8 X10(3)/MCL (ref 2.1–9.2)
NEUTROPHILS # BLD AUTO: 4.2 X10(3)/MCL (ref 2.1–9.2)
NEUTROPHILS NFR BLD AUTO: 49.9 %
NEUTROPHILS NFR BLD AUTO: 51.3 %
NITRITE UR QL STRIP.AUTO: NEGATIVE
NRBC BLD AUTO-RTO: 0 %
NRBC BLD AUTO-RTO: 0 %
OPIATES UR QL SCN: NEGATIVE
PCP UR QL: NEGATIVE
PH UR STRIP.AUTO: 7 [PH]
PH UR: 7 [PH] (ref 3–11)
PISA MRMAX VEL: 5.11 M/S
PISA TR MAX VEL: 3.29 M/S
PLATELET # BLD AUTO: 291 X10(3)/MCL (ref 130–400)
PLATELET # BLD AUTO: 321 X10(3)/MCL (ref 130–400)
PMV BLD AUTO: 9.2 FL (ref 7.4–10.4)
PMV BLD AUTO: 9.2 FL (ref 7.4–10.4)
POCT GLUCOSE: 112 MG/DL (ref 70–110)
POCT GLUCOSE: 115 MG/DL (ref 70–110)
POCT GLUCOSE: 117 MG/DL (ref 70–110)
POCT GLUCOSE: 122 MG/DL (ref 70–110)
POCT GLUCOSE: 59 MG/DL (ref 70–110)
POTASSIUM SERPL-SCNC: 4.2 MMOL/L (ref 3.5–5.1)
POTASSIUM SERPL-SCNC: 4.3 MMOL/L (ref 3.5–5.1)
POTASSIUM SERPL-SCNC: 4.7 MMOL/L (ref 3.5–5.1)
PROT SERPL-MCNC: 6.4 GM/DL (ref 6.4–8.3)
PROT SERPL-MCNC: 7.1 GM/DL (ref 6.4–8.3)
PROT UR QL STRIP.AUTO: NEGATIVE MG/DL
RA WIDTH: 5.5 CM
RBC # BLD AUTO: 4.43 X10(6)/MCL (ref 4.7–6.1)
RBC # BLD AUTO: 4.7 X10(6)/MCL (ref 4.7–6.1)
RBC #/AREA URNS AUTO: ABNORMAL /HPF
RBC UR QL AUTO: NEGATIVE UNIT/L
SARS-COV-2 RNA RESP QL NAA+PROBE: NOT DETECTED
SODIUM SERPL-SCNC: 137 MMOL/L (ref 136–145)
SODIUM SERPL-SCNC: 138 MMOL/L (ref 136–145)
SODIUM SERPL-SCNC: 141 MMOL/L (ref 136–145)
SP GR UR STRIP.AUTO: 1.02
SPECIFIC GRAVITY, URINE AUTO (.000) (OHS): 1.02 (ref 1–1.03)
SQUAMOUS #/AREA URNS LPF: ABNORMAL /HPF
TDI LATERAL: 0.09 M/S
TR MAX PG: 43 MMHG
TRICUSPID ANNULAR PLANE SYSTOLIC EXCURSION: 1.19 CM
TRIGL SERPL-MCNC: 82 MG/DL (ref 34–140)
TROPONIN I SERPL-MCNC: 0.08 NG/ML (ref 0–0.04)
TROPONIN I SERPL-MCNC: 0.08 NG/ML (ref 0–0.04)
TROPONIN I SERPL-MCNC: 0.09 NG/ML (ref 0–0.04)
TROPONIN I SERPL-MCNC: 0.1 NG/ML (ref 0–0.04)
TROPONIN I SERPL-MCNC: 0.1 NG/ML (ref 0–0.04)
UROBILINOGEN UR STRIP-ACNC: NORMAL MG/DL
VLDLC SERPL CALC-MCNC: 16 MG/DL
WBC # SPEC AUTO: 7.5 X10(3)/MCL (ref 4.5–11.5)
WBC # SPEC AUTO: 8.1 X10(3)/MCL (ref 4.5–11.5)
WBC #/AREA URNS AUTO: ABNORMAL /HPF

## 2022-12-14 PROCEDURE — 25500020 PHARM REV CODE 255: Performed by: FAMILY MEDICINE

## 2022-12-14 PROCEDURE — 82550 ASSAY OF CK (CPK): CPT | Performed by: FAMILY MEDICINE

## 2022-12-14 PROCEDURE — 83880 ASSAY OF NATRIURETIC PEPTIDE: CPT | Performed by: FAMILY MEDICINE

## 2022-12-14 PROCEDURE — 36415 COLL VENOUS BLD VENIPUNCTURE: CPT | Performed by: INTERNAL MEDICINE

## 2022-12-14 PROCEDURE — 85379 FIBRIN DEGRADATION QUANT: CPT | Performed by: FAMILY MEDICINE

## 2022-12-14 PROCEDURE — G0378 HOSPITAL OBSERVATION PER HR: HCPCS

## 2022-12-14 PROCEDURE — 96372 THER/PROPH/DIAG INJ SC/IM: CPT | Mod: 59

## 2022-12-14 PROCEDURE — 36415 COLL VENOUS BLD VENIPUNCTURE: CPT | Performed by: STUDENT IN AN ORGANIZED HEALTH CARE EDUCATION/TRAINING PROGRAM

## 2022-12-14 PROCEDURE — 80053 COMPREHEN METABOLIC PANEL: CPT

## 2022-12-14 PROCEDURE — 96376 TX/PRO/DX INJ SAME DRUG ADON: CPT

## 2022-12-14 PROCEDURE — 96375 TX/PRO/DX INJ NEW DRUG ADDON: CPT | Mod: 59

## 2022-12-14 PROCEDURE — 80061 LIPID PANEL: CPT

## 2022-12-14 PROCEDURE — 85025 COMPLETE CBC W/AUTO DIFF WBC: CPT

## 2022-12-14 PROCEDURE — 80053 COMPREHEN METABOLIC PANEL: CPT | Performed by: FAMILY MEDICINE

## 2022-12-14 PROCEDURE — 93005 ELECTROCARDIOGRAM TRACING: CPT

## 2022-12-14 PROCEDURE — 96374 THER/PROPH/DIAG INJ IV PUSH: CPT | Mod: 59

## 2022-12-14 PROCEDURE — 63600175 PHARM REV CODE 636 W HCPCS

## 2022-12-14 PROCEDURE — 85025 COMPLETE CBC W/AUTO DIFF WBC: CPT | Performed by: FAMILY MEDICINE

## 2022-12-14 PROCEDURE — 84484 ASSAY OF TROPONIN QUANT: CPT

## 2022-12-14 PROCEDURE — 63600175 PHARM REV CODE 636 W HCPCS: Performed by: FAMILY MEDICINE

## 2022-12-14 PROCEDURE — 82962 GLUCOSE BLOOD TEST: CPT

## 2022-12-14 PROCEDURE — 81001 URINALYSIS AUTO W/SCOPE: CPT | Performed by: FAMILY MEDICINE

## 2022-12-14 PROCEDURE — 0240U COVID/FLU A&B PCR: CPT | Performed by: FAMILY MEDICINE

## 2022-12-14 PROCEDURE — 99285 EMERGENCY DEPT VISIT HI MDM: CPT | Mod: 25

## 2022-12-14 PROCEDURE — 25000003 PHARM REV CODE 250: Performed by: FAMILY MEDICINE

## 2022-12-14 PROCEDURE — 82077 ASSAY SPEC XCP UR&BREATH IA: CPT | Performed by: STUDENT IN AN ORGANIZED HEALTH CARE EDUCATION/TRAINING PROGRAM

## 2022-12-14 PROCEDURE — 25000003 PHARM REV CODE 250

## 2022-12-14 PROCEDURE — 82553 CREATINE MB FRACTION: CPT | Performed by: FAMILY MEDICINE

## 2022-12-14 PROCEDURE — 25000003 PHARM REV CODE 250: Performed by: STUDENT IN AN ORGANIZED HEALTH CARE EDUCATION/TRAINING PROGRAM

## 2022-12-14 PROCEDURE — 80307 DRUG TEST PRSMV CHEM ANLYZR: CPT | Performed by: FAMILY MEDICINE

## 2022-12-14 PROCEDURE — 84484 ASSAY OF TROPONIN QUANT: CPT | Performed by: FAMILY MEDICINE

## 2022-12-14 RX ORDER — NAPROXEN SODIUM 220 MG/1
324 TABLET, FILM COATED ORAL
Status: COMPLETED | OUTPATIENT
Start: 2022-12-14 | End: 2022-12-14

## 2022-12-14 RX ORDER — ENOXAPARIN SODIUM 100 MG/ML
40 INJECTION SUBCUTANEOUS EVERY 24 HOURS
Status: DISCONTINUED | OUTPATIENT
Start: 2022-12-14 | End: 2022-12-15 | Stop reason: HOSPADM

## 2022-12-14 RX ORDER — SODIUM CHLORIDE 9 MG/ML
INJECTION, SOLUTION INTRAVENOUS ONCE
Status: CANCELLED | OUTPATIENT
Start: 2022-12-15

## 2022-12-14 RX ORDER — MORPHINE SULFATE 2 MG/ML
4 INJECTION, SOLUTION INTRAMUSCULAR; INTRAVENOUS
Status: COMPLETED | OUTPATIENT
Start: 2022-12-14 | End: 2022-12-14

## 2022-12-14 RX ORDER — GLUCAGON 1 MG
1 KIT INJECTION
Status: DISCONTINUED | OUTPATIENT
Start: 2022-12-14 | End: 2022-12-15 | Stop reason: HOSPADM

## 2022-12-14 RX ORDER — FUROSEMIDE 10 MG/ML
40 INJECTION INTRAMUSCULAR; INTRAVENOUS DAILY
Status: DISCONTINUED | OUTPATIENT
Start: 2022-12-14 | End: 2022-12-15

## 2022-12-14 RX ORDER — METOPROLOL SUCCINATE 25 MG/1
25 TABLET, EXTENDED RELEASE ORAL DAILY
Status: DISCONTINUED | OUTPATIENT
Start: 2022-12-15 | End: 2022-12-15 | Stop reason: HOSPADM

## 2022-12-14 RX ORDER — IBUPROFEN 200 MG
16 TABLET ORAL
Status: DISCONTINUED | OUTPATIENT
Start: 2022-12-14 | End: 2022-12-15 | Stop reason: HOSPADM

## 2022-12-14 RX ORDER — NALOXONE HCL 0.4 MG/ML
0.02 VIAL (ML) INJECTION
Status: DISCONTINUED | OUTPATIENT
Start: 2022-12-14 | End: 2022-12-15 | Stop reason: HOSPADM

## 2022-12-14 RX ORDER — FUROSEMIDE 10 MG/ML
40 INJECTION INTRAMUSCULAR; INTRAVENOUS
Status: COMPLETED | OUTPATIENT
Start: 2022-12-14 | End: 2022-12-14

## 2022-12-14 RX ORDER — SODIUM CHLORIDE 0.9 % (FLUSH) 0.9 %
10 SYRINGE (ML) INJECTION
Status: DISCONTINUED | OUTPATIENT
Start: 2022-12-14 | End: 2022-12-15 | Stop reason: HOSPADM

## 2022-12-14 RX ORDER — INSULIN ASPART 100 [IU]/ML
0-5 INJECTION, SOLUTION INTRAVENOUS; SUBCUTANEOUS
Status: DISCONTINUED | OUTPATIENT
Start: 2022-12-14 | End: 2022-12-15 | Stop reason: HOSPADM

## 2022-12-14 RX ORDER — LABETALOL HCL 20 MG/4 ML
20 SYRINGE (ML) INTRAVENOUS EVERY 6 HOURS PRN
Status: DISCONTINUED | OUTPATIENT
Start: 2022-12-14 | End: 2022-12-15 | Stop reason: HOSPADM

## 2022-12-14 RX ORDER — NAPROXEN SODIUM 220 MG/1
81 TABLET, FILM COATED ORAL DAILY
Status: DISCONTINUED | OUTPATIENT
Start: 2022-12-14 | End: 2022-12-15 | Stop reason: HOSPADM

## 2022-12-14 RX ORDER — IBUPROFEN 200 MG
24 TABLET ORAL
Status: DISCONTINUED | OUTPATIENT
Start: 2022-12-14 | End: 2022-12-15 | Stop reason: HOSPADM

## 2022-12-14 RX ORDER — SODIUM CHLORIDE 0.9 % (FLUSH) 0.9 %
10 SYRINGE (ML) INJECTION EVERY 12 HOURS PRN
Status: DISCONTINUED | OUTPATIENT
Start: 2022-12-14 | End: 2022-12-15 | Stop reason: HOSPADM

## 2022-12-14 RX ORDER — ONDANSETRON 2 MG/ML
4 INJECTION INTRAMUSCULAR; INTRAVENOUS
Status: COMPLETED | OUTPATIENT
Start: 2022-12-14 | End: 2022-12-14

## 2022-12-14 RX ADMIN — SACUBITRIL AND VALSARTAN 1 TABLET: 24; 26 TABLET, FILM COATED ORAL at 09:12

## 2022-12-14 RX ADMIN — Medication 24 G: at 01:12

## 2022-12-14 RX ADMIN — MORPHINE SULFATE 4 MG: 2 INJECTION, SOLUTION INTRAMUSCULAR; INTRAVENOUS at 02:12

## 2022-12-14 RX ADMIN — ASPIRIN 81 MG CHEWABLE TABLET 324 MG: 81 TABLET CHEWABLE at 02:12

## 2022-12-14 RX ADMIN — ASPIRIN 81 MG CHEWABLE TABLET 81 MG: 81 TABLET CHEWABLE at 08:12

## 2022-12-14 RX ADMIN — FUROSEMIDE 40 MG: 10 INJECTION, SOLUTION INTRAMUSCULAR; INTRAVENOUS at 02:12

## 2022-12-14 RX ADMIN — IOHEXOL 100 ML: 300 INJECTION, SOLUTION INTRAVENOUS at 02:12

## 2022-12-14 RX ADMIN — FUROSEMIDE 40 MG: 10 INJECTION, SOLUTION INTRAMUSCULAR; INTRAVENOUS at 08:12

## 2022-12-14 RX ADMIN — ONDANSETRON 4 MG: 2 INJECTION INTRAMUSCULAR; INTRAVENOUS at 02:12

## 2022-12-14 RX ADMIN — ENOXAPARIN SODIUM 40 MG: 40 INJECTION SUBCUTANEOUS at 05:12

## 2022-12-14 NOTE — ED NOTES
Despite being told to notify RN when urinal is getting full, pt has been emptying it down the sink drain.  Educated pt on importance of notifying RN as we are doing strict I/O's on the pt.  Pt states was full twice and he emptied it

## 2022-12-14 NOTE — Clinical Note
Diagnosis: Acute congestive heart failure, unspecified heart failure type [1732893]   Admitting Provider:: TIA HART [[id number]]   Future Attending Provider: TIA HART [[id number]]   Reason for IP Medical Treatment  (Clinical interventions that can only be accomplished in the IP setting? ) :: chf   Estimated Length of Stay:: 2 midnights   I certify that Inpatient services for greater than or equal to 2 midnights are medically necessary:: Yes   Plans for Post-Acute care--if anticipated (pick the single best option):: A. No post acute care anticipated at this time

## 2022-12-14 NOTE — PROGRESS NOTES
"Inpatient Nutrition Evaluation    Admit Date: 2022   Total duration of encounter: 1 day    Nutrition Recommendation/Prescription     Advance diet once medically appropriate -- goal diet Cardiac  Will send Boost Plus BID once diet advanced  Monitor days NPO, wt, labs    Nutrition Assessment     Chart Review    Reason Seen: continuous nutrition monitoring    Diagnosis: SOB  Suspecting new onset CHF   HTN  HLD  Substance user  TIA hx    Relevant Medical History: HTN, HLD, H/O TIA in 2019, tobacco and polysubstance abuse    Nutrition-Related Medications: ASA, lasix, zofran    Nutrition-Related Labs: -Gluc 117, Creat 1.32, Alk Phos 161, ALT 61, AST 49, GFR >60, Lipids WNL      Diet Order: Diet NPO  Oral Supplement Order: none  Appetite/Oral Intake: NPO/NPO  Factors Affecting Nutritional Intake: NPO and shortness of breath  Food/Jehovah's witness/Cultural Preferences:  Pt reports following Low Na diet at home  Food Allergies: none reported       Wound(s):   None    Comments  : Visited pt in ED; family at bedside. Pt currently NPO, denies any N/V/D/C. Pt reports some fair appetite at dinnertime d/t increased SOB when sleeping, good appetite during the day; denies any unintentional wt loss. Pt states UBW ~185#. Pt ok to add chocolate Boost BID once diet advances.       Anthropometrics    Height: 6' 2" (188 cm) Height Method: Stated  Last Weight: 82.6 kg (182 lb) (Bed Scale) (22 0605) Weight Method: Stated  BMI (Calculated): 23.4  BMI Classification: normal (BMI 18.5-24.9)        Ideal Body Weight (IBW), Male: 190 lb     % Ideal Body Weight, Male (lb): 95.79 %                 Usual Body Weight (UBW), k.09 kg (UBW ~185# per pt)  % Usual Body Weight: 98.38     Usual Weight Provided By: patient and patient denies unintentional weight loss    Wt Readings from Last 5 Encounters:   22 82.6 kg (182 lb)   10/17/22 87.2 kg (192 lb 3.9 oz)   10/14/22 87.3 kg (192 lb 7.4 oz)   10/04/22 89.4 kg (197 lb 1.5 oz) "   08/23/22 84.8 kg (187 lb)     Weight Change(s) Since Admission:  Admit Weight: 83.9 kg (185 lb) (12/14/22 0015)  12/14: UBW ~185#; pt reports no recent unintentional wt loss; no edema noted    Patient Education    Education Provided: low sodium diet  Teaching Method: explanation and printed materials  Comprehension: verbalizes understanding  Barriers to Learning: none evident  Expected Compliance: good  Comments: All questions were answered and dietitian's contact information was provided.     Monitoring & Evaluation     Dietitian will monitor food and beverage intake, weight, electrolyte/renal panel, and glucose/endocrine profile.  Nutrition Risk/Follow-Up: low (follow-up in 5-7 days)  Patients assigned 'low nutrition risk' status do not qualify for a full nutritional assessment but will be monitored and re-evaluated in a 5-7 day time period. Please consult if re-evaluation needed sooner.

## 2022-12-14 NOTE — CONSULTS
" [facility] Consult Note     Cardiology Attending: Dr. Goldman  Internal Medicine Resident: Bharat Mejía MD  Date of Admit: 12/14/2022  Date of Consult: 12/14/2022    The patient was discussed with Dr. Goldman who agrees with the assessment and plan.    Reason for Consultation:     "New-onset Heart Failure"    History of Present Illness:      Isaías Prasad is a 57 y.o. male with history of ischemic stroke (2019), hypertension and recent colostomy reversal status post rectal foreign body trauma who presents to the ED today with a complaint of shortness of breath x1 week.  Patient states that for the past week, he is become progressive short of breath, worse when he is trying to lay down.  States that he has been unable to sleep for more than 2 hours as he gets very short of breath when he lays down.  Admits to associated intermittent equal swelling and abdominal distention but denies associated chest pain, palpitations, fever and chills.  Has never had these symptoms before and is not aware of any family history of cardiac disease  Patient does not have a PCP and is unaware of any personal cardiac history.  However, on chart review, in 2019 the patient had a echocardiogram that showed EF of 40% and mild diastolic dysfunction.  Patient admits to smoking about a half a pack a day since he was 16 and currently drinks about 3-4 drinks per day.  Does also note that he used to drink about a half a gallon of vodka when he was younger.  Has never traveled outside the country before.  Today, patient is feeling much better after receiving IV Lasix x2.  Is noted to be saturating well on room air in the ED room.  Since admission, had about 4.2 L output.    Past Medical History:     Past Medical History:   Diagnosis Date    Disruption of external operation (surgical) wound, not elsewhere classified, initial encounter 5/30/2022    Open abdominal wall wound 5/30/2022    Status post colostomy 5/30/2022    Tobacco user 5/26/2022    " Transient ischemic attack 5/26/2022     Surgical History:     Past Surgical History:   Procedure Laterality Date    COLOSTOMY      EXAMINATION UNDER ANESTHESIA N/A 5/18/2022    Procedure: Exam under anesthesia, removal of rectal foreign body;  Surgeon: Chauncey Jimenez Jr., MD;  Location: [facility];  Service: General;  Laterality: N/A;  David/Scheuermann, possible ex lap     Allergies:   Review of patient's allergies indicates:  No Known Allergies  Family History:   History reviewed. No pertinent family history.  Social History:     Social History     Tobacco Use    Smoking status: Every Day     Packs/day: 0.50     Years: 9.00     Pack years: 4.50     Types: Cigarettes    Smokeless tobacco: Never    Tobacco comments:     Smoked for 6 years then quit and started back smoking now for 3     years   Substance Use Topics    Alcohol use: Not Currently     Alcohol/week: 1.0 standard drink     Types: 1 Drinks containing 0.5 oz of alcohol per week     Comment: twice a week    Drug use: Not Currently     Types: Marijuana     Comment: monthly     Review of Systems:     Review of Systems   Constitutional:  Negative for chills and fever.   HENT:  Negative for congestion.    Respiratory:  Positive for shortness of breath.    Cardiovascular:  Negative for chest pain.   Gastrointestinal:  Negative for nausea and vomiting.   Genitourinary:  Negative for frequency.   Musculoskeletal:  Negative for neck pain.   Neurological:  Negative for headaches.   Medications:     Home Medications:  Prior to Admission medications    Medication Sig Start Date End Date Taking? Authorizing Provider   HYDROcodone-acetaminophen (NORCO) 5-325 mg per tablet Take 1 tablet by mouth every 6 (six) hours as needed for Pain. 10/19/22   Mic Armas MD   polyethylene glycol (GLYCOLAX) 17 gram PwPk Take 17 g by mouth once daily. 10/19/22   Mic Armas MD       Current/Inpatient Medications:  Infusions:    Scheduled:   aspirin  81 mg Oral Daily     "enoxaparin  40 mg Subcutaneous Daily    furosemide (LASIX) injection  40 mg Intravenous Daily     PRN:  dextrose 10%, dextrose 10%, glucagon (human recombinant), glucose, glucose, insulin aspart U-100, labetalol, naloxone, sodium chloride 0.9%    Objective:     24-hour Vitals:   Temp:  [97.3 °F (36.3 °C)-98 °F (36.7 °C)] 98 °F (36.7 °C)  Pulse:  [82-97] 83  Resp:  [14-20] 17  SpO2:  [95 %-100 %] 99 %  BP: (116-160)/() 117/81    Vitals: /81   Pulse 83   Temp 98 °F (36.7 °C) (Oral)   Resp 17   Ht 6' 2" (1.88 m)   Wt 82.6 kg (182 lb) Comment: Bed Scale  SpO2 99%   BMI 23.37 kg/m²     Physical Exam  Vitals and nursing note reviewed.   Constitutional:       General: He is not in acute distress.     Appearance: Normal appearance.   HENT:      Head: Normocephalic and atraumatic.      Nose: Nose normal. No congestion or rhinorrhea.   Eyes:      Extraocular Movements: Extraocular movements intact.      Pupils: Pupils are equal, round, and reactive to light.   Neck:      Comments: JVD noted   Cardiovascular:      Rate and Rhythm: Normal rate and regular rhythm.      Pulses: Normal pulses.      Heart sounds: No murmur heard.  Pulmonary:      Effort: Pulmonary effort is normal.      Breath sounds: Normal breath sounds. No rales.   Abdominal:      General: Abdomen is flat. There is no distension.      Palpations: Abdomen is soft.      Tenderness: There is no abdominal tenderness.   Musculoskeletal:         General: No swelling or tenderness. Normal range of motion.   Skin:     General: Skin is warm and dry.      Capillary Refill: Capillary refill takes less than 2 seconds.      Findings: No erythema.   Neurological:      General: No focal deficit present.      Mental Status: He is alert. He is disoriented.      Labs:   I have reviewed the following labs below:    Recent Labs   Lab 12/14/22  0029 12/14/22  0438   WBC 7.5 8.1   HGB 14.3 14.9   HCT 41.4* 43.9    321     Recent Labs   Lab 12/14/22  0030 " 12/14/22  0438    137   K 4.7 4.2   CO2 21* 22   BUN 26.2* 23.3   CREATININE 1.17 1.32*   CALCIUM 9.1 9.0     Recent Labs   Lab 12/14/22  0030 12/14/22 0438   ALBUMIN 3.7 3.8   BILITOT 0.7 0.9   AST 51* 49*   ALT 59* 61*   ALKPHOS 147 161*     No results for input(s): PT, PTT, INR in the last 168 hours.  Recent Labs   Lab 12/14/22  0029 12/14/22  0030 12/14/22  0438 12/14/22  0824 12/14/22  1232   TROPONINI  --    < > 0.100* 0.094* 0.077*   BNP 1,155.2*  --   --   --   --     < > = values in this interval not displayed.     Recent Labs   Lab 12/14/22 0438   CHOL 157   HDL 42   TRIG 82       Cardiac Studies/Imaging:   I have reviewed the following studies below:    ECG (12/14/22):  Per my interpretation, normal sinus rhythm with rate of 90 normal MN and QRS intervals, left axis deviation and T-wave inversions in V1 V2.  Repeat EKG shows findings concerning for bifascicular block.    TTE (12/14/22):  Per cardiologist's interpretation, estimated ejection fraction is 20%, grade 3 diastolic dysfunction, mildly enlarged left ventricle with eccentric hypertrophy, mild-to-moderate tricuspid regurgitation, mild mitral regurgitation and mildly reduced right ventricular systolic function with mild to moderate left atrial enlargement and pulmonary hypertension    LHC/Cors:  None    Imaging:   X-Ray Chest 1 View    Result Date: 12/14/2022  EXAMINATION: XR CHEST 1 VIEW CLINICAL HISTORY: Dyspnea; TECHNIQUE: Single view of the chest COMPARISON: 10/24/2019 FINDINGS: No focal opacification, pleural effusion, or pneumothorax. The cardiomediastinal silhouette is within normal limits. No acute osseous abnormality.     No acute cardiopulmonary process. Electronically signed by: Anshul Soares Date:    12/14/2022 Time:    06:22    CTA Chest Non-Coronary (PE Studies)    Result Date: 12/14/2022  EXAMINATION CTA CHEST NON CORONARY (PE STUDIES) CLINICAL HISTORY Pulmonary embolism (PE) suspected, positive D-dimer; TECHNIQUE  Post-contrast helical-acquisition CT images were obtained, with bolus timing to pulmonary arterial system for purposes of PE protocol CTA.  Multiplanar reconstructions, including MIP images, were accomplished by a CT technologist at a separate workstation, pushed to PACS for physician review. CONTRAST IV: Omnipaque 300, 100 mL COMPARISON None available at the time of initial interpretation. FINDINGS Images were reviewed in soft tissue, lung, and bone windows. Exam quality: adequate for evaluation Lines/tubes: none visualized Pulmonary Vessels: No central or large segmental filling defect. Cardiovascular: No suggestion of right heart strain; the RV:LV ratio is <1. No significant heart chamber enlargement. There is no pericardial effusion. No focal contour irregularity or intraluminal abnormality is appreciated involving the visualized aorta and branch vessels. Scattered aortic calcification is present.  Additionally, there is moderate to severe burden of coronary calcium. Lungs/Pleura: Central airways are widely patent.  There is scattered emphysematous changes throughout both lungs.  No acute airspace consolidation or suspicious focal lesion. No pleural thickening, significant effusion, or evidence of pneumothorax. Other Findings: No pathologic claudette enlargement or necrotic process. The visualized thyroid is unremarkable. No acute or suspicious focal abnormality through the visualized upper abdomen. No abnormality of the thoracic wall soft tissues. No acute osseous displacement or destructive focal lesion.  Degenerative changes are present throughout the visualized spinal column. IMPRESSION 1. No evidence of central or segmental pulmonary thromboembolism, or other acute intrathoracic process. 2. Widespread emphysematous changes. 3. Moderate to severe burden of coronary calcification. 4. Additional chronic secondary details discussed above. ========== No significant discrepancy identified in relation to the  teleradiology preliminary report. RADIATION DOSE Automated tube current modulation, weight-based exposure dosing, and/or iterative reconstruction technique utilized to reach lowest reasonably achievable exposure rate. DLP: 280 mGy*cm Electronically signed by: Austin Friend Date:    12/14/2022 Time:    08:12    Echo    Result Date: 12/14/2022  · The left ventricle is mildly enlarged with eccentric hypertrophy and severely decreased systolic function. · The estimated ejection fraction is 20%. · Grade III left ventricular diastolic dysfunction. · Moderate right atrial enlargement. · Mild mitral regurgitation. · Mild to moderate tricuspid regurgitation. · With mildly reduced right ventricular systolic function. · Mild to moderate left atrial enlargement. · There is pulmonary hypertension.      Assessment/Plan:     Cardiomyopathy with systolic and diastolic dysfunction  Pulmonary hypertension  Moderate tricuspid regurgitation  Hypertension  Alcohol abuse  Polysubstance abuse    Recommendations:  - Patient has evidence of wall-motion abnormality on echo, concerning for ischemic cardiomyopathy. Patient will need a coronary angiogram. Will speak to Interventional Cardiology about timing of procedure but would recommend placing NPO overnight for now.  -Agree with IV Lasix.  Has already received 40 mg x 2 with major improvements of his symptoms and swelling.  We will continue with Lasix, closely monitor I/O and was suggested repeat BMP in the afternoon to monitor for electrolytes.  -With EF of 20%, patient will need to be started on GDMT. As patient is Marco A Class II on exam, would recommend patient be started on metoprolol succinate 25 mg, low-dose Entresto and continue with lasix.   -Per guidelines, patient would also benefit from SGLT-2 inhibitor and aldactone but will hold off and attempt to start outpatient.  -Depending on coronary angiogram results, patient will also benefit from lifevest also. Will discuss with  patient on discharge.  -Educated the patient about the importance of alcohol cessation and maintaining a low-sodium diet. Also explained the need for weighing himself daily to watch for major weight changes.  -Will need to follow-up in the cardiology clinic once discharged.      Further recommendations from the cardiologist to follow.    Thank you for this interesting consult.    Bharat Mejía MD  [facility] Internal Medicine, PGY-3  12/14/2022 1:17 PM

## 2022-12-14 NOTE — H&P
[facility] Medicine Wards History & Physical Note     Resident Team: [facility] Medicine List 1  Attending Physician: Garrett Morejon MD  Resident: Pati Carnes MD  Intern: Naveed Dang MD    Date of Admit: 12/14/2022    Chief Complaint     Shortness of Breath (SOB x1 week)     Subjective:      History of Present Illness:  Isaías Prasad is a 57 y.o. male with PMH significant for HTN, HLD, H/O TIA in 2019, tobacco and polysubstance abuse presented to ED on 12/14/2022  with a primary complaint of Shortness of Breath (SOB x1 week)    Pt presenting with new onset SOB seen especially upon exertion. Has been present for the past few weeks and has not seen any improvement. His significant other has noticed he has had trouble keeping up with her while walking; needs her to slow down. Recent appetite change; used to eat steak for dinner but he becomes melgoza sooner which has decreased his appetite. Denied any associated coughing or chest pain. Pt was placed on medication for his htn and hld in the past but he does not take them.     ED Course - VS at admission HR 97 /105, AF and O2 99%. Labs collected BNP 1155.2, Trop 0.098, D-dimer 0.94. Chest x-ray was negative. CTA negative for PE. EKG showed no acute changes. While in ED pt complained of an itching sensation. UDS collected and + for amphetamines. Given 4mg of Morphine. Internal Medicine team was consulted for admission.       Past Medical History:  Past Medical History:   Diagnosis Date    Disruption of external operation (surgical) wound, not elsewhere classified, initial encounter 5/30/2022    Open abdominal wall wound 5/30/2022    Status post colostomy 5/30/2022    Tobacco user 5/26/2022    Transient ischemic attack 5/26/2022     Past Surgical History:  Past Surgical History:   Procedure Laterality Date    COLOSTOMY      EXAMINATION UNDER ANESTHESIA N/A 5/18/2022    Procedure: Exam under anesthesia, removal of rectal foreign body;  Surgeon: Chauncey Jimenez Jr.,  "MD;  Location: [facility];  Service: General;  Laterality: N/A;  David/Scheuermann, possible ex lap     Family History:  History reviewed. No pertinent family history.    Social History:  Social History     Tobacco Use    Smoking status: Every Day     Packs/day: 0.50     Years: 9.00     Pack years: 4.50     Types: Cigarettes    Smokeless tobacco: Never    Tobacco comments:     Smoked for 6 years then quit and started back smoking now for 3     years   Substance Use Topics    Alcohol use: Not Currently     Alcohol/week: 1.0 standard drink     Types: 1 Drinks containing 0.5 oz of alcohol per week     Comment: twice a week    Drug use: Not Currently     Types: Marijuana     Comment: monthly     Allergies:  Review of patient's allergies indicates:  No Known Allergies    Home Medications:  Prior to Admission medications    Medication Sig Start Date End Date Taking? Authorizing Provider   HYDROcodone-acetaminophen (NORCO) 5-325 mg per tablet Take 1 tablet by mouth every 6 (six) hours as needed for Pain. 10/19/22   Mic Armas MD   polyethylene glycol (GLYCOLAX) 17 gram PwPk Take 17 g by mouth once daily. 10/19/22   Mic Armas MD     Review of Systems   Constitutional:  Negative for fever and malaise/fatigue.   HENT:  Negative for congestion.    Respiratory:  Positive for shortness of breath. Negative for cough.    Cardiovascular:  Positive for orthopnea and leg swelling. Negative for chest pain.   Gastrointestinal:  Negative for nausea and vomiting.   Skin:  Positive for itching.   Neurological:  Negative for dizziness and weakness.       Objective:   Last 24 Hour Vital Signs:  BP  Min: 155/105  Max: 155/105  Temp  Av.3 °F (36.3 °C)  Min: 97.3 °F (36.3 °C)  Max: 97.3 °F (36.3 °C)  Pulse  Av  Min: 97  Max: 97  Resp  Av  Min: 20  Max: 20  SpO2  Av %  Min: 99 %  Max: 99 %  Height  Av' 2" (188 cm)  Min: 6' 2" (188 cm)  Max: 6' 2" (188 cm)  Weight  Av.9 kg (185 lb)  Min: 83.9 kg (185 lb)  " Max: 83.9 kg (185 lb)  Body mass index is 23.75 kg/m².  No intake/output data recorded.    Physical Exam  Constitutional:       General: He is not in acute distress.     Appearance: Normal appearance.   Cardiovascular:      Rate and Rhythm: Normal rate and regular rhythm.      Heart sounds: Normal heart sounds.      Comments: Minimal JVD present on L side  Pulmonary:      Effort: Pulmonary effort is normal. No respiratory distress.      Breath sounds: Normal breath sounds. No wheezing.   Abdominal:      General: Abdomen is flat. There is no distension.      Palpations: Abdomen is soft.      Tenderness: There is no abdominal tenderness. There is no guarding.   Musculoskeletal:      Right lower leg: No edema.      Left lower leg: No edema.   Skin:     General: Skin is warm.      Laboratory:  Most Recent Data:  CBC:   Lab Results   Component Value Date    WBC 7.5 12/14/2022    HGB 14.3 12/14/2022    HCT 41.4 (L) 12/14/2022     12/14/2022    MCV 93.5 12/14/2022    RDW 12.6 12/14/2022     WBC Differential:   Recent Labs   Lab 12/14/22  0029   WBC 7.5   HGB 14.3   HCT 41.4*      MCV 93.5     BMP:   Lab Results   Component Value Date     12/14/2022    K 4.7 12/14/2022    CO2 21 (L) 12/14/2022    BUN 26.2 (H) 12/14/2022    CREATININE 1.17 12/14/2022    CALCIUM 9.1 12/14/2022    MG 2.10 10/19/2022    PHOS 2.6 10/19/2022     LFTs:   Lab Results   Component Value Date    ALBUMIN 3.7 12/14/2022    BILITOT 0.7 12/14/2022    AST 51 (H) 12/14/2022    ALKPHOS 147 12/14/2022    ALT 59 (H) 12/14/2022     FLP:   Lab Results   Component Value Date    CHOL 174 10/25/2019    HDL 47 10/25/2019    TRIG 133 10/25/2019     DM:   Lab Results   Component Value Date    HGBA1C 5.7 10/25/2019    CREATININE 1.17 12/14/2022     Thyroid:   Lab Results   Component Value Date    TSH 0.990 10/25/2019      Lab Results   Component Value Date    WSEJOPNB97 423 10/25/2019       Lab Results   Component Value Date    FOLATE 5.9 10/25/2019       Cardiac:   Lab Results   Component Value Date    TROPONINI 0.098 (H) 12/14/2022    BNP 1,155.2 (H) 12/14/2022     Urinalysis:   Lab Results   Component Value Date    COLORU LIGHT YELLOW 10/24/2019    PHUA 7.0 12/14/2022    NITRITE Negative 10/24/2019    KETONESU Negative 10/24/2019    UROBILINOGEN Normal 12/14/2022    WBCUA 0-5 12/14/2022     Trended Lab Data:  Recent Labs   Lab 12/14/22  0029 12/14/22  0030   WBC 7.5  --    HGB 14.3  --    HCT 41.4*  --      --    MCV 93.5  --    RDW 12.6  --    NA  --  138   K  --  4.7   CO2  --  21*   BUN  --  26.2*   CREATININE  --  1.17   ALBUMIN  --  3.7   BILITOT  --  0.7   AST  --  51*   ALKPHOS  --  147   ALT  --  59*     Trended Cardiac Data:  Recent Labs   Lab 12/14/22  0029 12/14/22  0030   TROPONINI  --  0.098*   BNP 1,155.2*  --      Assessment & Plan:   SOB  Suspecting new onset CHF   H/O of HTN and HLD - pt uncompliant with home medications  Echo in 10/19 had EF of 40%  Recent increased SOB especially with exertion   BNP 1155.2, Trop 0.098, D-dimer 0.94  CTA negative for PE, Chest x-ray negative for pneumonia  EKG - Sinus rhythm with previous RBBB morphology and nonspecific ST changes   Continue to trend Trop with EKG; monitor for any changes  Initiated Lasix 40mg daily  Strict I&O monitoring and daily weights   Echo ordered  Consider Cardiology consult pending results of echo     HTN  IV Labetolol prn     HLD  Lipid panel ordered  Consider adding statin     H/O TIA (2019)   Initiated on aspirin 81mg daily    Substance Abuse   UDS + for Marijuana and Amphetamines   Alchol abuse - unspecified amount  Ethanol level ordered  Continue to monitor for any withdrawal symptoms     Tobacco Abuse   Smokes everyday; >25 yrs  Consider nicotine patch if pt desires      CODE STATUS: Full code   Access: Peripheral IV  Antibiotics: none  Diet: NPO  DVT Prophylaxis: Lovenox  GI Prophylaxis: None  Fluids: None      Disposition: Pt admitted for new onset CHF. Will continue  to trend Trops and EKG; monitor for any changes. Echo scheduled for AM; consult cardiology pending echo results.       Naveed Dang MD  [facility]

## 2022-12-14 NOTE — ED PROVIDER NOTES
Encounter Date: 12/14/2022       History     Chief Complaint   Patient presents with    Shortness of Breath     SOB x1 week     57-year-old gentleman presents emergency room complaints of shortness of breath.  Symptoms been ongoing for the last 2-3 weeks.  Patient reports dyspnea on exertion as well as orthopnea.  When symptoms not improve, came to the emergency room for evaluation.  Patient denies chest pain.  Denies abdominal pain.  Denies nausea or vomiting.  Reports symptoms are severe    The history is provided by the patient.   Review of patient's allergies indicates:  No Known Allergies  Past Medical History:   Diagnosis Date    Disruption of external operation (surgical) wound, not elsewhere classified, initial encounter 5/30/2022    Open abdominal wall wound 5/30/2022    Status post colostomy 5/30/2022    Tobacco user 5/26/2022    Transient ischemic attack 5/26/2022     Past Surgical History:   Procedure Laterality Date    COLOSTOMY      EXAMINATION UNDER ANESTHESIA N/A 5/18/2022    Procedure: Exam under anesthesia, removal of rectal foreign body;  Surgeon: Chauncey Jimenez Jr., MD;  Location: [facility];  Service: General;  Laterality: N/A;  David/Scheuermann, possible ex lap     History reviewed. No pertinent family history.  Social History     Tobacco Use    Smoking status: Every Day     Packs/day: 0.50     Years: 9.00     Pack years: 4.50     Types: Cigarettes    Smokeless tobacco: Never    Tobacco comments:     Smoked for 6 years then quit and started back smoking now for 3     years   Substance Use Topics    Alcohol use: Not Currently     Alcohol/week: 1.0 standard drink     Types: 1 Drinks containing 0.5 oz of alcohol per week     Comment: twice a week    Drug use: Not Currently     Types: Marijuana     Comment: monthly     Review of Systems   Constitutional:  Negative for chills, fatigue and fever.   HENT:  Negative for ear pain, rhinorrhea and sore throat.    Eyes:  Negative for photophobia and  pain.   Respiratory:  Negative for cough, shortness of breath and wheezing.    Cardiovascular:  Negative for chest pain.   Gastrointestinal:  Negative for abdominal pain, diarrhea, nausea and vomiting.   Genitourinary:  Negative for dysuria.   Neurological:  Negative for dizziness, weakness and headaches.   All other systems reviewed and are negative.    Physical Exam     Initial Vitals [12/14/22 0015]   BP Pulse Resp Temp SpO2   (!) 155/105 97 20 97.3 °F (36.3 °C) 99 %      MAP       --         Physical Exam    Nursing note and vitals reviewed.  Constitutional: He appears well-developed and well-nourished.   HENT:   Head: Normocephalic and atraumatic.   Eyes: EOM are normal. Pupils are equal, round, and reactive to light.   Neck: Neck supple.   Distended neck   Normal range of motion.  Cardiovascular:  Normal rate, regular rhythm, normal heart sounds and intact distal pulses.     Exam reveals no gallop and no friction rub.       No murmur heard.  Pulmonary/Chest: Breath sounds normal. No respiratory distress.   Abdominal: Abdomen is soft. Bowel sounds are normal. He exhibits no distension. There is no abdominal tenderness.   Musculoskeletal:         General: No edema. Normal range of motion.      Cervical back: Normal range of motion and neck supple.     Neurological: He is alert and oriented to person, place, and time. He has normal strength.   Skin: Skin is warm and dry.   Psychiatric: He has a normal mood and affect. His behavior is normal. Judgment and thought content normal.       ED Course   Procedures  Labs Reviewed   B-TYPE NATRIURETIC PEPTIDE - Abnormal; Notable for the following components:       Result Value    Natriuretic Peptide 1,155.2 (*)     All other components within normal limits   TROPONIN I - Abnormal; Notable for the following components:    Troponin-I 0.098 (*)     All other components within normal limits   COMPREHENSIVE METABOLIC PANEL - Abnormal; Notable for the following components:     Carbon Dioxide 21 (*)     Glucose Level 113 (*)     Blood Urea Nitrogen 26.2 (*)     Alanine Aminotransferase 59 (*)     Aspartate Aminotransferase 51 (*)     All other components within normal limits   URINALYSIS, REFLEX TO URINE CULTURE - Abnormal; Notable for the following components:    Squamous Epithelial Cells, UA Trace (*)     Mucous, UA Trace (*)     All other components within normal limits   D DIMER, QUANTITATIVE - Abnormal; Notable for the following components:    D-Dimer 0.94 (*)     All other components within normal limits   CBC WITH DIFFERENTIAL - Abnormal; Notable for the following components:    RBC 4.43 (*)     Hct 41.4 (*)     MCH 32.3 (*)     All other components within normal limits   DRUG SCREEN, URINE (BEAKER) - Abnormal; Notable for the following components:    Amphetamines, Urine Positive (*)     Benzodiazepine, Urine Positive (*)     Cannabinoids, Urine Positive (*)     All other components within normal limits    Narrative:     Cut off concentrations:    Amphetamines - 1000 ng/ml  Barbiturates - 200 ng/ml  Benzodiazepine - 200 ng/ml  Cannabinoids (THC) - 50 ng/ml  Cocaine - 300 ng/ml  Fentanyl - 1.0 ng/ml  MDMA - 500 ng/ml  Opiates - 300 ng/ml   Phencyclidine (PCP) - 25 ng/ml    Specimen submitted for drug analysis and tested for pH and specific gravity in order to evaluate sample integrity. Suspect tampering if specific gravity is <1.003 and/or pH is not within the range of 4.5 - 8.0  False negatives may result form substances such as bleach added to urine.  False positives may result for the presence of a substance with similar chemical structure to the drug or its metabolite.    This test provides only a PRELIMINARY analytical test result. A more specific alternate chemical method must be used in order to obtain a confirmed analytical result. Gas chromatography/mass spectrometry (GC/MS) is the preferred confirmatory method. Other chemical confirmation methods are available. Clinical  consideration and professional judgement should be applied to any drug of abuse test result, particularly when preliminary positive results are used.    Positive results will be confirmed only at the physicians request. Unconfirmed screening results are to be used only for medical purposes (treatment).        CK - Normal   CK-MB - Normal   COVID/FLU A&B PCR - Normal    Narrative:     The Xpert Xpress SARS-CoV-2/FLU/RSV plus is a rapid, multiplexed real-time PCR test intended for the simultaneous qualitative detection and differentiation of SARS-CoV-2, Influenza A, Influenza B, and respiratory syncytial virus (RSV) viral RNA in either nasopharyngeal swab or nasal swab specimens.         CBC W/ AUTO DIFFERENTIAL    Narrative:     The following orders were created for panel order CBC Auto Differential.  Procedure                               Abnormality         Status                     ---------                               -----------         ------                     CBC with Differential[[id number]]        Abnormal            Final result                 Please view results for these tests on the individual orders.   EXTRA TUBES    Narrative:     The following orders were created for panel order EXTRA TUBES.  Procedure                               Abnormality         Status                     ---------                               -----------         ------                     Gold Top Hold[[id number]]                                                                 Please view results for these tests on the individual orders.   GOLD TOP HOLD     EKG Readings: (Independently Interpreted)   12/14/2022 12:38 AM  Rate: 89 bpm  Rhythm: Sinus  Axis: Leftward  Intervals: QRS 152ms (prolonged) with RBBB morphology  ST Changes: Nonspecific  Impression: Normal sinus rhythm with nonspecific ST changes.         Imaging Results              CTA Chest Non-Coronary (PE Studies) (In process)                      X-Ray Chest 1  "View (In process)                      Medications   iohexoL (OMNIPAQUE 300) injection 100 mL (100 mLs Intravenous Given 12/14/22 0200)   aspirin chewable tablet 324 mg (324 mg Oral Given 12/14/22 0249)   morphine injection 4 mg (4 mg Intravenous Given 12/14/22 0249)   ondansetron injection 4 mg (4 mg Intravenous Given 12/14/22 0249)   furosemide injection 40 mg (40 mg Intravenous Given 12/14/22 0252)     Medical Decision Making:   Initial Assessment:   Patient currently appears in no acute distress, saturating 99% and without tachypnea.  Patient reports symptoms of dyspnea on exertion and orthopnea concerning for congestive heart failure, but does not appear to have any peripheral edema.  Patient does appear to have slight JVD and distended neck veins.  No crackles appreciated on pulmonary examination.  Differential diagnosis also includes a pulmonary embolism.  Will obtain laboratory evaluation and continue to closely monitor.           ED Course as of 12/14/22 0254   Wed Dec 14, 2022   0041 WBC: 7.5 [MW]   0041 HEMOGLOBIN: 14.3 [MW]   0041 HEMATOCRIT(!): 41.4 [MW]   0041 Platelets: 291 [MW]   0123 D-Dimer, Quantitative(!) [MW]   0123 Urinalysis, Reflex to Urine Culture Urine, Clean Catch [MW]   0124 Sodium: 138 [MW]   0124 BUN(!): 26.2 [MW]   0124 Creatinine: 1.17 [MW]   0124 Potassium: 4.7 [MW]   0124 Chloride: 107 [MW]   0124 CO2(!): 21 [MW]   0234 Patient currently reports having a "crawling sensation" on his skin, admits to methamphetamine abuse.  Patient reports that he "sometimes smokes it".  Currently awaiting for CT angio for evaluation of pulmonary embolism.  Patient has elevated BNP consistent with congestive heart failure likely leading to patient's shortness of breath.  Due to the new onset congestive heart failure, patient will be admitted to Internal Medicine, but awaiting to see if there may be an underlying cause to the congestive heart failure such as a pulmonary embolism.  Will provide the " patient with pain medication while awaiting results. [MW]   0248 CT chest of the lungs with contrast shows no filling defects in the pulmonary arteries to suggest pulmonary embolus.  Mild emphysematous changes are seen in the lungs without local infiltrate or consolidation.  No acute intrathoracic process.  Identified.   [MW]   0249 Internal medicine consulted for evaluation due to acute CHF. [MW]      ED Course User Index  [MW] Garrett Morejon MD                   Clinical Impression:   Final diagnoses:  [R06.00] Dyspnea  [I50.9] Acute congestive heart failure, unspecified heart failure type (Primary)  [F15.10] Methamphetamine abuse        ED Disposition Condition    Observation Stable                Garrett Morejon MD  12/14/22 0254

## 2022-12-14 NOTE — PLAN OF CARE
Please see H and P for further information. This is upper level addendum to Dr. Dang's H and P.    56 yo  male with PMH of TIA in 2019, chronic smoker, and substance user presented to the ED with a complaint of shortness of breath for 2 weeks. There is orthopnea. Pt has no improvement in symptoms and reportedly hasn't been eating much. Significant other at bedside reports that patient had no chest pain, abdominal pain,, nausea or vomiting. He has had no diagnosis of cardiac disease in the past. In the ED, he remained 99 % on room air without use of supplemental oxygen and loaded with aspirin. Troponin mildly elevated 0.098 and D dimer 0.94. CT PE ordered showed no pulmonary embolism. CXR showed no acute cardiopulmonary process. BNP extremely elevated at 1,155.2. Lasix 1 x IV given in the ED.    PE  Neck :Mild JVD,  Prominent neck veins  Resp: No crackles appreciated  Extremities: No pitting edema  Warm extremities  Skin: excoriations present    Assessment and Plan  SOB  Substance user  HLD  TIA hx    - Plan is to admitted for SOB work-up , possible new onset CHF. Pending echocardiogram.   - Last echo in 2019 showed diastolic dysfunction and EF 40 %. Does not take home medications.  - Plan to continue Lasix 40 mg IV for diuresing, strict intake and output measurements, and daily weights  - NPO, continue cardiac monitor.  - Pending lipid panel.   - Trend troponin and EKG 2 more doses.     Pati Carnes MD  -III

## 2022-12-15 VITALS
SYSTOLIC BLOOD PRESSURE: 109 MMHG | HEART RATE: 88 BPM | TEMPERATURE: 98 F | WEIGHT: 182.81 LBS | DIASTOLIC BLOOD PRESSURE: 70 MMHG | OXYGEN SATURATION: 100 % | RESPIRATION RATE: 18 BRPM | HEIGHT: 74 IN | BODY MASS INDEX: 23.46 KG/M2

## 2022-12-15 LAB
ALBUMIN SERPL-MCNC: 4 G/DL (ref 3.5–5)
ALBUMIN/GLOB SERPL: 1 RATIO (ref 1.1–2)
ALP SERPL-CCNC: 176 UNIT/L (ref 40–150)
ALT SERPL-CCNC: 56 UNIT/L (ref 0–55)
AST SERPL-CCNC: 33 UNIT/L (ref 5–34)
BASOPHILS # BLD AUTO: 0.11 X10(3)/MCL (ref 0–0.2)
BASOPHILS NFR BLD AUTO: 1.2 %
BILIRUBIN DIRECT+TOT PNL SERPL-MCNC: 1.2 MG/DL
BUN SERPL-MCNC: 27.2 MG/DL (ref 8.4–25.7)
CALCIUM SERPL-MCNC: 9.6 MG/DL (ref 8.4–10.2)
CHLORIDE SERPL-SCNC: 101 MMOL/L (ref 98–107)
CO2 SERPL-SCNC: 25 MMOL/L (ref 22–29)
CREAT SERPL-MCNC: 1.62 MG/DL (ref 0.73–1.18)
EOSINOPHIL # BLD AUTO: 0.3 X10(3)/MCL (ref 0–0.9)
EOSINOPHIL NFR BLD AUTO: 3.3 %
ERYTHROCYTE [DISTWIDTH] IN BLOOD BY AUTOMATED COUNT: 12.7 % (ref 11.6–14.4)
GFR SERPLBLD CREATININE-BSD FMLA CKD-EPI: 50 MLS/MIN/1.73/M2
GLOBULIN SER-MCNC: 3.9 GM/DL (ref 2.4–3.5)
GLUCOSE SERPL-MCNC: 91 MG/DL (ref 74–100)
HCT VFR BLD AUTO: 50.2 % (ref 42–52)
HGB BLD-MCNC: 17.2 GM/DL (ref 14–18)
IMM GRANULOCYTES # BLD AUTO: 0.05 X10(3)/MCL (ref 0–0.04)
IMM GRANULOCYTES NFR BLD AUTO: 0.6 %
LYMPHOCYTES # BLD AUTO: 2.62 X10(3)/MCL (ref 0.6–4.6)
LYMPHOCYTES NFR BLD AUTO: 29.2 %
MCH RBC QN AUTO: 32.1 PG
MCHC RBC AUTO-ENTMCNC: 34.3 MG/DL (ref 33–36)
MCV RBC AUTO: 93.8 FL (ref 80–94)
MONOCYTES # BLD AUTO: 0.89 X10(3)/MCL (ref 0.1–1.3)
MONOCYTES NFR BLD AUTO: 9.9 %
NEUTROPHILS # BLD AUTO: 5.01 X10(3)/MCL (ref 2.1–9.2)
NEUTROPHILS NFR BLD AUTO: 55.8 %
NRBC BLD AUTO-RTO: 0 % (ref 0–1)
PLATELET # BLD AUTO: 373 X10(3)/MCL (ref 140–371)
PMV BLD AUTO: 9.4 FL (ref 9.4–12.4)
POCT GLUCOSE: 102 MG/DL (ref 70–110)
POTASSIUM SERPL-SCNC: 4.4 MMOL/L (ref 3.5–5.1)
PROT SERPL-MCNC: 7.9 GM/DL (ref 6.4–8.3)
RBC # BLD AUTO: 5.35 X10(6)/MCL (ref 4.7–6.1)
SODIUM SERPL-SCNC: 139 MMOL/L (ref 136–145)
WBC # SPEC AUTO: 9 X10(3)/MCL (ref 4.5–11.5)

## 2022-12-15 PROCEDURE — 25000003 PHARM REV CODE 250

## 2022-12-15 PROCEDURE — 63600175 PHARM REV CODE 636 W HCPCS: Performed by: INTERNAL MEDICINE

## 2022-12-15 PROCEDURE — 85025 COMPLETE CBC W/AUTO DIFF WBC: CPT

## 2022-12-15 PROCEDURE — G0378 HOSPITAL OBSERVATION PER HR: HCPCS

## 2022-12-15 PROCEDURE — 94761 N-INVAS EAR/PLS OXIMETRY MLT: CPT

## 2022-12-15 PROCEDURE — 36415 COLL VENOUS BLD VENIPUNCTURE: CPT

## 2022-12-15 PROCEDURE — 90471 IMMUNIZATION ADMIN: CPT | Performed by: INTERNAL MEDICINE

## 2022-12-15 PROCEDURE — 25000003 PHARM REV CODE 250: Performed by: STUDENT IN AN ORGANIZED HEALTH CARE EDUCATION/TRAINING PROGRAM

## 2022-12-15 PROCEDURE — 80053 COMPREHEN METABOLIC PANEL: CPT

## 2022-12-15 PROCEDURE — 90686 IIV4 VACC NO PRSV 0.5 ML IM: CPT | Performed by: INTERNAL MEDICINE

## 2022-12-15 RX ORDER — FUROSEMIDE 20 MG/1
20 TABLET ORAL 2 TIMES DAILY
Qty: 60 TABLET | Refills: 11 | Status: SHIPPED | OUTPATIENT
Start: 2022-12-15 | End: 2023-01-11 | Stop reason: SDUPTHER

## 2022-12-15 RX ORDER — NAPROXEN SODIUM 220 MG/1
81 TABLET, FILM COATED ORAL DAILY
Qty: 30 TABLET | Refills: 0 | Status: SHIPPED | OUTPATIENT
Start: 2022-12-16 | End: 2023-01-11 | Stop reason: SDUPTHER

## 2022-12-15 RX ORDER — METOPROLOL SUCCINATE 25 MG/1
25 TABLET, EXTENDED RELEASE ORAL DAILY
Qty: 30 TABLET | Refills: 0 | Status: SHIPPED | OUTPATIENT
Start: 2022-12-16 | End: 2023-01-11 | Stop reason: SDUPTHER

## 2022-12-15 RX ORDER — ATORVASTATIN CALCIUM 80 MG/1
40 TABLET, FILM COATED ORAL DAILY
Qty: 15 TABLET | Refills: 0 | Status: SHIPPED | OUTPATIENT
Start: 2022-12-15 | End: 2023-01-11 | Stop reason: SDUPTHER

## 2022-12-15 RX ADMIN — SACUBITRIL AND VALSARTAN 1 TABLET: 24; 26 TABLET, FILM COATED ORAL at 08:12

## 2022-12-15 RX ADMIN — METOPROLOL SUCCINATE 25 MG: 25 TABLET, EXTENDED RELEASE ORAL at 08:12

## 2022-12-15 RX ADMIN — INFLUENZA VIRUS VACCINE 0.5 ML: 15; 15; 15; 15 SUSPENSION INTRAMUSCULAR at 12:12

## 2022-12-15 RX ADMIN — ASPIRIN 81 MG CHEWABLE TABLET 81 MG: 81 TABLET CHEWABLE at 08:12

## 2022-12-15 NOTE — DISCHARGE SUMMARY
[facility] Discharge Summary    Admitting Physician: Kat Anne MD  Attending Physician: Kat Anne MD  Date of Admit: 12/14/2022  Date of Discharge: 12/15/2022    Condition: Stable  Outcome: Condition has improved and patient is now ready for discharge.  DISPOSITION: Home or Self Care    Discharge Diagnoses     Patient Active Problem List   Diagnosis    Hypertension    Tobacco user    Transient ischemic attack    Disruption of external operation (surgical) wound, not elsewhere classified, initial encounter    Open abdominal wall wound    Status post colostomy    S/P colostomy takedown    Acute congestive heart failure    Dyspnea    Methamphetamine abuse     Principal Problem:  Acute congestive heart failure    Consultants and Procedures     Consultants:  IP CONSULT TO HOSPITAL MEDICINE  IP CONSULT TO CARDIOLOGY  IP CONSULT TO SOCIAL WORK/CASE MANAGEMENT  IP CONSULT TO SOCIAL WORK/CASE MANAGEMENT    Procedures:   Procedure(s) (LRB):  Angiogram, Coronary, with Left Heart Cath (Right)     Brief Admission History      Isaías Prasad is a 57 y.o. male with PMH significant for HTN, HLD, H/O TIA in 2019, tobacco and polysubstance abuse presented to ED on 12/14/2022  with a primary complaint of new onset SOB seen especially upon exertion. Has been present for the past few weeks and has not seen any improvement. His significant other has noticed he has had trouble keeping up with her while walking; needs her to slow down. Recent appetite change; used to eat steak for dinner but he becomes melgoza sooner which has decreased his appetite. Denied any associated coughing or chest pain. Pt was placed on medication for his htn and hld in the past but he does not take them.      ED Course - VS at admission HR 97 /105, AF and O2 99%. Labs collected BNP 1155.2, Trop 0.098, D-dimer 0.94. Chest x-ray was negative. CTA negative for PE. EKG showed no acute changes. While in ED pt complained of an itching  "sensation. UDS collected and + for amphetamines. Given 4mg of Morphine. Internal Medicine team was consulted for admission.     Hospital Course with Pertinent Findings     Pt was admitted on 12/14/22 with new onset CHF. An echo was performed on admission which showed an EF of 20% with grade III LV diastolic dysfunction and L ventricular eccentric hypertrophy with severely decreased systolic dysfunction. Pt was placed on lasix 40mg daily; and symptoms began to improve. Cardiology was consulted and pt initiated on GDMT (Entresto 24-26 and Metoprolol succinate 25mg). Upon discharge cardiology recommended a lifevest, daily lasix 20mg, initaitation of a statin and aspirin, as well as close outpatient follow up. Pt will need a coronary angiogram performed outpatient.   On day of discharge pt's shortness of breath resolved. He was able to sleep lying flat without shortness of breath, and edema present in bilateral lower extremities had resolved. Denied any chest pain or tightness. Tolerating PO intake and regular daily activities. Pt is cleared for discharge.      Discharge physical exam:  Vitals  BP: 109/70  Temp: 98.1 °F (36.7 °C)  Temp src: Oral  Pulse: 88  Resp: 18  SpO2: 100 %  Height: 6' 2" (188 cm)  Weight: 82.9 kg (182 lb 12.8 oz)    Physical Exam  Constitutional:       General: He is not in acute distress.     Appearance: Normal appearance.   Cardiovascular:      Rate and Rhythm: Normal rate and regular rhythm.      Pulses: Normal pulses.      Heart sounds: Normal heart sounds.   Pulmonary:      Effort: Pulmonary effort is normal. No respiratory distress.      Breath sounds: Normal breath sounds. No wheezing.   Chest:      Chest wall: No tenderness.   Abdominal:      General: Abdomen is flat. Bowel sounds are normal. There is no distension.      Palpations: Abdomen is soft.      Tenderness: There is no abdominal tenderness.   Musculoskeletal:      Right lower leg: No edema.      Left lower leg: No edema. "   Neurological:      Mental Status: He is oriented to person, place, and time.   Psychiatric:         Mood and Affect: Mood normal.        TIME SPENT ON DISCHARGE: 60 minutes    Discharge Medications        Medication List        START taking these medications      aspirin 81 MG Chew  Take 1 tablet (81 mg total) by mouth once daily.  Start taking on: December 16, 2022     atorvastatin 80 MG tablet  Commonly known as: LIPITOR  Take 0.5 tablets (40 mg total) by mouth once daily.     furosemide 20 MG tablet  Commonly known as: LASIX  Take 1 tablet (20 mg total) by mouth 2 (two) times daily.     metoprolol succinate 25 MG 24 hr tablet  Commonly known as: TOPROL-XL  Take 1 tablet (25 mg total) by mouth once daily.  Start taking on: December 16, 2022     sacubitriL-valsartan 24-26 mg per tablet  Commonly known as: ENTRESTO  Take 1 tablet by mouth 2 (two) times daily.            STOP taking these medications      HYDROcodone-acetaminophen 5-325 mg per tablet  Commonly known as: NORCO     polyethylene glycol 17 gram Pwpk  Commonly known as: GLYCOLAX               Where to Get Your Medications        These medications were sent to [address] [address]      Phone: [phone]   aspirin 81 MG Chew  atorvastatin 80 MG tablet  furosemide 20 MG tablet  metoprolol succinate 25 MG 24 hr tablet  sacubitriL-valsartan 24-26 mg per tablet       Discharge Information:     Discussed with pt importance of close cardiology follow up. New medications discussed in depth. Plan for coronary angiogram outpatient work up. Touched on importance of smoking cessation. Strict ED precautions given. Pt to [place] [facility] [place] the next week, and urgent Cardiology clinic referral given.    Naveed Dang MD  [facility]

## 2022-12-15 NOTE — PROGRESS NOTES
Megan with [facility] reported Dr. Naveed Dang not found as a medicaid provider and requested LifeVest order from another physician. Sent message to Team 1.

## 2023-01-11 ENCOUNTER — OFFICE VISIT (OUTPATIENT)
Dept: FAMILY MEDICINE | Facility: CLINIC | Age: 58
End: 2023-01-11
Payer: MEDICAID

## 2023-01-11 VITALS
DIASTOLIC BLOOD PRESSURE: 73 MMHG | WEIGHT: 191.5 LBS | HEIGHT: 74 IN | OXYGEN SATURATION: 99 % | RESPIRATION RATE: 18 BRPM | BODY MASS INDEX: 24.58 KG/M2 | HEART RATE: 83 BPM | TEMPERATURE: 98 F | SYSTOLIC BLOOD PRESSURE: 126 MMHG

## 2023-01-11 DIAGNOSIS — I50.9 ACUTE CONGESTIVE HEART FAILURE, UNSPECIFIED HEART FAILURE TYPE: Primary | ICD-10-CM

## 2023-01-11 PROCEDURE — 99215 OFFICE O/P EST HI 40 MIN: CPT | Mod: PBBFAC

## 2023-01-11 RX ORDER — FUROSEMIDE 20 MG/1
20 TABLET ORAL 2 TIMES DAILY
Qty: 60 TABLET | Refills: 0 | Status: SHIPPED | OUTPATIENT
Start: 2023-01-11 | End: 2023-01-17 | Stop reason: SDUPTHER

## 2023-01-11 RX ORDER — ATORVASTATIN CALCIUM 80 MG/1
40 TABLET, FILM COATED ORAL DAILY
Qty: 15 TABLET | Refills: 0 | Status: SHIPPED | OUTPATIENT
Start: 2023-01-11 | End: 2023-01-17 | Stop reason: SDUPTHER

## 2023-01-11 RX ORDER — METOPROLOL SUCCINATE 25 MG/1
25 TABLET, EXTENDED RELEASE ORAL DAILY
Qty: 30 TABLET | Refills: 0 | Status: SHIPPED | OUTPATIENT
Start: 2023-01-11 | End: 2023-01-17 | Stop reason: SDUPTHER

## 2023-01-11 RX ORDER — NAPROXEN SODIUM 220 MG/1
81 TABLET, FILM COATED ORAL DAILY
Qty: 30 TABLET | Refills: 0 | Status: SHIPPED | OUTPATIENT
Start: 2023-01-11 | End: 2023-01-17 | Stop reason: SDUPTHER

## 2023-01-11 NOTE — PROGRESS NOTES
"Subjective:       Patient ID: Isaías Prasad is a 57 y.o. male.    Chief Complaint: Congestive Heart Failure (ED follow up)    Pt is a 58 yo M presenting to clinic today for a postwards follow up; PMH includes HTN, HLD, H/O TIA in 2019, and tobacco use. Pt was admitted on 12/14/22 for new onset CHF; echo showed an EF of 20%. Was discharged home on Entresto, Coreg, High dose statin, aspirin and Lasix; admits compliance with medications. Also discharged with a lifevest which pt admits he has stopped wearing because it was uncomfortable. Denied any persistent or new episodes of SOB, chest pain, lower extremity swelling, or palpitations. Has been feeling well since discharge; upcoming cardiology apt on 2/2/23.     Specialists:   Cardiology apt on 2/2/23    Review of Systems   Constitutional:  Negative for activity change and fatigue.   Respiratory:  Negative for cough, chest tightness and shortness of breath.    Gastrointestinal:  Negative for abdominal pain.   Neurological:  Negative for dizziness, weakness and light-headedness.   Psychiatric/Behavioral:  Negative for sleep disturbance.        Objective:      Vitals:    01/11/23 1341   BP: 126/73   BP Location: Left arm   Patient Position: Sitting   BP Method: Medium (Automatic)   Pulse: 83   Resp: 18   Temp: 97.9 °F (36.6 °C)   TempSrc: Oral   SpO2: 99%   Weight: 86.9 kg (191 lb 8 oz)   Height: 6' 2.02" (1.88 m)      Physical Exam  Constitutional:       Appearance: Normal appearance.   Cardiovascular:      Rate and Rhythm: Normal rate and regular rhythm.      Heart sounds: Normal heart sounds. No murmur heard.  Pulmonary:      Effort: Pulmonary effort is normal. No respiratory distress.      Breath sounds: Normal breath sounds. No wheezing.   Chest:      Chest wall: No tenderness.   Abdominal:      General: Bowel sounds are normal. There is no distension.      Palpations: Abdomen is soft.      Tenderness: There is no abdominal tenderness. There is no guarding or " rebound.   Musculoskeletal:      Right lower leg: No edema.      Left lower leg: No edema.   Skin:     General: Skin is warm.   Neurological:      Mental Status: He is oriented to person, place, and time.   Psychiatric:         Mood and Affect: Mood normal.         Behavior: Behavior normal.       Assessment/Plan:  Acute congestive heart failure, unspecified heart failure type  Pt feeling well; denied any persistent symptoms of SOB, chest pain, or lower extremity swelling   Cardiology apt on 2/223; expressed importance of apt  Explained importance of lifevest to patient; especially since angiogram has not been conducted yet   Given 1 month of medications upon discharge; refills sent today to get him through until he sees Cardiologist  Strict ED precautions discussed in depth again; pt expressed understanding   -     aspirin 81 MG Chew; Take 1 tablet (81 mg total) by mouth once daily.  Dispense: 30 tablet; Refill: 0  -     atorvastatin (LIPITOR) 80 MG tablet; Take 0.5 tablets (40 mg total) by mouth once daily.  Dispense: 15 tablet; Refill: 0  -     furosemide (LASIX) 20 MG tablet; Take 1 tablet (20 mg total) by mouth 2 (two) times daily.  Dispense: 60 tablet; Refill: 0  -     metoprolol succinate (TOPROL-XL) 25 MG 24 hr tablet; Take 1 tablet (25 mg total) by mouth once daily.  Dispense: 30 tablet; Refill: 0  -     sacubitriL-valsartan (ENTRESTO) 24-26 mg per tablet; Take 1 tablet by mouth 2 (two) times daily.  Dispense: 60 tablet; Refill: 0  -     Ambulatory referral/consult to Family Practice; Future; Expected date: 01/18/2023      Pt given referral for PCP to establish care.       Naveed Dang MD  \[facility]\"" Family Medicine [facility]

## 2023-01-12 NOTE — PROGRESS NOTES
Faculty Attestation: Isaías ELIER Felipees  was seen in [facility] for post[place]hospitalization discharge evaluation. Patient seen and evaluated at the time of the visit. History of Present Illness, Physical Exam, and Assessment and Plan reviewed. Treatment plan is reasonable and appropriate. Compliance with treatment recommendations is important.       Isabelle Boateng MD  Family/Sports Medicine

## 2023-01-17 ENCOUNTER — TELEPHONE (OUTPATIENT)
Dept: OBGYN | Facility: CLINIC | Age: 58
End: 2023-01-17
Payer: MEDICAID

## 2023-01-17 DIAGNOSIS — I50.9 ACUTE CONGESTIVE HEART FAILURE, UNSPECIFIED HEART FAILURE TYPE: ICD-10-CM

## 2023-01-17 RX ORDER — FUROSEMIDE 20 MG/1
20 TABLET ORAL 2 TIMES DAILY
Qty: 60 TABLET | Refills: 0 | Status: SHIPPED | OUTPATIENT
Start: 2023-01-17 | End: 2023-02-22 | Stop reason: SDUPTHER

## 2023-01-17 RX ORDER — ATORVASTATIN CALCIUM 80 MG/1
40 TABLET, FILM COATED ORAL DAILY
Qty: 15 TABLET | Refills: 0 | Status: SHIPPED | OUTPATIENT
Start: 2023-01-17 | End: 2023-02-22 | Stop reason: SDUPTHER

## 2023-01-17 RX ORDER — NAPROXEN SODIUM 220 MG/1
81 TABLET, FILM COATED ORAL DAILY
Qty: 30 TABLET | Refills: 0 | Status: SHIPPED | OUTPATIENT
Start: 2023-01-17 | End: 2023-02-22 | Stop reason: SDUPTHER

## 2023-01-17 RX ORDER — METOPROLOL SUCCINATE 25 MG/1
25 TABLET, EXTENDED RELEASE ORAL DAILY
Qty: 30 TABLET | Refills: 0 | Status: SHIPPED | OUTPATIENT
Start: 2023-01-17 | End: 2023-02-22 | Stop reason: SDUPTHER

## 2023-01-17 NOTE — TELEPHONE ENCOUNTER
Pt medications that were filled at postwards apt not picked up due to cost. Will resend to [facility] pharmacy today 1/17/23.     Naveed Dang MD  [facility]

## 2023-02-22 ENCOUNTER — OFFICE VISIT (OUTPATIENT)
Dept: FAMILY MEDICINE | Facility: CLINIC | Age: 58
End: 2023-02-22
Payer: MEDICAID

## 2023-02-22 VITALS
HEART RATE: 81 BPM | RESPIRATION RATE: 20 BRPM | DIASTOLIC BLOOD PRESSURE: 64 MMHG | BODY MASS INDEX: 24.64 KG/M2 | TEMPERATURE: 98 F | HEIGHT: 74 IN | WEIGHT: 192 LBS | SYSTOLIC BLOOD PRESSURE: 106 MMHG | OXYGEN SATURATION: 99 %

## 2023-02-22 DIAGNOSIS — Z72.0 TOBACCO USER: ICD-10-CM

## 2023-02-22 DIAGNOSIS — Z12.11 COLON CANCER SCREENING: Primary | ICD-10-CM

## 2023-02-22 DIAGNOSIS — I50.9 ACUTE CONGESTIVE HEART FAILURE, UNSPECIFIED HEART FAILURE TYPE: ICD-10-CM

## 2023-02-22 DIAGNOSIS — Z12.5 PROSTATE CANCER SCREENING: ICD-10-CM

## 2023-02-22 DIAGNOSIS — E78.49 OTHER HYPERLIPIDEMIA: ICD-10-CM

## 2023-02-22 DIAGNOSIS — F17.200 SMOKER: ICD-10-CM

## 2023-02-22 DIAGNOSIS — F17.210 CIGARETTE SMOKER: ICD-10-CM

## 2023-02-22 LAB
ALBUMIN SERPL-MCNC: 4.1 G/DL (ref 3.5–5)
ALBUMIN/GLOB SERPL: 1.2 RATIO (ref 1.1–2)
ALP SERPL-CCNC: 127 UNIT/L (ref 40–150)
ALT SERPL-CCNC: 34 UNIT/L (ref 0–55)
AST SERPL-CCNC: 21 UNIT/L (ref 5–34)
BASOPHILS # BLD AUTO: 0.08 X10(3)/MCL (ref 0–0.2)
BASOPHILS NFR BLD AUTO: 0.9 %
BILIRUBIN DIRECT+TOT PNL SERPL-MCNC: 0.6 MG/DL
BUN SERPL-MCNC: 36.3 MG/DL (ref 8.4–25.7)
CALCIUM SERPL-MCNC: 9.6 MG/DL (ref 8.4–10.2)
CHLORIDE SERPL-SCNC: 103 MMOL/L (ref 98–107)
CHOLEST SERPL-MCNC: 157 MG/DL
CHOLEST/HDLC SERPL: 3 {RATIO} (ref 0–5)
CO2 SERPL-SCNC: 27 MMOL/L (ref 22–29)
CREAT SERPL-MCNC: 1.42 MG/DL (ref 0.73–1.18)
EOSINOPHIL # BLD AUTO: 0.38 X10(3)/MCL (ref 0–0.9)
EOSINOPHIL NFR BLD AUTO: 4.1 %
ERYTHROCYTE [DISTWIDTH] IN BLOOD BY AUTOMATED COUNT: 14 % (ref 11.5–17)
EST. AVERAGE GLUCOSE BLD GHB EST-MCNC: 134.1 MG/DL
GFR SERPLBLD CREATININE-BSD FMLA CKD-EPI: 58 MLS/MIN/1.73/M2
GLOBULIN SER-MCNC: 3.3 GM/DL (ref 2.4–3.5)
GLUCOSE SERPL-MCNC: 104 MG/DL (ref 74–100)
HBA1C MFR BLD: 6.3 %
HCT VFR BLD AUTO: 41.6 % (ref 42–52)
HDLC SERPL-MCNC: 51 MG/DL (ref 35–60)
HGB BLD-MCNC: 14.5 G/DL (ref 14–18)
IMM GRANULOCYTES # BLD AUTO: 0.04 X10(3)/MCL (ref 0–0.04)
IMM GRANULOCYTES NFR BLD AUTO: 0.4 %
LDLC SERPL CALC-MCNC: 77 MG/DL (ref 50–140)
LYMPHOCYTES # BLD AUTO: 2.58 X10(3)/MCL (ref 0.6–4.6)
LYMPHOCYTES NFR BLD AUTO: 27.7 %
MCH RBC QN AUTO: 31.6 PG
MCHC RBC AUTO-ENTMCNC: 34.9 G/DL (ref 33–36)
MCV RBC AUTO: 90.6 FL (ref 80–94)
MONOCYTES # BLD AUTO: 0.62 X10(3)/MCL (ref 0.1–1.3)
MONOCYTES NFR BLD AUTO: 6.7 %
NEUTROPHILS # BLD AUTO: 5.6 X10(3)/MCL (ref 2.1–9.2)
NEUTROPHILS NFR BLD AUTO: 60.2 %
NRBC BLD AUTO-RTO: 0 %
PLATELET # BLD AUTO: 272 X10(3)/MCL (ref 130–400)
PMV BLD AUTO: 9.6 FL (ref 7.4–10.4)
POTASSIUM SERPL-SCNC: 4.4 MMOL/L (ref 3.5–5.1)
PROT SERPL-MCNC: 7.4 GM/DL (ref 6.4–8.3)
PSA SERPL-MCNC: 1.38 NG/ML
RBC # BLD AUTO: 4.59 X10(6)/MCL (ref 4.7–6.1)
SODIUM SERPL-SCNC: 138 MMOL/L (ref 136–145)
T4 FREE SERPL-MCNC: 0.8 NG/DL (ref 0.7–1.48)
TRIGL SERPL-MCNC: 145 MG/DL (ref 34–140)
TSH SERPL-ACNC: 0.12 UIU/ML (ref 0.35–4.94)
VLDLC SERPL CALC-MCNC: 29 MG/DL
WBC # SPEC AUTO: 9.3 X10(3)/MCL (ref 4.5–11.5)

## 2023-02-22 PROCEDURE — 3074F SYST BP LT 130 MM HG: CPT | Mod: CPTII,,, | Performed by: STUDENT IN AN ORGANIZED HEALTH CARE EDUCATION/TRAINING PROGRAM

## 2023-02-22 PROCEDURE — 36415 COLL VENOUS BLD VENIPUNCTURE: CPT | Performed by: STUDENT IN AN ORGANIZED HEALTH CARE EDUCATION/TRAINING PROGRAM

## 2023-02-22 PROCEDURE — 3078F DIAST BP <80 MM HG: CPT | Mod: CPTII,,, | Performed by: STUDENT IN AN ORGANIZED HEALTH CARE EDUCATION/TRAINING PROGRAM

## 2023-02-22 PROCEDURE — 4010F PR ACE/ARB THEARPY RXD/TAKEN: ICD-10-PCS | Mod: CPTII,,, | Performed by: STUDENT IN AN ORGANIZED HEALTH CARE EDUCATION/TRAINING PROGRAM

## 2023-02-22 PROCEDURE — 85025 COMPLETE CBC W/AUTO DIFF WBC: CPT | Performed by: STUDENT IN AN ORGANIZED HEALTH CARE EDUCATION/TRAINING PROGRAM

## 2023-02-22 PROCEDURE — 84153 ASSAY OF PSA TOTAL: CPT | Performed by: STUDENT IN AN ORGANIZED HEALTH CARE EDUCATION/TRAINING PROGRAM

## 2023-02-22 PROCEDURE — 3078F PR MOST RECENT DIASTOLIC BLOOD PRESSURE < 80 MM HG: ICD-10-PCS | Mod: CPTII,,, | Performed by: STUDENT IN AN ORGANIZED HEALTH CARE EDUCATION/TRAINING PROGRAM

## 2023-02-22 PROCEDURE — 99214 OFFICE O/P EST MOD 30 MIN: CPT | Mod: S$PBB,,, | Performed by: STUDENT IN AN ORGANIZED HEALTH CARE EDUCATION/TRAINING PROGRAM

## 2023-02-22 PROCEDURE — 80061 LIPID PANEL: CPT | Performed by: STUDENT IN AN ORGANIZED HEALTH CARE EDUCATION/TRAINING PROGRAM

## 2023-02-22 PROCEDURE — 84443 ASSAY THYROID STIM HORMONE: CPT | Performed by: STUDENT IN AN ORGANIZED HEALTH CARE EDUCATION/TRAINING PROGRAM

## 2023-02-22 PROCEDURE — 3008F BODY MASS INDEX DOCD: CPT | Mod: CPTII,,, | Performed by: STUDENT IN AN ORGANIZED HEALTH CARE EDUCATION/TRAINING PROGRAM

## 2023-02-22 PROCEDURE — 3074F PR MOST RECENT SYSTOLIC BLOOD PRESSURE < 130 MM HG: ICD-10-PCS | Mod: CPTII,,, | Performed by: STUDENT IN AN ORGANIZED HEALTH CARE EDUCATION/TRAINING PROGRAM

## 2023-02-22 PROCEDURE — 4010F ACE/ARB THERAPY RXD/TAKEN: CPT | Mod: CPTII,,, | Performed by: STUDENT IN AN ORGANIZED HEALTH CARE EDUCATION/TRAINING PROGRAM

## 2023-02-22 PROCEDURE — 80053 COMPREHEN METABOLIC PANEL: CPT | Performed by: STUDENT IN AN ORGANIZED HEALTH CARE EDUCATION/TRAINING PROGRAM

## 2023-02-22 PROCEDURE — 84439 ASSAY OF FREE THYROXINE: CPT | Performed by: STUDENT IN AN ORGANIZED HEALTH CARE EDUCATION/TRAINING PROGRAM

## 2023-02-22 PROCEDURE — 99214 PR OFFICE/OUTPT VISIT, EST, LEVL IV, 30-39 MIN: ICD-10-PCS | Mod: S$PBB,,, | Performed by: STUDENT IN AN ORGANIZED HEALTH CARE EDUCATION/TRAINING PROGRAM

## 2023-02-22 PROCEDURE — 83036 HEMOGLOBIN GLYCOSYLATED A1C: CPT | Performed by: STUDENT IN AN ORGANIZED HEALTH CARE EDUCATION/TRAINING PROGRAM

## 2023-02-22 PROCEDURE — 1159F PR MEDICATION LIST DOCUMENTED IN MEDICAL RECORD: ICD-10-PCS | Mod: CPTII,,, | Performed by: STUDENT IN AN ORGANIZED HEALTH CARE EDUCATION/TRAINING PROGRAM

## 2023-02-22 PROCEDURE — 3008F PR BODY MASS INDEX (BMI) DOCUMENTED: ICD-10-PCS | Mod: CPTII,,, | Performed by: STUDENT IN AN ORGANIZED HEALTH CARE EDUCATION/TRAINING PROGRAM

## 2023-02-22 PROCEDURE — 1159F MED LIST DOCD IN RCRD: CPT | Mod: CPTII,,, | Performed by: STUDENT IN AN ORGANIZED HEALTH CARE EDUCATION/TRAINING PROGRAM

## 2023-02-22 PROCEDURE — 99214 OFFICE O/P EST MOD 30 MIN: CPT | Mod: PBBFAC,PN | Performed by: STUDENT IN AN ORGANIZED HEALTH CARE EDUCATION/TRAINING PROGRAM

## 2023-02-22 RX ORDER — NICOTINE 7MG/24HR
1 PATCH, TRANSDERMAL 24 HOURS TRANSDERMAL DAILY
Qty: 30 PATCH | Refills: 0 | Status: SHIPPED | OUTPATIENT
Start: 2023-04-23

## 2023-02-22 RX ORDER — IBUPROFEN 200 MG
1 TABLET ORAL DAILY
Qty: 30 PATCH | Refills: 0 | Status: SHIPPED | OUTPATIENT
Start: 2023-03-24

## 2023-02-22 RX ORDER — IBUPROFEN 200 MG
1 TABLET ORAL DAILY
Qty: 30 PATCH | Refills: 0 | Status: SHIPPED | OUTPATIENT
Start: 2023-02-22

## 2023-02-22 RX ORDER — FUROSEMIDE 20 MG/1
20 TABLET ORAL 2 TIMES DAILY
Qty: 60 TABLET | Refills: 6 | Status: SHIPPED | OUTPATIENT
Start: 2023-02-22 | End: 2023-05-30 | Stop reason: SDUPTHER

## 2023-02-22 RX ORDER — ATORVASTATIN CALCIUM 40 MG/1
40 TABLET, FILM COATED ORAL DAILY
Qty: 30 TABLET | Refills: 6 | Status: SHIPPED | OUTPATIENT
Start: 2023-02-22 | End: 2023-05-30 | Stop reason: SDUPTHER

## 2023-02-22 RX ORDER — METOPROLOL SUCCINATE 25 MG/1
25 TABLET, EXTENDED RELEASE ORAL DAILY
Qty: 30 TABLET | Refills: 6 | Status: SHIPPED | OUTPATIENT
Start: 2023-02-22 | End: 2023-05-30 | Stop reason: SDUPTHER

## 2023-02-22 RX ORDER — NAPROXEN SODIUM 220 MG/1
81 TABLET, FILM COATED ORAL DAILY
Qty: 30 TABLET | Refills: 6 | Status: SHIPPED | OUTPATIENT
Start: 2023-02-22 | End: 2023-07-11 | Stop reason: SDUPTHER

## 2023-02-22 NOTE — PROGRESS NOTES
Patient Name: Isaías Prasad   : 1965  MRN: [id number]     Subjective:   Patient ID: Isaías Prasad is a 57 y.o. male.    Chief Complaint: No chief complaint on file.       HPI: HPI  57 year old male presents to clinic to establish care. Patient did not have previous PCP    Patient has a complex past medical history of hypertension, hyperlipidemia, TIA in 2019  Tobacco and polysubstance use , colostomy with reversal,      EF 20%  Presented to   ED 2022 with new onset shortness of breath  Was found to be in congestive heart failure with EF of 20%  Patient started on Entresto     metoprolol succinate 25 mg   Lasix 40 mg daily by Cardiology  States that he did go to cardiology 2023 but clinic notes that he left without being seen.   Patient states he is able to lie flat without shortness of breath and edema is now resolved  Was discharged home with LifeVest but patient states he has not been wearing     Cigarette use  Would like referral to smoking cessation  /2 ppd for 31 years      Hyperlipidemia   Lipitor 40  Aspirin 81 mg    Med hx as above  Social hx- Smoker. Works as a    ROS:  Review of Systems   Constitutional:  Negative for chills and fever.   HENT:  Negative for sore throat.    Respiratory:  Negative for shortness of breath and wheezing.    Cardiovascular:  Negative for chest pain, palpitations and leg swelling.   Gastrointestinal:  Negative for constipation, diarrhea and heartburn.   Genitourinary:  Negative for frequency and urgency.   Musculoskeletal:  Negative for back pain and myalgias.   Skin:  Negative for itching and rash.   Neurological:  Negative for dizziness, focal weakness and headaches.   Psychiatric/Behavioral:  The patient is not nervous/anxious.     History:     Past Medical History:   Diagnosis Date    Disruption of external operation (surgical) wound, not elsewhere classified, initial encounter 2022    Open abdominal wall wound 2022    Status post  colostomy 5/30/2022    Tobacco user 5/26/2022    Transient ischemic attack 5/26/2022      Past Surgical History:   Procedure Laterality Date    COLOSTOMY      EXAMINATION UNDER ANESTHESIA N/A 5/18/2022    Procedure: Exam under anesthesia, removal of rectal foreign body;  Surgeon: Chauncey Jimenez Jr., MD;  Location: [facility];  Service: General;  Laterality: N/A;  David/Scheuermann, possible ex lap     No family history on file.   Social History     Tobacco Use    Smoking status: Every Day     Packs/day: 0.50     Years: 9.00     Pack years: 4.50     Types: Cigarettes    Smokeless tobacco: Never    Tobacco comments:     Smoked for 6 years then quit and started back smoking now for 3     years   Substance and Sexual Activity    Alcohol use: Not Currently     Alcohol/week: 1.0 standard drink     Types: 1 Drinks containing 0.5 oz of alcohol per week     Comment: twice a week    Drug use: Not Currently     Types: Marijuana     Comment: monthly    Sexual activity: Not on file        Allergies: Review of patient's allergies indicates:  No Known Allergies  Objective:   There were no vitals filed for this visit.  There is no height or weight on file to calculate BMI.     Physical Examination:   Physical Exam  Constitutional:       General: He is not in acute distress.  Eyes:      General: No scleral icterus.     Extraocular Movements: Extraocular movements intact.      Conjunctiva/sclera: Conjunctivae normal.      Pupils: Pupils are equal, round, and reactive to light.   Cardiovascular:      Rate and Rhythm: Normal rate and regular rhythm.      Heart sounds: No murmur heard.     Comments: No edema bilaterally to lower extremities   Pulmonary:      Effort: Pulmonary effort is normal. No respiratory distress.      Breath sounds: No stridor. No wheezing or rhonchi.   Abdominal:      General: Bowel sounds are normal. There is no distension.      Palpations: Abdomen is soft.      Tenderness: There is no abdominal tenderness.  There is no guarding.   Musculoskeletal:         General: No swelling. Normal range of motion.   Skin:     General: Skin is warm and dry.      Coloration: Skin is not jaundiced.      Findings: No rash.   Neurological:      Mental Status: He is alert.      Gait: Gait normal.   Psychiatric:         Thought Content: Thought content normal.       Assessment:     1. Acute congestive heart failure, unspecified heart failure type        Plan:     Problem List Items Addressed This Visit          Cardiac/Vascular    Other hyperlipidemia    Overview     ASA 81 mg  Atorvastatin 40 mg daily          Relevant Medications    aspirin 81 MG Chew    atorvastatin (LIPITOR) 40 MG tablet    Acute congestive heart failure    Overview     Called cardiology department and patient noted to have left without being seen on 02/02/2023  They will call patient to try to reschedule  Patient is not wearing LifeVest has been urged to do so    Continuing Entresto  24/261 tablet b.i.d. metoprolol succinate  25 mg XL Lasix 20 mg b.i.d.         Relevant Medications    aspirin 81 MG Chew    atorvastatin (LIPITOR) 40 MG tablet    furosemide (LASIX) 20 MG tablet    metoprolol succinate (TOPROL-XL) 25 MG 24 hr tablet    sacubitriL-valsartan (ENTRESTO) 24-26 mg per tablet    Other Relevant Orders    CBC Auto Differential    Comprehensive Metabolic Panel    Lipid Panel    T4, Free    TSH    Urinalysis    Hemoglobin A1C    Ambulatory referral/consult to Smoking Cessation Program       Other    Tobacco user    Overview     S         Cigarette smoker    Overview     Discussed smoking cessation with patient for 3 minutes.  Will send 21 mg nicotine patches and refer to smoking cessation  Congratulated patient on desire to quit         Relevant Medications    nicotine (NICODERM CQ) 21 mg/24 hr     Other Visit Diagnoses       Colon cancer screening    -  Primary    Relevant Orders    Cologuard Screening (Multitarget Stool DNA)    Prostate cancer screening         Relevant Orders    PSA, Screening    Smoker        Relevant Medications    nicotine (NICODERM CQ) 21 mg/24 hr    nicotine (NICODERM CQ) 14 mg/24 hr (Start on 3/24/2023)    nicotine (NICODERM CQ) 7 mg/24 hr (Start on 4/23/2023)    Other Relevant Orders    Ambulatory referral/consult to Smoking Cessation Program    Ambulatory referral/consult to Smoking Cessation Program           Problem List Items Addressed This Visit          Cardiac/Vascular    Acute congestive heart failure        Follow up in about 2 weeks (around 3/8/2023) for Virtual Visit, lab results.

## 2023-03-29 ENCOUNTER — OFFICE VISIT (OUTPATIENT)
Dept: FAMILY MEDICINE | Facility: CLINIC | Age: 58
End: 2023-03-29
Payer: MEDICAID

## 2023-03-29 DIAGNOSIS — I50.9 ACUTE CONGESTIVE HEART FAILURE, UNSPECIFIED HEART FAILURE TYPE: ICD-10-CM

## 2023-03-29 DIAGNOSIS — R73.02 IMPAIRED GLUCOSE TOLERANCE: Primary | ICD-10-CM

## 2023-03-29 DIAGNOSIS — Z72.0 TOBACCO USER: ICD-10-CM

## 2023-03-29 PROCEDURE — 4010F ACE/ARB THERAPY RXD/TAKEN: CPT | Mod: CPTII,95,, | Performed by: STUDENT IN AN ORGANIZED HEALTH CARE EDUCATION/TRAINING PROGRAM

## 2023-03-29 PROCEDURE — 4010F PR ACE/ARB THEARPY RXD/TAKEN: ICD-10-PCS | Mod: CPTII,95,, | Performed by: STUDENT IN AN ORGANIZED HEALTH CARE EDUCATION/TRAINING PROGRAM

## 2023-03-29 PROCEDURE — 99214 PR OFFICE/OUTPT VISIT, EST, LEVL IV, 30-39 MIN: ICD-10-PCS | Mod: 95,,, | Performed by: STUDENT IN AN ORGANIZED HEALTH CARE EDUCATION/TRAINING PROGRAM

## 2023-03-29 PROCEDURE — 99214 OFFICE O/P EST MOD 30 MIN: CPT | Mod: 95,,, | Performed by: STUDENT IN AN ORGANIZED HEALTH CARE EDUCATION/TRAINING PROGRAM

## 2023-03-29 RX ORDER — METFORMIN HYDROCHLORIDE 500 MG/1
500 TABLET, EXTENDED RELEASE ORAL
Qty: 90 TABLET | Refills: 3 | Status: SHIPPED | OUTPATIENT
Start: 2023-03-29 | End: 2023-05-30 | Stop reason: SDUPTHER

## 2023-03-29 NOTE — PROGRESS NOTES
Audio Only Telehealth Visit     The patient location is: home   The chief complaint leading to consultation is: lab results   Visit type: Virtual visit with audio only (telephone)  Total time spent with patient: 15 minutes      The reason for the audio only service rather than synchronous audio and video virtual visit was related to technical difficulties or patient preference/necessity.     Each patient to whom I provide medical services by telemedicine is:  (1) informed of the relationship between the physician and patient and the respective role of any other health care provider with respect to management of the patient; and (2) notified that they may decline to receive medical services by telemedicine and may withdraw from such care at any time. Patient verbally consented to receive this service via voice-only telephone call.       HPI:   57 year old male presents for follow up.  Patient did not have previous PCP     Patient has a complex past medical history of hypertension, hyperlipidemia, TIA in 2019  Tobacco and polysubstance use , colostomy with reversal,       EF 20%  Presented to   ED 12/14/2022 with new onset shortness of breath  Was found to be in congestive heart failure with EF of 20%  Patient started on Entresto  24/26   metoprolol succinate 25 mg   Lasix 40 mg daily by Cardiology  States that he did go to cardiology 2/2/2023 but clinic notes that he left without being seen.   Patient states he is able to lie flat without shortness of breath and edema is now resolved  Was discharged home with LifeVest but patient states he has not been wearing   States that since he started Entresto he has been feeling better and would like referral back to Cardiology     Cigarette use  Would like referral to smoking cessation  1/2 ppd for 31 years   Referred 1st visit   Hyperlipidemia   Lipitor 40  Aspirin 81 mg    Impaired glucose tolerance  A1c 6.1   Patient is amenable to starting metformin     Med hx as  above  Social hx- Smoker. Works as a         Assessment and plan:           Problem List Items Addressed This Visit          Cardiac/Vascular    Acute congestive heart failure    Overview     Called cardiology department and patient noted to have left without being seen on 02/02/2023  Placed new referral for patientPatient is not wearing LifeVest has been urged to do so    Continuing Entresto  24/261 tablet b.i.d. metoprolol succinate  25 mg XL Lasix 20 mg b.i.d.            Endocrine    Impaired glucose tolerance - Primary    Overview     Starting metformin 500 mg extended release with medication adverse effects with patient         Relevant Medications    metFORMIN (GLUCOPHAGE-XR) 500 MG ER 24hr tablet    Other Relevant Orders    Ambulatory referral/consult to Cardiology       Other    Tobacco user    Overview     Referring again to smoking cessation                 Follow up in about 2 months (around 5/29/2023) for A1c and labs for CHf.              This service was not originating from a related E/M service provided within the previous 7 days nor will  to an E/M service or procedure within the next 24 hours or my soonest available appointment.  Prevailing standard of care was able to be met in this audio-only visit.

## 2023-05-18 ENCOUNTER — PATIENT MESSAGE (OUTPATIENT)
Dept: ADMINISTRATIVE | Facility: HOSPITAL | Age: 58
End: 2023-05-18
Payer: MEDICAID

## 2023-05-30 ENCOUNTER — OFFICE VISIT (OUTPATIENT)
Dept: FAMILY MEDICINE | Facility: CLINIC | Age: 58
End: 2023-05-30
Payer: MEDICAID

## 2023-05-30 VITALS
OXYGEN SATURATION: 98 % | SYSTOLIC BLOOD PRESSURE: 119 MMHG | DIASTOLIC BLOOD PRESSURE: 77 MMHG | HEART RATE: 100 BPM | WEIGHT: 191.19 LBS | TEMPERATURE: 98 F | BODY MASS INDEX: 24.54 KG/M2 | HEIGHT: 74 IN | RESPIRATION RATE: 20 BRPM

## 2023-05-30 DIAGNOSIS — R73.02 IMPAIRED GLUCOSE TOLERANCE: ICD-10-CM

## 2023-05-30 DIAGNOSIS — N18.31 STAGE 3A CHRONIC KIDNEY DISEASE: ICD-10-CM

## 2023-05-30 DIAGNOSIS — E78.49 OTHER HYPERLIPIDEMIA: ICD-10-CM

## 2023-05-30 DIAGNOSIS — I50.21 ACUTE SYSTOLIC CONGESTIVE HEART FAILURE: Primary | ICD-10-CM

## 2023-05-30 DIAGNOSIS — I50.9 ACUTE CONGESTIVE HEART FAILURE, UNSPECIFIED HEART FAILURE TYPE: ICD-10-CM

## 2023-05-30 LAB
ALBUMIN SERPL-MCNC: 4.3 G/DL (ref 3.5–5)
ALBUMIN/GLOB SERPL: 1.4 RATIO (ref 1.1–2)
ALP SERPL-CCNC: 104 UNIT/L (ref 40–150)
ALT SERPL-CCNC: 27 UNIT/L (ref 0–55)
AST SERPL-CCNC: 23 UNIT/L (ref 5–34)
BILIRUBIN DIRECT+TOT PNL SERPL-MCNC: 0.3 MG/DL
BUN SERPL-MCNC: 28.7 MG/DL (ref 8.4–25.7)
CALCIUM SERPL-MCNC: 9.8 MG/DL (ref 8.4–10.2)
CHLORIDE SERPL-SCNC: 103 MMOL/L (ref 98–107)
CO2 SERPL-SCNC: 27 MMOL/L (ref 22–29)
CREAT SERPL-MCNC: 1.5 MG/DL (ref 0.73–1.18)
EST. AVERAGE GLUCOSE BLD GHB EST-MCNC: 102.5 MG/DL
GFR SERPLBLD CREATININE-BSD FMLA CKD-EPI: 54 MLS/MIN/1.73/M2
GLOBULIN SER-MCNC: 3 GM/DL (ref 2.4–3.5)
GLUCOSE SERPL-MCNC: 119 MG/DL (ref 74–100)
HBA1C MFR BLD: 5.2 %
MAGNESIUM SERPL-MCNC: 2 MG/DL (ref 1.6–2.6)
POTASSIUM SERPL-SCNC: 4.7 MMOL/L (ref 3.5–5.1)
PROT SERPL-MCNC: 7.3 GM/DL (ref 6.4–8.3)
SODIUM SERPL-SCNC: 137 MMOL/L (ref 136–145)

## 2023-05-30 PROCEDURE — 4010F ACE/ARB THERAPY RXD/TAKEN: CPT | Mod: CPTII,,, | Performed by: STUDENT IN AN ORGANIZED HEALTH CARE EDUCATION/TRAINING PROGRAM

## 2023-05-30 PROCEDURE — 83036 HEMOGLOBIN GLYCOSYLATED A1C: CPT | Performed by: STUDENT IN AN ORGANIZED HEALTH CARE EDUCATION/TRAINING PROGRAM

## 2023-05-30 PROCEDURE — 99214 OFFICE O/P EST MOD 30 MIN: CPT | Mod: S$PBB,,, | Performed by: STUDENT IN AN ORGANIZED HEALTH CARE EDUCATION/TRAINING PROGRAM

## 2023-05-30 PROCEDURE — 4010F PR ACE/ARB THEARPY RXD/TAKEN: ICD-10-PCS | Mod: CPTII,,, | Performed by: STUDENT IN AN ORGANIZED HEALTH CARE EDUCATION/TRAINING PROGRAM

## 2023-05-30 PROCEDURE — 1159F PR MEDICATION LIST DOCUMENTED IN MEDICAL RECORD: ICD-10-PCS | Mod: CPTII,,, | Performed by: STUDENT IN AN ORGANIZED HEALTH CARE EDUCATION/TRAINING PROGRAM

## 2023-05-30 PROCEDURE — 99213 OFFICE O/P EST LOW 20 MIN: CPT | Mod: PBBFAC,PN | Performed by: STUDENT IN AN ORGANIZED HEALTH CARE EDUCATION/TRAINING PROGRAM

## 2023-05-30 PROCEDURE — 3074F PR MOST RECENT SYSTOLIC BLOOD PRESSURE < 130 MM HG: ICD-10-PCS | Mod: CPTII,,, | Performed by: STUDENT IN AN ORGANIZED HEALTH CARE EDUCATION/TRAINING PROGRAM

## 2023-05-30 PROCEDURE — 3074F SYST BP LT 130 MM HG: CPT | Mod: CPTII,,, | Performed by: STUDENT IN AN ORGANIZED HEALTH CARE EDUCATION/TRAINING PROGRAM

## 2023-05-30 PROCEDURE — 3008F BODY MASS INDEX DOCD: CPT | Mod: CPTII,,, | Performed by: STUDENT IN AN ORGANIZED HEALTH CARE EDUCATION/TRAINING PROGRAM

## 2023-05-30 PROCEDURE — 1159F MED LIST DOCD IN RCRD: CPT | Mod: CPTII,,, | Performed by: STUDENT IN AN ORGANIZED HEALTH CARE EDUCATION/TRAINING PROGRAM

## 2023-05-30 PROCEDURE — 99214 PR OFFICE/OUTPT VISIT, EST, LEVL IV, 30-39 MIN: ICD-10-PCS | Mod: S$PBB,,, | Performed by: STUDENT IN AN ORGANIZED HEALTH CARE EDUCATION/TRAINING PROGRAM

## 2023-05-30 PROCEDURE — 3078F DIAST BP <80 MM HG: CPT | Mod: CPTII,,, | Performed by: STUDENT IN AN ORGANIZED HEALTH CARE EDUCATION/TRAINING PROGRAM

## 2023-05-30 PROCEDURE — 36415 COLL VENOUS BLD VENIPUNCTURE: CPT | Performed by: STUDENT IN AN ORGANIZED HEALTH CARE EDUCATION/TRAINING PROGRAM

## 2023-05-30 PROCEDURE — 3008F PR BODY MASS INDEX (BMI) DOCUMENTED: ICD-10-PCS | Mod: CPTII,,, | Performed by: STUDENT IN AN ORGANIZED HEALTH CARE EDUCATION/TRAINING PROGRAM

## 2023-05-30 PROCEDURE — 3078F PR MOST RECENT DIASTOLIC BLOOD PRESSURE < 80 MM HG: ICD-10-PCS | Mod: CPTII,,, | Performed by: STUDENT IN AN ORGANIZED HEALTH CARE EDUCATION/TRAINING PROGRAM

## 2023-05-30 PROCEDURE — 80053 COMPREHEN METABOLIC PANEL: CPT | Performed by: STUDENT IN AN ORGANIZED HEALTH CARE EDUCATION/TRAINING PROGRAM

## 2023-05-30 PROCEDURE — 83735 ASSAY OF MAGNESIUM: CPT | Performed by: STUDENT IN AN ORGANIZED HEALTH CARE EDUCATION/TRAINING PROGRAM

## 2023-05-30 RX ORDER — METOPROLOL SUCCINATE 25 MG/1
25 TABLET, EXTENDED RELEASE ORAL DAILY
Qty: 90 TABLET | Refills: 3 | Status: SHIPPED | OUTPATIENT
Start: 2023-05-30 | End: 2023-07-11 | Stop reason: SDUPTHER

## 2023-05-30 RX ORDER — SACUBITRIL AND VALSARTAN 24; 26 MG/1; MG/1
1 TABLET, FILM COATED ORAL 2 TIMES DAILY
Qty: 60 TABLET | Refills: 6 | Status: SHIPPED | OUTPATIENT
Start: 2023-05-30 | End: 2023-07-11 | Stop reason: SDUPTHER

## 2023-05-30 RX ORDER — FUROSEMIDE 20 MG/1
20 TABLET ORAL 2 TIMES DAILY
Qty: 60 TABLET | Refills: 6 | Status: SHIPPED | OUTPATIENT
Start: 2023-05-30 | End: 2023-07-11 | Stop reason: SDUPTHER

## 2023-05-30 RX ORDER — METFORMIN HYDROCHLORIDE 500 MG/1
500 TABLET, EXTENDED RELEASE ORAL
Qty: 90 TABLET | Refills: 3 | Status: SHIPPED | OUTPATIENT
Start: 2023-05-30 | End: 2024-02-16

## 2023-05-30 RX ORDER — ATORVASTATIN CALCIUM 40 MG/1
40 TABLET, FILM COATED ORAL DAILY
Qty: 90 TABLET | Refills: 3 | Status: SHIPPED | OUTPATIENT
Start: 2023-05-30 | End: 2023-07-11 | Stop reason: SDUPTHER

## 2023-05-31 PROBLEM — N18.31 STAGE 3A CHRONIC KIDNEY DISEASE: Status: ACTIVE | Noted: 2023-05-31

## 2023-05-31 NOTE — PROGRESS NOTES
Patient Name: Isaías Prasad   : 1965  MRN: [id number]     Subjective:   Patient ID: Isaías Prasad is a 57 y.o. male.    Chief Complaint:   Chief Complaint   Patient presents with    Follow-up     F/u A1c, labs for CHF        HPI: HPI  57 year old male presents for follow up of CHF and impaired glucose tolerance Patient has a complex past medical history of hypertension, hyperlipidemia, TIA in 2019  Tobacco and polysubstance use , colostomy with reversal,       EF 20%  Presented to   ED 2022 with new onset shortness of breath  Was found to be in congestive heart failure with EF of 20%  Patient started on Entresto     metoprolol succinate 25 mg   Lasix 40 mg daily by Cardiology  Patient has new visit with cardiology next month  Still not wearing LifeVest states that he is feeling well     Cigarette use  Would like referral to smoking cessation  1/2 ppd for 31 years   Referred 1st visit     Hyperlipidemia   Lipitor 40  Aspirin 81 mg    Impaired glucose tolerance  A1c 6.1   Metformin 500 mg extended release    CKD III noted on labs      Med hx as above  Social hx- Smoker. Works as a    ROS:  ROS   History:     Past Medical History:   Diagnosis Date    Disruption of external operation (surgical) wound, not elsewhere classified, initial encounter 2022    Open abdominal wall wound 2022    Status post colostomy 2022    Tobacco user 2022    Transient ischemic attack 2022      Past Surgical History:   Procedure Laterality Date    COLOSTOMY      EXAMINATION UNDER ANESTHESIA N/A 2022    Procedure: Exam under anesthesia, removal of rectal foreign body;  Surgeon: Chauncey Jimenez Jr., MD;  Location: [facility];  Service: General;  Laterality: N/A;  David/Scheuermann, possible ex lap     History reviewed. No pertinent family history.   Social History     Tobacco Use    Smoking status: Some Days     Packs/day: 0.50     Years: 9.00     Pack years: 4.50     Types: Cigarettes     " Passive exposure: Never    Smokeless tobacco: Never    Tobacco comments:     Smoked for 6 years then quit and started back smoking now for 3     years   Substance and Sexual Activity    Alcohol use: Not Currently     Alcohol/week: 1.0 standard drink     Types: 1 Drinks containing 0.5 oz of alcohol per week     Comment: twice a week    Drug use: Not Currently     Types: Marijuana     Comment: monthly    Sexual activity: Yes     Partners: Female        Allergies: Review of patient's allergies indicates:  No Known Allergies  Objective:     Vitals:    05/30/23 1319   BP: 119/77   Pulse: 100   Resp: 20   Temp: 97.9 °F (36.6 °C)   SpO2: 98%   Weight: 86.7 kg (191 lb 3.2 oz)   Height: 6' 2" (1.88 m)   PainSc: 0-No pain     Body mass index is 24.55 kg/m².     Physical Examination:   Physical Exam    Assessment:     1. Acute systolic congestive heart failure    2. Impaired glucose tolerance    3. Acute congestive heart failure, unspecified heart failure type    4. Other hyperlipidemia    5. Stage 3a chronic kidney disease        Plan:     Problem List Items Addressed This Visit          Cardiac/Vascular    Other hyperlipidemia    Overview     ASA 81 mg  Atorvastatin 40 mg daily          Relevant Medications    atorvastatin (LIPITOR) 40 MG tablet    Acute congestive heart failure - Primary    Overview     Patient is not wearing LifeVest has been urged to do so    Continuing Entresto  24/261 tablet b.i.d. metoprolol succinate  25 mg XL Lasix 20 mg b.i.d.    CMP and Mag today         Relevant Medications    atorvastatin (LIPITOR) 40 MG tablet    furosemide (LASIX) 20 MG tablet    metoprolol succinate (TOPROL-XL) 25 MG 24 hr tablet    sacubitriL-valsartan (ENTRESTO) 24-26 mg per tablet    Other Relevant Orders    Comprehensive Metabolic Panel (Completed)    Magnesium (Completed)       Renal/    Stage 3a chronic kidney disease    Overview     Continuing to monitor.             Endocrine    Impaired glucose tolerance    " Overview     A1c today  Continue metformin 500 mg daily         Relevant Medications    metFORMIN (GLUCOPHAGE-XR) 500 MG ER 24hr tablet    Other Relevant Orders    Hemoglobin A1C (Completed)      Follow up in about 6 months (around 11/30/2023) for Diabetes.

## 2023-07-11 ENCOUNTER — OFFICE VISIT (OUTPATIENT)
Dept: CARDIOLOGY | Facility: CLINIC | Age: 58
End: 2023-07-11
Payer: MEDICAID

## 2023-07-11 VITALS
BODY MASS INDEX: 23.99 KG/M2 | RESPIRATION RATE: 17 BRPM | HEIGHT: 74 IN | HEART RATE: 95 BPM | WEIGHT: 186.94 LBS | DIASTOLIC BLOOD PRESSURE: 90 MMHG | SYSTOLIC BLOOD PRESSURE: 144 MMHG | OXYGEN SATURATION: 98 %

## 2023-07-11 DIAGNOSIS — R73.02 IMPAIRED GLUCOSE TOLERANCE: ICD-10-CM

## 2023-07-11 DIAGNOSIS — E78.49 OTHER HYPERLIPIDEMIA: ICD-10-CM

## 2023-07-11 DIAGNOSIS — I50.21 ACUTE SYSTOLIC CONGESTIVE HEART FAILURE: ICD-10-CM

## 2023-07-11 DIAGNOSIS — I50.9 ACUTE CONGESTIVE HEART FAILURE, UNSPECIFIED HEART FAILURE TYPE: ICD-10-CM

## 2023-07-11 PROCEDURE — 99215 OFFICE O/P EST HI 40 MIN: CPT | Mod: PBBFAC | Performed by: INTERNAL MEDICINE

## 2023-07-11 RX ORDER — FUROSEMIDE 20 MG/1
20 TABLET ORAL 2 TIMES DAILY
Qty: 60 TABLET | Refills: 6 | Status: SHIPPED | OUTPATIENT
Start: 2023-07-11 | End: 2024-02-16 | Stop reason: SDUPTHER

## 2023-07-11 RX ORDER — NAPROXEN SODIUM 220 MG/1
81 TABLET, FILM COATED ORAL DAILY
Qty: 90 TABLET | Refills: 6 | Status: SHIPPED | OUTPATIENT
Start: 2023-07-11 | End: 2025-04-01

## 2023-07-11 RX ORDER — ATORVASTATIN CALCIUM 40 MG/1
40 TABLET, FILM COATED ORAL DAILY
Qty: 90 TABLET | Refills: 6 | Status: SHIPPED | OUTPATIENT
Start: 2023-07-11 | End: 2025-04-01

## 2023-07-11 RX ORDER — SACUBITRIL AND VALSARTAN 24; 26 MG/1; MG/1
1 TABLET, FILM COATED ORAL 2 TIMES DAILY
Qty: 60 TABLET | Refills: 6 | Status: SHIPPED | OUTPATIENT
Start: 2023-07-11

## 2023-07-11 RX ORDER — METOPROLOL SUCCINATE 25 MG/1
25 TABLET, EXTENDED RELEASE ORAL DAILY
Qty: 90 TABLET | Refills: 6 | Status: SHIPPED | OUTPATIENT
Start: 2023-07-11 | End: 2025-04-01

## 2023-07-11 NOTE — PATIENT INSTRUCTIONS
Nurse visit in 3-4 weeks for BP check    New medication:  Jardiance 10mg once a day    - Take furosemide 20mg twice a day as prescribed    Follow up in 4 mths

## 2023-07-11 NOTE — PROGRESS NOTES
CHIEF COMPLAINT:   Chief Complaint   Patient presents with    Follow-up     New pt; ref for heart failure; c/o occasional SOB, fatigue; c/o calf cramping when walking far distances                   Review of patient's allergies indicates:  No Known Allergies                                       HPI:  Isaías Prasad 57 y.o. male PMH of CHF EF 20%, HTN, HLD, H/O TIA in 2019, and tobacco use. EF of 20%.  Mr. Prasad has no complaints other than SOB. He admits to being Short of breath with exertion. SOB is present with climbing stairs. Patient unable to walk a city block due to his shortness of breath. Patient feels fatigued. Patient uses two pillows to sleep. He was previously given a life vest that he did not use due to it being uncomfortable. Mr. Prasad is prescribed lasix twice a day but has been taking it only once a day.   He Denies chest pain, palpatations, orthopnea, lower leg edema.    Patient is still smoking. Is requesting help with quitting                                                                                                                                                                                      A1C 5.2  Chol 157  LDL 77  RCRI: 1 (H/O CHF)  MET: 2                                                                                                                                                                                                                                                                                                  CARDIAC TESTING:  Results for orders placed during the hospital encounter of 12/14/22    Echo    Interpretation Summary  · The left ventricle is mildly enlarged with eccentric hypertrophy and severely decreased systolic function.  · The estimated ejection fraction is 20%.  · Grade III left ventricular diastolic dysfunction.  · Moderate right atrial enlargement.  · Mild mitral regurgitation.  · Mild to moderate tricuspid regurgitation.  · With mildly  reduced right ventricular systolic function.  · Mild to moderate left atrial enlargement.  · There is pulmonary hypertension.    No results found for this or any previous visit.     No results found for this or any previous visit.       Patient Active Problem List   Diagnosis    Hypertension    Tobacco user    Transient ischemic attack    Disruption of external operation (surgical) wound, not elsewhere classified, initial encounter    Open abdominal wall wound    Status post colostomy    S/P colostomy takedown    Acute congestive heart failure    Dyspnea    Methamphetamine abuse    Other hyperlipidemia    Cigarette smoker    Impaired glucose tolerance    Stage 3a chronic kidney disease     Past Surgical History:   Procedure Laterality Date    COLOSTOMY      EXAMINATION UNDER ANESTHESIA N/A 5/18/2022    Procedure: Exam under anesthesia, removal of rectal foreign body;  Surgeon: Chauncey Jimenez Jr., MD;  Location: [facility];  Service: General;  Laterality: N/A;  David/Scheuermann, possible ex lap     Social History     Socioeconomic History    Marital status: Significant Other   Tobacco Use    Smoking status: Some Days     Packs/day: 0.50     Years: 9.00     Pack years: 4.50     Types: Cigarettes     Passive exposure: Never    Smokeless tobacco: Never    Tobacco comments:     Smoked for 6 years then quit and started back smoking now for 3     years   Substance and Sexual Activity    Alcohol use: Not Currently     Alcohol/week: 7.0 standard drinks     Types: 6 Cans of beer, 1 Drinks containing 0.5 oz of alcohol per week     Comment: twice a week    Drug use: Not Currently     Types: Marijuana, Cocaine     Comment: last cocaine use x1 yr ago, smokes marijana weekly    Sexual activity: Yes     Partners: Female        History reviewed. No pertinent family history.      Current Outpatient Medications:     atorvastatin (LIPITOR) 40 MG tablet, Take 1 tablet (40 mg total) by mouth once daily., Disp: 90 tablet, Rfl: 3     "furosemide (LASIX) 20 MG tablet, Take 1 tablet (20 mg total) by mouth 2 (two) times daily. (Patient taking differently: Take 20 mg by mouth 2 (two) times daily. Takes once a day), Disp: 60 tablet, Rfl: 6    metFORMIN (GLUCOPHAGE-XR) 500 MG ER 24hr tablet, Take 1 tablet (500 mg total) by mouth daily with breakfast., Disp: 90 tablet, Rfl: 3    metoprolol succinate (TOPROL-XL) 25 MG 24 hr tablet, Take 1 tablet (25 mg total) by mouth once daily., Disp: 90 tablet, Rfl: 3    sacubitriL-valsartan (ENTRESTO) 24-26 mg per tablet, Take 1 tablet by mouth 2 (two) times daily., Disp: 60 tablet, Rfl: 6    aspirin 81 MG Chew, Take 1 tablet (81 mg total) by mouth once daily., Disp: 30 tablet, Rfl: 6    nicotine (NICODERM CQ) 14 mg/24 hr, Place 1 patch onto the skin once daily. (Patient not taking: Reported on 7/11/2023), Disp: 30 patch, Rfl: 0    nicotine (NICODERM CQ) 21 mg/24 hr, Place 1 patch onto the skin once daily. (Patient not taking: Reported on 5/30/2023), Disp: 30 patch, Rfl: 0    nicotine (NICODERM CQ) 7 mg/24 hr, Place 1 patch onto the skin once daily. (Patient not taking: Reported on 5/30/2023), Disp: 30 patch, Rfl: 0     ROS:                                                                                                                                                                             Constitutional: no fever/chills  EENT: no sore throat, ear pain, sinus pain/congestion, nasal congestion/drainage  Respiratory: no cough, no wheezing.  +ve shortness of breath  Cardiovascular: no chest pain, no palpitations, no edema  Gastrointestinal: no nausea, vomiting, or diarrhea. No abdominal pain  Genitourinary: no dysuria, no urinary frequency or urgency, no hematuria  Integumentary: no skin rash or abnormal lesion  Neurologic: no headache, no dizziness, no weakness or numbness      Resp. rate 17, height 6' 2" (1.88 m), weight 84.8 kg (186 lb 15.2 oz).   PE:  Physical Exam  Constitutional:       General: He is not in " acute distress.     Appearance: He is not diaphoretic.   Cardiovascular:      Rate and Rhythm: Normal rate and regular rhythm.      Heart sounds: No murmur heard.    No gallop.   Pulmonary:      Effort: Pulmonary effort is normal.      Breath sounds: Normal breath sounds.   Abdominal:      General: Bowel sounds are normal.      Palpations: Abdomen is soft.      Tenderness: There is no abdominal tenderness.   Musculoskeletal:         General: Normal range of motion.   Skin:     General: Skin is warm and dry.   Neurological:      General: No focal deficit present.      Mental Status: He is alert.   Psychiatric:         Mood and Affect: Mood normal.              ASSESSMENT/PLAN:    CHF/CAD/HTN/HLD  -congestive heart failure with EF of 20%  -Continue entresto  -metoprolol succinate 25 mg   -Lasix 40 mg  -Continue Aspirin  - Start Jadiance 10mg once a day  -LDL >70. Continue atorvastatin  -Blood pressure in office was 144/90. Patient advised to make a nurse visit to recheck blood pressure. Patient admitted to smoking a cigarette before walking in    DM  -Continue metformin    Cigarette use  -1/2 ppd for 31 years  -referral to smoking cessation       Follow up in 4 months    Oralia Fox M.D  [facility] PGY-1

## 2023-07-11 NOTE — PROGRESS NOTES
Cardiology Attending    I evaluated Isaías Prasad and discussed the patient's symptoms, findings, and management plan with the resident.  Please see the Cardiology note for details.

## 2023-08-22 ENCOUNTER — CLINICAL SUPPORT (OUTPATIENT)
Dept: CARDIOLOGY | Facility: CLINIC | Age: 58
End: 2023-08-22
Payer: MEDICAID

## 2023-08-22 VITALS
DIASTOLIC BLOOD PRESSURE: 71 MMHG | OXYGEN SATURATION: 99 % | WEIGHT: 185.19 LBS | BODY MASS INDEX: 23.77 KG/M2 | HEIGHT: 74 IN | HEART RATE: 90 BPM | SYSTOLIC BLOOD PRESSURE: 116 MMHG | RESPIRATION RATE: 20 BRPM

## 2023-08-22 DIAGNOSIS — I50.9 ACUTE CONGESTIVE HEART FAILURE, UNSPECIFIED HEART FAILURE TYPE: Primary | ICD-10-CM

## 2023-08-22 PROCEDURE — 99214 OFFICE O/P EST MOD 30 MIN: CPT | Mod: PBBFAC

## 2023-08-22 NOTE — PROGRESS NOTES
Here for B/P check. He did take AM medications, however he does state that he takes most medications in the evening.  Today's B/P is 116/71, HR 90. Instructions are to continue current medications, importance of compliance discussed.  Encouraged to keep all future appointments. He verbalizes understanding.

## 2023-08-23 DIAGNOSIS — F17.200 NEEDS SMOKING CESSATION EDUCATION: ICD-10-CM

## 2024-02-16 ENCOUNTER — OFFICE VISIT (OUTPATIENT)
Dept: FAMILY MEDICINE | Facility: CLINIC | Age: 59
End: 2024-02-16
Payer: MEDICAID

## 2024-02-16 VITALS
DIASTOLIC BLOOD PRESSURE: 93 MMHG | OXYGEN SATURATION: 100 % | BODY MASS INDEX: 26.51 KG/M2 | SYSTOLIC BLOOD PRESSURE: 166 MMHG | TEMPERATURE: 98 F | WEIGHT: 200 LBS | HEIGHT: 73 IN | HEART RATE: 96 BPM

## 2024-02-16 DIAGNOSIS — N18.31 STAGE 3A CHRONIC KIDNEY DISEASE: ICD-10-CM

## 2024-02-16 DIAGNOSIS — R41.3 MEMORY LOSS: ICD-10-CM

## 2024-02-16 DIAGNOSIS — F17.200 NEEDS SMOKING CESSATION EDUCATION: ICD-10-CM

## 2024-02-16 DIAGNOSIS — R73.02 IMPAIRED GLUCOSE TOLERANCE: ICD-10-CM

## 2024-02-16 DIAGNOSIS — E78.49 OTHER HYPERLIPIDEMIA: Primary | ICD-10-CM

## 2024-02-16 DIAGNOSIS — G45.9 TRANSIENT ISCHEMIC ATTACK: ICD-10-CM

## 2024-02-16 DIAGNOSIS — Z12.5 PROSTATE CANCER SCREENING: ICD-10-CM

## 2024-02-16 DIAGNOSIS — R41.3 OTHER AMNESIA: ICD-10-CM

## 2024-02-16 DIAGNOSIS — I50.9 ACUTE CONGESTIVE HEART FAILURE, UNSPECIFIED HEART FAILURE TYPE: ICD-10-CM

## 2024-02-16 DIAGNOSIS — R73.02 IGT (IMPAIRED GLUCOSE TOLERANCE): ICD-10-CM

## 2024-02-16 DIAGNOSIS — Z72.0 TOBACCO USER: ICD-10-CM

## 2024-02-16 DIAGNOSIS — F17.200 SMOKER: ICD-10-CM

## 2024-02-16 LAB
ALBUMIN SERPL-MCNC: 3.9 G/DL (ref 3.5–5)
ALBUMIN/GLOB SERPL: 1.3 RATIO (ref 1.1–2)
ALP SERPL-CCNC: 92 UNIT/L (ref 40–150)
ALT SERPL-CCNC: 26 UNIT/L (ref 0–55)
AST SERPL-CCNC: 27 UNIT/L (ref 5–34)
BASOPHILS # BLD AUTO: 0.07 X10(3)/MCL
BASOPHILS NFR BLD AUTO: 0.8 %
BILIRUB SERPL-MCNC: 0.4 MG/DL
BUN SERPL-MCNC: 27.2 MG/DL (ref 8.4–25.7)
CALCIUM SERPL-MCNC: 8.9 MG/DL (ref 8.4–10.2)
CHLORIDE SERPL-SCNC: 106 MMOL/L (ref 98–107)
CHOLEST SERPL-MCNC: 190 MG/DL
CHOLEST/HDLC SERPL: 3 {RATIO} (ref 0–5)
CO2 SERPL-SCNC: 24 MMOL/L (ref 22–29)
CREAT SERPL-MCNC: 1.27 MG/DL (ref 0.73–1.18)
CREAT UR-MCNC: 105.3 MG/DL (ref 63–166)
DEPRECATED CALCIDIOL+CALCIFEROL SERPL-MC: 34.4 NG/ML (ref 30–80)
EOSINOPHIL # BLD AUTO: 0.19 X10(3)/MCL (ref 0–0.9)
EOSINOPHIL NFR BLD AUTO: 2.3 %
ERYTHROCYTE [DISTWIDTH] IN BLOOD BY AUTOMATED COUNT: 13.1 % (ref 11.5–17)
EST. AVERAGE GLUCOSE BLD GHB EST-MCNC: 111.2 MG/DL
FOLATE SERPL-MCNC: 4.7 NG/ML (ref 7–31.4)
GFR SERPLBLD CREATININE-BSD FMLA CKD-EPI: >60 MLS/MIN/1.73/M2
GLOBULIN SER-MCNC: 3 GM/DL (ref 2.4–3.5)
GLUCOSE SERPL-MCNC: 127 MG/DL (ref 74–100)
HBA1C MFR BLD: 5.5 %
HCT VFR BLD AUTO: 42.9 % (ref 42–52)
HDLC SERPL-MCNC: 55 MG/DL (ref 35–60)
HGB BLD-MCNC: 14.9 G/DL (ref 14–18)
HIV 1+2 AB+HIV1 P24 AG SERPL QL IA: NONREACTIVE
IMM GRANULOCYTES # BLD AUTO: 0.05 X10(3)/MCL (ref 0–0.04)
IMM GRANULOCYTES NFR BLD AUTO: 0.6 %
LDLC SERPL CALC-MCNC: 106 MG/DL (ref 50–140)
LYMPHOCYTES # BLD AUTO: 2.05 X10(3)/MCL (ref 0.6–4.6)
LYMPHOCYTES NFR BLD AUTO: 24.9 %
MCH RBC QN AUTO: 33.3 PG (ref 27–31)
MCHC RBC AUTO-ENTMCNC: 34.7 G/DL (ref 33–36)
MCV RBC AUTO: 96 FL (ref 80–94)
MICROALBUMIN UR-MCNC: 7.1 UG/ML
MICROALBUMIN/CREAT RATIO PNL UR: 6.7 MG/GM CR (ref 0–30)
MONOCYTES # BLD AUTO: 0.67 X10(3)/MCL (ref 0.1–1.3)
MONOCYTES NFR BLD AUTO: 8.1 %
NEUTROPHILS # BLD AUTO: 5.21 X10(3)/MCL (ref 2.1–9.2)
NEUTROPHILS NFR BLD AUTO: 63.3 %
NRBC BLD AUTO-RTO: 0 %
PLATELET # BLD AUTO: 261 X10(3)/MCL (ref 130–400)
PMV BLD AUTO: 9.5 FL (ref 7.4–10.4)
POTASSIUM SERPL-SCNC: 4.5 MMOL/L (ref 3.5–5.1)
PROT SERPL-MCNC: 6.9 GM/DL (ref 6.4–8.3)
PSA SERPL-MCNC: 1.95 NG/ML
RBC # BLD AUTO: 4.47 X10(6)/MCL (ref 4.7–6.1)
SODIUM SERPL-SCNC: 139 MMOL/L (ref 136–145)
T PALLIDUM AB SER QL: NONREACTIVE
T4 FREE SERPL-MCNC: 0.96 NG/DL (ref 0.7–1.48)
TRIGL SERPL-MCNC: 147 MG/DL (ref 34–140)
TSH SERPL-ACNC: 0.28 UIU/ML (ref 0.35–4.94)
VIT B12 SERPL-MCNC: 794 PG/ML (ref 213–816)
VLDLC SERPL CALC-MCNC: 29 MG/DL
WBC # SPEC AUTO: 8.24 X10(3)/MCL (ref 4.5–11.5)

## 2024-02-16 PROCEDURE — 3008F BODY MASS INDEX DOCD: CPT | Mod: CPTII,,, | Performed by: STUDENT IN AN ORGANIZED HEALTH CARE EDUCATION/TRAINING PROGRAM

## 2024-02-16 PROCEDURE — 3079F DIAST BP 80-89 MM HG: CPT | Mod: CPTII,,, | Performed by: STUDENT IN AN ORGANIZED HEALTH CARE EDUCATION/TRAINING PROGRAM

## 2024-02-16 PROCEDURE — 85025 COMPLETE CBC W/AUTO DIFF WBC: CPT | Performed by: STUDENT IN AN ORGANIZED HEALTH CARE EDUCATION/TRAINING PROGRAM

## 2024-02-16 PROCEDURE — 84439 ASSAY OF FREE THYROXINE: CPT | Performed by: STUDENT IN AN ORGANIZED HEALTH CARE EDUCATION/TRAINING PROGRAM

## 2024-02-16 PROCEDURE — 87389 HIV-1 AG W/HIV-1&-2 AB AG IA: CPT | Performed by: STUDENT IN AN ORGANIZED HEALTH CARE EDUCATION/TRAINING PROGRAM

## 2024-02-16 PROCEDURE — 82043 UR ALBUMIN QUANTITATIVE: CPT | Performed by: STUDENT IN AN ORGANIZED HEALTH CARE EDUCATION/TRAINING PROGRAM

## 2024-02-16 PROCEDURE — 86780 TREPONEMA PALLIDUM: CPT | Performed by: STUDENT IN AN ORGANIZED HEALTH CARE EDUCATION/TRAINING PROGRAM

## 2024-02-16 PROCEDURE — 84153 ASSAY OF PSA TOTAL: CPT | Performed by: STUDENT IN AN ORGANIZED HEALTH CARE EDUCATION/TRAINING PROGRAM

## 2024-02-16 PROCEDURE — 83036 HEMOGLOBIN GLYCOSYLATED A1C: CPT | Performed by: STUDENT IN AN ORGANIZED HEALTH CARE EDUCATION/TRAINING PROGRAM

## 2024-02-16 PROCEDURE — 80061 LIPID PANEL: CPT | Performed by: STUDENT IN AN ORGANIZED HEALTH CARE EDUCATION/TRAINING PROGRAM

## 2024-02-16 PROCEDURE — 82607 VITAMIN B-12: CPT | Performed by: STUDENT IN AN ORGANIZED HEALTH CARE EDUCATION/TRAINING PROGRAM

## 2024-02-16 PROCEDURE — 99215 OFFICE O/P EST HI 40 MIN: CPT | Mod: PBBFAC,PN | Performed by: STUDENT IN AN ORGANIZED HEALTH CARE EDUCATION/TRAINING PROGRAM

## 2024-02-16 PROCEDURE — 99214 OFFICE O/P EST MOD 30 MIN: CPT | Mod: S$PBB,,, | Performed by: STUDENT IN AN ORGANIZED HEALTH CARE EDUCATION/TRAINING PROGRAM

## 2024-02-16 PROCEDURE — 82306 VITAMIN D 25 HYDROXY: CPT | Performed by: STUDENT IN AN ORGANIZED HEALTH CARE EDUCATION/TRAINING PROGRAM

## 2024-02-16 PROCEDURE — 3077F SYST BP >= 140 MM HG: CPT | Mod: CPTII,,, | Performed by: STUDENT IN AN ORGANIZED HEALTH CARE EDUCATION/TRAINING PROGRAM

## 2024-02-16 PROCEDURE — 80053 COMPREHEN METABOLIC PANEL: CPT | Performed by: STUDENT IN AN ORGANIZED HEALTH CARE EDUCATION/TRAINING PROGRAM

## 2024-02-16 PROCEDURE — 36415 COLL VENOUS BLD VENIPUNCTURE: CPT | Performed by: STUDENT IN AN ORGANIZED HEALTH CARE EDUCATION/TRAINING PROGRAM

## 2024-02-16 PROCEDURE — 82746 ASSAY OF FOLIC ACID SERUM: CPT | Performed by: STUDENT IN AN ORGANIZED HEALTH CARE EDUCATION/TRAINING PROGRAM

## 2024-02-16 PROCEDURE — 84443 ASSAY THYROID STIM HORMONE: CPT | Performed by: STUDENT IN AN ORGANIZED HEALTH CARE EDUCATION/TRAINING PROGRAM

## 2024-02-16 PROCEDURE — 1159F MED LIST DOCD IN RCRD: CPT | Mod: CPTII,,, | Performed by: STUDENT IN AN ORGANIZED HEALTH CARE EDUCATION/TRAINING PROGRAM

## 2024-02-16 RX ORDER — FUROSEMIDE 20 MG/1
20 TABLET ORAL 2 TIMES DAILY
Qty: 180 TABLET | Refills: 3 | Status: SHIPPED | OUTPATIENT
Start: 2024-02-16 | End: 2024-09-13

## 2024-02-16 NOTE — ASSESSMENT & PLAN NOTE
Patient is not wearing LifeVest has been urged to do so    Continuing Entresto  24/261 tablet b.i.d.   metoprolol succinate 25 mg XL   Lasix 20 mg b.i.d. patient's Lasix as he has been out of medication and blood pressure is elevated  Restarting medication for patient.   CMP and Mag today

## 2024-02-16 NOTE — ASSESSMENT & PLAN NOTE
Patient with history of transient ischemic attacks and now presenting with issues with memory will order MRI brain check B12/RPR refer patient to Family Medicine for geriatric eval

## 2024-02-16 NOTE — PROGRESS NOTES
Patient Name: Isaías Prasad     : 1965    MRN: [id number]     Subjective:     Patient ID: Isaías Prasad is a 58 y.o. male.    Chief Complaint:   Chief Complaint   Patient presents with    Follow-up     Patient complains of forgetting more for the past few months. Bp done twice. 168/88 then 166/93        HPI: HPI  58 year old male presents for follow up of CHF and impaired glucose tolerance Patient has a complex past medical history of hypertension, hyperlipidemia, TIA in 2019  Tobacco and polysubstance use , colostomy with reversal,        Patient is new issue today is he finds that for the past several months he has been forgetting more things   States he got lost once and drove to [place] without understanding why  Reports that he also go to [facility] having forgotten that he has been there previously  Denies early family history of Alzheimer's     EF 20%/hypertension  Presented to   ED 2022 with new onset shortness of breath  Was found to be in congestive heart failure with EF of 20%  Patient started on Entresto     metoprolol succinate 25 mg   Lasix 40 mg daily by Cardiology  Did see cardiology in July  Still not wearing LifeVest states that he is feeling well   Blood pressure in clinic elevated at 160  but patient states that he has been out of his Lasix    Cigarette use  Referred to smoking cessation  1/2 ppd for 31 years       Hyperlipidemia   Lipitor 40  Aspirin 81 mg     Impaired glucose tolerance  A1c 6.1   Metformin 500 mg extended release  Jardiance 10 mg daily (started by cardiology)    CKD III noted on labs       Med hx as above  Social hx- Smoker. Works as a    ROS:      ROS     12 point review of systems conducted, negative except as stated in the history of present illness. See HPI for details.    History:     Past Medical History:   Diagnosis Date    Disruption of external operation (surgical) wound, not elsewhere classified, initial encounter 2022     Open abdominal wall wound 5/30/2022    Status post colostomy 5/30/2022    Tobacco user 5/26/2022    Transient ischemic attack 5/26/2022        Past Surgical History:   Procedure Laterality Date    COLOSTOMY      EXAMINATION UNDER ANESTHESIA N/A 5/18/2022    Procedure: Exam under anesthesia, removal of rectal foreign body;  Surgeon: Chauncey Jimenez Jr., MD;  Location: [facility];  Service: General;  Laterality: N/A;  David/Scheuermann, possible ex lap       History reviewed. No pertinent family history.     Social History     Tobacco Use    Smoking status: Some Days     Current packs/day: 0.50     Average packs/day: 0.5 packs/day for 9.0 years (4.5 ttl pk-yrs)     Types: Cigarettes     Passive exposure: Never    Smokeless tobacco: Never    Tobacco comments:     Smoked for 6 years then quit and started back smoking now for 3     years   Substance and Sexual Activity    Alcohol use: Not Currently     Alcohol/week: 7.0 standard drinks of alcohol     Types: 6 Cans of beer, 1 Drinks containing 0.5 oz of alcohol per week     Comment: twice a week    Drug use: Not Currently     Types: Marijuana, Cocaine     Comment: last cocaine use x1 yr ago, smokes marijana weekly    Sexual activity: Yes     Partners: Female       Current Outpatient Medications   Medication Instructions    aspirin 81 mg, Oral, Daily    atorvastatin (LIPITOR) 40 mg, Oral, Daily    empagliflozin (JARDIANCE) 25 mg, Oral, Daily    furosemide (LASIX) 20 mg, Oral, 2 times daily    metoprolol succinate (TOPROL-XL) 25 mg, Oral, Daily    nicotine (NICODERM CQ) 14 mg/24 hr 1 patch, Transdermal, Daily    nicotine (NICODERM CQ) 21 mg/24 hr 1 patch, Transdermal, Daily    nicotine (NICODERM CQ) 7 mg/24 hr 1 patch, Transdermal, Daily    sacubitriL-valsartan (ENTRESTO) 24-26 mg per tablet 1 tablet, Oral, 2 times daily        Review of patient's allergies indicates:  No Known Allergies    Objective:     Visit Vitals  BP (!) 166/93 (BP Location: Left arm, Patient  "Position: Sitting)   Pulse 96   Temp 97.8 °F (36.6 °C)   Ht 6' 1" (1.854 m)   Wt 90.7 kg (200 lb)   SpO2 100%   BMI 26.39 kg/m²       Physical Examination:     Physical Exam  Constitutional:       General: He is not in acute distress.     Appearance: Normal appearance. He is not ill-appearing or diaphoretic.   HENT:      Nose: No congestion.   Eyes:      General: No scleral icterus.     Conjunctiva/sclera: Conjunctivae normal.      Pupils: Pupils are equal, round, and reactive to light.   Cardiovascular:      Rate and Rhythm: Normal rate and regular rhythm.      Heart sounds: No murmur heard.  Pulmonary:      Effort: Pulmonary effort is normal. No respiratory distress.      Breath sounds: Normal breath sounds. No wheezing.   Musculoskeletal:         General: No swelling, tenderness, deformity or signs of injury. Normal range of motion.      Cervical back: Normal range of motion.   Skin:     General: Skin is warm and dry.      Coloration: Skin is not jaundiced.   Neurological:      General: No focal deficit present.      Mental Status: He is alert and oriented to person, place, and time. Mental status is at baseline.      Gait: Gait normal.   Psychiatric:         Mood and Affect: Mood normal.         Behavior: Behavior normal.         Thought Content: Thought content normal.         Judgment: Judgment normal.         Lab Results:     Chemistry:  Lab Results   Component Value Date     05/30/2023    K 4.7 05/30/2023    CHLORIDE 103 05/30/2023    BUN 28.7 (H) 05/30/2023    CREATININE 1.50 (H) 05/30/2023    EGFRNORACEVR 54 05/30/2023    GLUCOSE 119 (H) 05/30/2023    CALCIUM 9.8 05/30/2023    ALKPHOS 104 05/30/2023    LABPROT 7.3 05/30/2023    ALBUMIN 4.3 05/30/2023    BILIDIR 0.1 10/26/2019    IBILI 0.4 10/26/2019    AST 23 05/30/2023    ALT 27 05/30/2023    MG 2.00 05/30/2023    PHOS 2.6 10/19/2022    TSH 0.118 (L) 02/22/2023    TUYJJE3LNYK 0.80 02/22/2023    PSA 1.38 02/22/2023        Lab Results   Component " Value Date    HGBA1C 5.2 05/30/2023        Hematology:  Lab Results   Component Value Date    WBC 9.3 02/22/2023    HGB 14.5 02/22/2023    HCT 41.6 (L) 02/22/2023     02/22/2023       Lipid Panel:  Lab Results   Component Value Date    CHOL 157 02/22/2023    HDL 51 02/22/2023    LDL 77.00 02/22/2023    TRIG 145 (H) 02/22/2023    TOTALCHOLEST 3 02/22/2023        Urine:  Lab Results   Component Value Date    COLORUA Light-Yellow 12/14/2022    APPEARANCEUA Clear 12/14/2022    SGUA 1.016 12/14/2022    PHUA 7.0 12/14/2022    PROTEINUA Negative 12/14/2022    GLUCOSEUA Normal 12/14/2022    KETONESUA Negative 12/14/2022    BLOODUA Negative 12/14/2022    NITRITESUA Negative 12/14/2022    LEUKOCYTESUR Negative 12/14/2022    RBCUA 0-5 12/14/2022    WBCUA 0-5 12/14/2022    BACTERIA None Seen 12/14/2022    SQEPUA Trace (A) 12/14/2022    HYALINECASTS None Seen 12/14/2022        Assessment:          ICD-10-CM ICD-9-CM   1. Other hyperlipidemia  E78.49 272.4   2. Stage 3a chronic kidney disease  N18.31 585.3   3. Tobacco user  Z72.0 305.1   4. Memory loss  R41.3 780.93   5. IGT (impaired glucose tolerance)  R73.02 790.22   6. Prostate cancer screening  Z12.5 V76.44   7. Acute congestive heart failure, unspecified heart failure type  I50.9 428.0   8. Needs smoking cessation education  F17.200 V62.3   9. Smoker  F17.200 305.1   10. Other amnesia  R41.3 780.93   11. Transient ischemic attack  G45.9 435.9   12. Impaired glucose tolerance  R73.02 790.22        Plan:     1. Other hyperlipidemia  Overview:  ASA 81 mg  Atorvastatin 40 mg daily     Orders:  -     Lipid Panel; Future; Expected date: 02/16/2024    2. Stage 3a chronic kidney disease  Overview:  Continuing to monitor.     Orders:  -     Comprehensive Metabolic Panel; Future; Expected date: 02/16/2024  -     Microalbumin/Creatinine Ratio, Urine; Future; Expected date: 02/16/2024    3. Tobacco user  Overview:  Referring again to smoking cessation      4. Memory loss  -      CBC Auto Differential; Future; Expected date: 02/16/2024  -     TSH; Future; Expected date: 02/16/2024  -     T4, Free; Future; Expected date: 02/16/2024  -     Vitamin D; Future; Expected date: 02/16/2024  -     Vitamin B12; Future; Expected date: 02/16/2024  -     Folate; Future; Expected date: 02/16/2024  -     SYPHILIS ANTIBODY (WITH REFLEX RPR); Future; Expected date: 02/16/2024  -     HIV 1/2 Ag/Ab (4th Gen)  -     MRI Brain Without Contrast; Future; Expected date: 02/16/2024  -     Ambulatory referral/consult to Family Practice; Future; Expected date: 02/23/2024    5. IGT (impaired glucose tolerance)  -     Hemoglobin A1C; Future; Expected date: 02/16/2024  -     Microalbumin/Creatinine Ratio, Urine; Future; Expected date: 02/16/2024  -     empagliflozin (JARDIANCE) 25 mg tablet; Take 1 tablet (25 mg total) by mouth once daily.  Dispense: 90 tablet; Refill: 3    6. Prostate cancer screening  -     PSA, Screening; Future; Expected date: 02/16/2024    7. Acute congestive heart failure, unspecified heart failure type  Assessment & Plan:    Patient is not wearing LifeVest has been urged to do so    Continuing Entresto  24/261 tablet b.i.d.   metoprolol succinate 25 mg XL   Lasix 20 mg b.i.d. patient's Lasix as he has been out of medication and blood pressure is elevated  Restarting medication for patient.   CMP and Mag today    Orders:  -     furosemide (LASIX) 20 MG tablet; Take 1 tablet (20 mg total) by mouth 2 (two) times daily.  Dispense: 180 tablet; Refill: 3  -     Ambulatory referral/consult to Smoking Cessation Program    8. Needs smoking cessation education  -     Ambulatory referral/consult to Smoking Cessation Program    9. Smoker  -     Ambulatory referral/consult to Smoking Cessation Program  -     Ambulatory referral/consult to Smoking Cessation Program    10. Other amnesia  -     MRI Brain Without Contrast; Future; Expected date: 02/16/2024    11. Transient ischemic attack  Assessment &  Plan:  Patient with history of transient ischemic attacks and now presenting with issues with memory will order MRI brain check B12/RPR refer patient to Family Medicine for geriatric eval      12. Impaired glucose tolerance  Assessment & Plan:  A1c today increasing Jardiance to 25 mg daily stopping metformin metformin           Follow up in about 3 months (around 5/16/2024) for Hypertension, memory loss.    Future Appointments   Date Time Provider Department Center   4/16/2024  9:20 AM Ann Marie Lin MD [facility]        Ann Marie Lin MD

## 2024-03-07 ENCOUNTER — HOSPITAL ENCOUNTER (OUTPATIENT)
Dept: RADIOLOGY | Facility: HOSPITAL | Age: 59
Discharge: HOME | End: 2024-03-07
Attending: STUDENT IN AN ORGANIZED HEALTH CARE EDUCATION/TRAINING PROGRAM

## 2024-03-07 DIAGNOSIS — R41.3 OTHER AMNESIA: ICD-10-CM

## 2024-03-07 DIAGNOSIS — R41.3 MEMORY LOSS: ICD-10-CM

## 2024-03-07 PROCEDURE — 70551 MRI BRAIN STEM W/O DYE: CPT | Mod: TC

## 2024-03-13 ENCOUNTER — OFFICE VISIT (OUTPATIENT)
Dept: FAMILY MEDICINE | Facility: CLINIC | Age: 59
End: 2024-03-13

## 2024-03-13 VITALS
HEIGHT: 73 IN | DIASTOLIC BLOOD PRESSURE: 79 MMHG | BODY MASS INDEX: 25.45 KG/M2 | HEART RATE: 85 BPM | WEIGHT: 192 LBS | OXYGEN SATURATION: 98 % | RESPIRATION RATE: 20 BRPM | SYSTOLIC BLOOD PRESSURE: 136 MMHG | TEMPERATURE: 99 F

## 2024-03-13 DIAGNOSIS — E53.8 FOLATE DEFICIENCY: Primary | ICD-10-CM

## 2024-03-13 DIAGNOSIS — G31.84 MILD COGNITIVE IMPAIRMENT: ICD-10-CM

## 2024-03-13 DIAGNOSIS — R45.89 DEPRESSED MOOD: ICD-10-CM

## 2024-03-13 DIAGNOSIS — R41.3 MEMORY LOSS: ICD-10-CM

## 2024-03-13 DIAGNOSIS — F19.10 SUBSTANCE ABUSE: ICD-10-CM

## 2024-03-13 DIAGNOSIS — M54.9 BACK PAIN, UNSPECIFIED BACK LOCATION, UNSPECIFIED BACK PAIN LATERALITY, UNSPECIFIED CHRONICITY: ICD-10-CM

## 2024-03-13 DIAGNOSIS — Z72.0 TOBACCO USE: ICD-10-CM

## 2024-03-13 PROCEDURE — 99215 OFFICE O/P EST HI 40 MIN: CPT | Mod: PBBFAC | Performed by: FAMILY MEDICINE

## 2024-03-13 RX ORDER — BUPROPION HYDROCHLORIDE 150 MG/1
150 TABLET ORAL DAILY
Qty: 30 TABLET | Refills: 11 | Status: SHIPPED | OUTPATIENT
Start: 2024-03-13 | End: 2025-03-13

## 2024-03-13 RX ORDER — FOLIC ACID 1 MG/1
1 TABLET ORAL DAILY
Qty: 90 TABLET | Refills: 3 | Status: SHIPPED | OUTPATIENT
Start: 2024-03-13 | End: 2025-03-13

## 2024-03-13 RX ORDER — FOLIC ACID 1 MG/1
1 TABLET ORAL DAILY
Qty: 90 TABLET | Refills: 3 | Status: SHIPPED | OUTPATIENT
Start: 2024-03-13 | End: 2024-03-13

## 2024-03-13 RX ORDER — BUPROPION HYDROCHLORIDE 150 MG/1
150 TABLET ORAL DAILY
Qty: 30 TABLET | Refills: 11 | Status: SHIPPED | OUTPATIENT
Start: 2024-03-13 | End: 2024-03-13
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
## 2024-03-13 NOTE — PATIENT INSTRUCTIONS
Please try your best to cut down on your smoking and drinking.  We have referred you for the smoking cessation program to help you with this.  This directly affects your brain health.    Also we are starting a new medication for you, which the nurse will also work with you - Wellbutrin, which you take once daily in the morning with food, to help you quit.  It will also help with your mood.     Also please buy over the counter - in the vitamin section - FOLIC ACID 1mg, take once a day.     Talk to your friends and family about who you can have as your support system, emergency help, confidant.

## 2024-04-29 DIAGNOSIS — R73.02 IMPAIRED GLUCOSE TOLERANCE: ICD-10-CM

## 2024-04-29 NOTE — TELEPHONE ENCOUNTER
No care due was identified.  [facility] Embedded Care Due Messages. Reference number: [id number].   4/29/2024 2:29:21 PM CDT

## 2024-04-30 NOTE — TELEPHONE ENCOUNTER
Refill Routing Note   Medication(s) are not appropriate for processing by [facility] for the following reason(s):        No active prescription written by provider    [facility] action(s):  Defer        Medication Therapy Plan: prescription was discontinued on 5/30/2023 by Delia,      Appointments  past 12m or future 3m with PCP    Date Provider   Last Visit   2/16/2024 Ann Marie Lin MD   Next Visit   5/21/2024 Ann Marie Lin MD   ED visits in past 90 days: 0        Note composed:2:21 PM 04/30/2024

## 2024-05-03 RX ORDER — METFORMIN HYDROCHLORIDE 500 MG/1
500 TABLET, EXTENDED RELEASE ORAL
Qty: 90 TABLET | Refills: 3 | Status: SHIPPED | OUTPATIENT
Start: 2024-05-03

## 2024-05-20 NOTE — PROGRESS NOTES
Date of Service: 3/13/2024    Attending Attestation: Patient discussed with resident. The chart was reviewed thoroughly including pertinent vitals, labs, imaging, medications, prior notes, and consultant/specialist recommendations.  I participated in the management of the patient, examined the patient, reviewed the summary of the plan, and was immediately available at all times throughout the encounter. Services were furnished in a primary care center located in the outpatient department of a [facility] hospital. I agree with the resident's findings and plan as documented in the resident's note.    Gali Booth MD  Attending - Family Medicine / Geriatric Medicine  [facility] [place], [facility]

## 2024-05-21 ENCOUNTER — OFFICE VISIT (OUTPATIENT)
Dept: FAMILY MEDICINE | Facility: CLINIC | Age: 59
End: 2024-05-21
Payer: COMMERCIAL

## 2024-05-21 VITALS
DIASTOLIC BLOOD PRESSURE: 80 MMHG | SYSTOLIC BLOOD PRESSURE: 150 MMHG | HEART RATE: 80 BPM | BODY MASS INDEX: 26.41 KG/M2 | HEIGHT: 72 IN | OXYGEN SATURATION: 100 % | WEIGHT: 195 LBS

## 2024-05-21 DIAGNOSIS — N18.31 STAGE 3A CHRONIC KIDNEY DISEASE: ICD-10-CM

## 2024-05-21 DIAGNOSIS — E78.5 HYPERLIPIDEMIA, UNSPECIFIED HYPERLIPIDEMIA TYPE: ICD-10-CM

## 2024-05-21 DIAGNOSIS — Z72.0 TOBACCO USER: ICD-10-CM

## 2024-05-21 DIAGNOSIS — Z12.12 ENCOUNTER FOR COLORECTAL CANCER SCREENING: Primary | ICD-10-CM

## 2024-05-21 DIAGNOSIS — I50.9 ACUTE CONGESTIVE HEART FAILURE, UNSPECIFIED HEART FAILURE TYPE: ICD-10-CM

## 2024-05-21 DIAGNOSIS — R73.02 IMPAIRED GLUCOSE TOLERANCE: ICD-10-CM

## 2024-05-21 DIAGNOSIS — Z12.11 ENCOUNTER FOR COLORECTAL CANCER SCREENING: Primary | ICD-10-CM

## 2024-05-21 DIAGNOSIS — F17.210 CIGARETTE SMOKER: ICD-10-CM

## 2024-05-21 DIAGNOSIS — F10.27: ICD-10-CM

## 2024-05-21 DIAGNOSIS — I10 HYPERTENSION, UNSPECIFIED TYPE: ICD-10-CM

## 2024-05-21 LAB
ANION GAP SERPL CALC-SCNC: 8 MEQ/L
BUN SERPL-MCNC: 31.2 MG/DL (ref 8.4–25.7)
CALCIUM SERPL-MCNC: 9.4 MG/DL (ref 8.4–10.2)
CHLORIDE SERPL-SCNC: 107 MMOL/L (ref 98–107)
CO2 SERPL-SCNC: 24 MMOL/L (ref 22–29)
CREAT SERPL-MCNC: 1.34 MG/DL (ref 0.73–1.18)
CREAT/UREA NIT SERPL: 23
EST. AVERAGE GLUCOSE BLD GHB EST-MCNC: 116.9 MG/DL
GFR SERPLBLD CREATININE-BSD FMLA CKD-EPI: >60 ML/MIN/1.73/M2
GLUCOSE SERPL-MCNC: 87 MG/DL (ref 74–100)
HBA1C MFR BLD: 5.7 %
POTASSIUM SERPL-SCNC: 4.4 MMOL/L (ref 3.5–5.1)
SODIUM SERPL-SCNC: 139 MMOL/L (ref 136–145)
T4 FREE SERPL-MCNC: 0.94 NG/DL (ref 0.7–1.48)
TSH SERPL-ACNC: 0.18 UIU/ML (ref 0.35–4.94)

## 2024-05-21 PROCEDURE — 99406 BEHAV CHNG SMOKING 3-10 MIN: CPT | Mod: S$PBB,,, | Performed by: STUDENT IN AN ORGANIZED HEALTH CARE EDUCATION/TRAINING PROGRAM

## 2024-05-21 PROCEDURE — 80048 BASIC METABOLIC PNL TOTAL CA: CPT | Performed by: STUDENT IN AN ORGANIZED HEALTH CARE EDUCATION/TRAINING PROGRAM

## 2024-05-21 PROCEDURE — 3077F SYST BP >= 140 MM HG: CPT | Mod: CPTII,,, | Performed by: STUDENT IN AN ORGANIZED HEALTH CARE EDUCATION/TRAINING PROGRAM

## 2024-05-21 PROCEDURE — 3008F BODY MASS INDEX DOCD: CPT | Mod: CPTII,,, | Performed by: STUDENT IN AN ORGANIZED HEALTH CARE EDUCATION/TRAINING PROGRAM

## 2024-05-21 PROCEDURE — 83036 HEMOGLOBIN GLYCOSYLATED A1C: CPT | Performed by: STUDENT IN AN ORGANIZED HEALTH CARE EDUCATION/TRAINING PROGRAM

## 2024-05-21 PROCEDURE — 3061F NEG MICROALBUMINURIA REV: CPT | Mod: CPTII,,, | Performed by: STUDENT IN AN ORGANIZED HEALTH CARE EDUCATION/TRAINING PROGRAM

## 2024-05-21 PROCEDURE — 3079F DIAST BP 80-89 MM HG: CPT | Mod: CPTII,,, | Performed by: STUDENT IN AN ORGANIZED HEALTH CARE EDUCATION/TRAINING PROGRAM

## 2024-05-21 PROCEDURE — 3066F NEPHROPATHY DOC TX: CPT | Mod: CPTII,,, | Performed by: STUDENT IN AN ORGANIZED HEALTH CARE EDUCATION/TRAINING PROGRAM

## 2024-05-21 PROCEDURE — 1159F MED LIST DOCD IN RCRD: CPT | Mod: CPTII,,, | Performed by: STUDENT IN AN ORGANIZED HEALTH CARE EDUCATION/TRAINING PROGRAM

## 2024-05-21 PROCEDURE — 84439 ASSAY OF FREE THYROXINE: CPT | Performed by: STUDENT IN AN ORGANIZED HEALTH CARE EDUCATION/TRAINING PROGRAM

## 2024-05-21 PROCEDURE — 36415 COLL VENOUS BLD VENIPUNCTURE: CPT | Performed by: STUDENT IN AN ORGANIZED HEALTH CARE EDUCATION/TRAINING PROGRAM

## 2024-05-21 PROCEDURE — 99214 OFFICE O/P EST MOD 30 MIN: CPT | Mod: PBBFAC,PN | Performed by: STUDENT IN AN ORGANIZED HEALTH CARE EDUCATION/TRAINING PROGRAM

## 2024-05-21 PROCEDURE — 99214 OFFICE O/P EST MOD 30 MIN: CPT | Mod: S$PBB,25,, | Performed by: STUDENT IN AN ORGANIZED HEALTH CARE EDUCATION/TRAINING PROGRAM

## 2024-05-21 PROCEDURE — 84443 ASSAY THYROID STIM HORMONE: CPT | Performed by: STUDENT IN AN ORGANIZED HEALTH CARE EDUCATION/TRAINING PROGRAM

## 2024-05-21 PROCEDURE — 3044F HG A1C LEVEL LT 7.0%: CPT | Mod: CPTII,,, | Performed by: STUDENT IN AN ORGANIZED HEALTH CARE EDUCATION/TRAINING PROGRAM

## 2024-05-21 RX ORDER — AMLODIPINE BESYLATE 5 MG/1
5 TABLET ORAL DAILY
Qty: 90 TABLET | Refills: 3 | Status: SHIPPED | OUTPATIENT
Start: 2024-05-21 | End: 2025-05-21

## 2024-05-21 RX ORDER — ROSUVASTATIN CALCIUM 5 MG/1
5 TABLET, COATED ORAL DAILY
Qty: 90 TABLET | Refills: 3 | Status: SHIPPED | OUTPATIENT
Start: 2024-05-21 | End: 2025-05-21

## 2024-05-22 NOTE — ASSESSMENT & PLAN NOTE
Patient's hypertension is not controlled on Entresto and metoprolol along with Lasix adding amlodipine for patient at 5 mg blood pressure in clinic today 150/80

## 2024-05-22 NOTE — PROGRESS NOTES
Patient Name: Isaías Prasad     : 1965    MRN: [id number]     Subjective:     Patient ID: Isaías Prasad is a 58 y.o. male.    Chief Complaint:   Chief Complaint   Patient presents with    Follow-up     Patient here for follow up. BP done twice. 153/93 then 150/80        HPI: HPI  58 year old male presents for follow up of CHF and impaired glucose tolerance Patient has a  medical history of hypertension, hyperlipidemia, TIA in 2019  Tobacco and polysubstance use , colostomy with reversal,      Memory Loss/Vascular Dementia  Patient evaluated in Geriatrics clinic and noted vascular dementia likely 2/2 to HTN and polysubstance use. (Amphetamines, current use of ETOH)  Denies early family history of Alzheimer's     EF 20%/hypertension  Presented to   ED 2022 with new onset shortness of breath  Was found to be in congestive heart failure with EF of 20%  Patient started on Entresto     metoprolol succinate 25 mg   Lasix 40 mg daily by Cardiology  Did see cardiology in July  Still not wearing LifeVest states that he is feeling well   B{ 150/80  Reports he has been taking all medications    Cigarette use  Referred to smoking cessation  1/2 ppd for 31 years       Hyperlipidemia   Lipitor 40  Aspirin 81 mg     Impaired glucose tolerance  A1c 6.1   Metformin 500 mg extended release  Jardiance 10 mg daily (started by cardiology)    CKD III noted on labs       Med hx as above  Social hx- Smoker. Works as a    ROS:      ROS     12 point review of systems conducted, negative except as stated in the history of present illness. See HPI for details.    History:     Past Medical History:   Diagnosis Date    Disruption of external operation (surgical) wound, not elsewhere classified, initial encounter 2022    Open abdominal wall wound 2022    Status post colostomy 2022    Tobacco user 2022    Transient ischemic attack 2022        Past Surgical History:   Procedure Laterality Date     COLOSTOMY      EXAMINATION UNDER ANESTHESIA N/A 5/18/2022    Procedure: Exam under anesthesia, removal of rectal foreign body;  Surgeon: Chauncey Jimenez Jr., MD;  Location: [facility];  Service: General;  Laterality: N/A;  David/Scheuermann, possible ex lap       No family history on file.     Social History     Tobacco Use    Smoking status: Some Days     Current packs/day: 0.50     Average packs/day: 0.5 packs/day for 9.0 years (4.5 ttl pk-yrs)     Types: Cigarettes     Passive exposure: Never    Smokeless tobacco: Never    Tobacco comments:     Smoked for 6 years then quit and started back smoking now for 3     years   Substance and Sexual Activity    Alcohol use: Not Currently     Alcohol/week: 7.0 standard drinks of alcohol     Types: 6 Cans of beer, 1 Drinks containing 0.5 oz of alcohol per week     Comment: twice a week    Drug use: Not Currently     Types: Marijuana, Cocaine     Comment: last cocaine use x1 yr ago, smokes marijana weekly    Sexual activity: Yes     Partners: Female       Current Outpatient Medications   Medication Instructions    amLODIPine (NORVASC) 5 mg, Oral, Daily    aspirin 81 mg, Oral, Daily    buPROPion (WELLBUTRIN XL) 150 mg, Oral, Daily    empagliflozin (JARDIANCE) 25 mg, Oral, Daily    folic acid (FOLVITE) 1 mg, Oral, Daily    furosemide (LASIX) 20 mg, Oral, 2 times daily    metFORMIN (GLUCOPHAGE-XR) 500 mg, Oral    metoprolol succinate (TOPROL-XL) 25 mg, Oral, Daily    nicotine (NICODERM CQ) 14 mg/24 hr 1 patch, Transdermal, Daily    nicotine (NICODERM CQ) 21 mg/24 hr 1 patch, Transdermal, Daily    nicotine (NICODERM CQ) 7 mg/24 hr 1 patch, Transdermal, Daily    rosuvastatin (CRESTOR) 5 mg, Oral, Daily    sacubitriL-valsartan (ENTRESTO) 24-26 mg per tablet 1 tablet, Oral, 2 times daily        Review of patient's allergies indicates:  No Known Allergies    Objective:     Visit Vitals  BP (!) 150/80 (BP Location: Right arm, Patient Position: Sitting)   Pulse 80   Ht 6' (1.829 m)    Wt 88.5 kg (195 lb)   SpO2 100%   BMI 26.45 kg/m²       Physical Examination:     Physical Exam  Constitutional:       General: He is not in acute distress.     Appearance: Normal appearance. He is not ill-appearing or diaphoretic.   HENT:      Nose: No congestion.   Eyes:      Conjunctiva/sclera: Conjunctivae normal.      Pupils: Pupils are equal, round, and reactive to light.   Cardiovascular:      Rate and Rhythm: Normal rate and regular rhythm.      Heart sounds: No murmur heard.  Pulmonary:      Effort: Pulmonary effort is normal. No respiratory distress.      Breath sounds: Normal breath sounds. No wheezing.   Musculoskeletal:         General: No swelling, tenderness, deformity or signs of injury. Normal range of motion.      Cervical back: Normal range of motion.   Skin:     General: Skin is warm and dry.      Coloration: Skin is not jaundiced.   Neurological:      Mental Status: He is alert and oriented to person, place, and time. Mental status is at baseline.      Gait: Gait normal.         Lab Results:     Chemistry:  Lab Results   Component Value Date     05/21/2024    K 4.4 05/21/2024    CHLORIDE 107 05/21/2024    BUN 31.2 (H) 05/21/2024    CREATININE 1.34 (H) 05/21/2024    EGFRNORACEVR >60 05/21/2024    GLUCOSE 87 05/21/2024    CALCIUM 9.4 05/21/2024    ALKPHOS 92 02/16/2024    LABPROT 6.9 02/16/2024    ALBUMIN 3.9 02/16/2024    BILIDIR 0.1 10/26/2019    IBILI 0.4 10/26/2019    AST 27 02/16/2024    ALT 26 02/16/2024    MG 2.00 05/30/2023    PHOS 2.6 10/19/2022    [id number] 34.4 02/16/2024    TSH 0.180 (L) 05/21/2024    PBVNFK2HFVP 0.94 05/21/2024    PSA 1.95 02/16/2024        Lab Results   Component Value Date    HGBA1C 5.7 05/21/2024        Hematology:  Lab Results   Component Value Date    WBC 8.24 02/16/2024    HGB 14.9 02/16/2024    HCT 42.9 02/16/2024     02/16/2024       Lipid Panel:  Lab Results   Component Value Date    CHOL 190 02/16/2024    HDL 55 02/16/2024    .00  02/16/2024    TRIG 147 (H) 02/16/2024    TOTALCHOLEST 3 02/16/2024        Urine:  Lab Results   Component Value Date    COLORUA Light-Yellow 12/14/2022    APPEARANCEUA Clear 12/14/2022    SGUA 1.016 12/14/2022    PHUA 7.0 12/14/2022    PROTEINUA Negative 12/14/2022    GLUCOSEUA Normal 12/14/2022    KETONESUA Negative 12/14/2022    BLOODUA Negative 12/14/2022    NITRITESUA Negative 12/14/2022    LEUKOCYTESUR Negative 12/14/2022    RBCUA 0-5 12/14/2022    WBCUA 0-5 12/14/2022    BACTERIA None Seen 12/14/2022    SQEPUA Trace (A) 12/14/2022    HYALINECASTS None Seen 12/14/2022    CREATRANDUR 105.3 02/16/2024        Assessment:          ICD-10-CM ICD-9-CM   1. Encounter for colorectal cancer screening  Z12.11 V76.51    Z12.12 V76.41   2. Mild dementia associated with alcoholism, with mood disturbance  F10.27 291.2   3. Stage 3a chronic kidney disease  N18.31 585.3   4. Impaired glucose tolerance  R73.02 790.22   5. Hypertension, unspecified type  I10 401.9   6. Acute congestive heart failure, unspecified heart failure type  I50.9 428.0   7. Hyperlipidemia, unspecified hyperlipidemia type  E78.5 272.4   8. Cigarette smoker  F17.210 305.1   9. Tobacco user  Z72.0 305.1        Plan:     1. Encounter for colorectal cancer screening  -     Cologuard Screening (Multitarget Stool DNA); Future; Expected date: 05/21/2024    2. Mild dementia associated with alcoholism, with mood disturbance  Assessment & Plan:  Advised alcohol cessation      Orders:  -     TSH; Future; Expected date: 05/21/2024  -     T4, Free; Future; Expected date: 05/21/2024    3. Stage 3a chronic kidney disease  Overview:  Continuing to monitor.     Orders:  -     Basic Metabolic Panel; Future; Expected date: 05/21/2024    4. Impaired glucose tolerance  Assessment & Plan:  Continue Jardiance at 25 mg daily  Refills have been issued for patient    Orders:  -     Hemoglobin A1C; Future; Expected date: 05/21/2024    5. Hypertension, unspecified type  Assessment &  Plan:  Patient's hypertension is not controlled on Entresto and metoprolol along with Lasix adding amlodipine for patient at 5 mg blood pressure in clinic today 150/80    Orders:  -     amLODIPine (NORVASC) 5 MG tablet; Take 1 tablet (5 mg total) by mouth once daily.  Dispense: 90 tablet; Refill: 3    6. Acute congestive heart failure, unspecified heart failure type  -     rosuvastatin (CRESTOR) 5 MG tablet; Take 1 tablet (5 mg total) by mouth once daily.  Dispense: 90 tablet; Refill: 3    7. Hyperlipidemia, unspecified hyperlipidemia type  -     rosuvastatin (CRESTOR) 5 MG tablet; Take 1 tablet (5 mg total) by mouth once daily.  Dispense: 90 tablet; Refill: 3    8. Cigarette smoker  Assessment & Plan:  Spent 4 minutes discussing smoking cessation and have assured that patient does have refills of Wellbutrin   Also prescribed nicotine patches for patient with instructions on use   Discussed role of cigarette smoking and patient has vascular dementia      9. Tobacco user  Assessment & Plan:  See below did discuss with patient smoking cessation           No follow-ups on file.    Future Appointments   Date Time Provider Department Center   6/24/2024 10:20 AM Ann Marie Lin MD [facility]   9/12/2024  1:00 PM GERIATRICS, [facility] FAMILY MEDICINE [facility] Joel Lin MD

## 2024-05-22 NOTE — ASSESSMENT & PLAN NOTE
Spent 4 minutes discussing smoking cessation and have assured that patient does have refills of Wellbutrin   Also prescribed nicotine patches for patient with instructions on use   Discussed role of cigarette smoking and patient has vascular dementia

## 2024-06-24 ENCOUNTER — OFFICE VISIT (OUTPATIENT)
Dept: FAMILY MEDICINE | Facility: CLINIC | Age: 59
End: 2024-06-24
Payer: COMMERCIAL

## 2024-06-24 VITALS
DIASTOLIC BLOOD PRESSURE: 84 MMHG | WEIGHT: 189 LBS | HEART RATE: 72 BPM | OXYGEN SATURATION: 97 % | TEMPERATURE: 98 F | BODY MASS INDEX: 25.6 KG/M2 | SYSTOLIC BLOOD PRESSURE: 127 MMHG | HEIGHT: 72 IN

## 2024-06-24 DIAGNOSIS — Z23 ENCOUNTER FOR IMMUNIZATION: Primary | ICD-10-CM

## 2024-06-24 DIAGNOSIS — I10 HYPERTENSION, UNSPECIFIED TYPE: ICD-10-CM

## 2024-06-24 DIAGNOSIS — R73.02 IGT (IMPAIRED GLUCOSE TOLERANCE): ICD-10-CM

## 2024-06-24 DIAGNOSIS — L28.2 PRURITIC RASH: ICD-10-CM

## 2024-06-24 DIAGNOSIS — B02.9 HERPES ZOSTER WITHOUT COMPLICATION: ICD-10-CM

## 2024-06-24 DIAGNOSIS — N18.31 STAGE 3A CHRONIC KIDNEY DISEASE: ICD-10-CM

## 2024-06-24 DIAGNOSIS — R73.02 IMPAIRED GLUCOSE TOLERANCE: ICD-10-CM

## 2024-06-24 PROCEDURE — 3008F BODY MASS INDEX DOCD: CPT | Mod: CPTII,,, | Performed by: STUDENT IN AN ORGANIZED HEALTH CARE EDUCATION/TRAINING PROGRAM

## 2024-06-24 PROCEDURE — 3074F SYST BP LT 130 MM HG: CPT | Mod: CPTII,,, | Performed by: STUDENT IN AN ORGANIZED HEALTH CARE EDUCATION/TRAINING PROGRAM

## 2024-06-24 PROCEDURE — 99213 OFFICE O/P EST LOW 20 MIN: CPT | Mod: PBBFAC,PN,25 | Performed by: STUDENT IN AN ORGANIZED HEALTH CARE EDUCATION/TRAINING PROGRAM

## 2024-06-24 PROCEDURE — 90677 PCV20 VACCINE IM: CPT | Mod: PBBFAC,PN

## 2024-06-24 PROCEDURE — 3044F HG A1C LEVEL LT 7.0%: CPT | Mod: CPTII,,, | Performed by: STUDENT IN AN ORGANIZED HEALTH CARE EDUCATION/TRAINING PROGRAM

## 2024-06-24 PROCEDURE — 90472 IMMUNIZATION ADMIN EACH ADD: CPT | Mod: PBBFAC,PN

## 2024-06-24 PROCEDURE — 90750 HZV VACC RECOMBINANT IM: CPT | Mod: PBBFAC,PN

## 2024-06-24 PROCEDURE — 3079F DIAST BP 80-89 MM HG: CPT | Mod: CPTII,,, | Performed by: STUDENT IN AN ORGANIZED HEALTH CARE EDUCATION/TRAINING PROGRAM

## 2024-06-24 PROCEDURE — 3061F NEG MICROALBUMINURIA REV: CPT | Mod: CPTII,,, | Performed by: STUDENT IN AN ORGANIZED HEALTH CARE EDUCATION/TRAINING PROGRAM

## 2024-06-24 PROCEDURE — G0009 ADMIN PNEUMOCOCCAL VACCINE: HCPCS | Mod: PBBFAC,PN

## 2024-06-24 PROCEDURE — 99214 OFFICE O/P EST MOD 30 MIN: CPT | Mod: S$PBB,,, | Performed by: STUDENT IN AN ORGANIZED HEALTH CARE EDUCATION/TRAINING PROGRAM

## 2024-06-24 PROCEDURE — 3066F NEPHROPATHY DOC TX: CPT | Mod: CPTII,,, | Performed by: STUDENT IN AN ORGANIZED HEALTH CARE EDUCATION/TRAINING PROGRAM

## 2024-06-24 RX ORDER — BETAMETHASONE VALERATE 1 MG/G
CREAM TOPICAL 2 TIMES DAILY
Qty: 45 G | Refills: 1 | Status: SHIPPED | OUTPATIENT
Start: 2024-06-24

## 2024-06-24 RX ORDER — ACYCLOVIR 800 MG/1
800 TABLET ORAL
Qty: 35 TABLET | Refills: 0 | Status: SHIPPED | OUTPATIENT
Start: 2024-06-24 | End: 2024-07-01

## 2024-06-24 RX ADMIN — PNEUMOCOCCAL 20-VALENT CONJUGATE VACCINE 0.5 ML
2.2; 2.2; 2.2; 2.2; 2.2; 2.2; 2.2; 2.2; 2.2; 2.2; 2.2; 2.2; 2.2; 2.2; 2.2; 2.2; 4.4; 2.2; 2.2; 2.2 INJECTION, SUSPENSION INTRAMUSCULAR at 11:06

## 2024-06-24 RX ADMIN — ZOSTER VACCINE RECOMBINANT, ADJUVANTED 0.5 ML: KIT at 11:06

## 2024-06-24 NOTE — ASSESSMENT & PLAN NOTE
Large area of excoriation with marked erythema  Valacyclovir and betamethasone  Unclear what initial trigger may be however rash seems to be following a dermatome pattern

## 2024-06-24 NOTE — ASSESSMENT & PLAN NOTE
Continue metformin 500 mg daily but increasing Jardiance started by Cardiology to 25 mg daily will decrease metformin in the future

## 2024-06-24 NOTE — PROGRESS NOTES
Patient Name: Isaías Prasad     : 1965    MRN: [id number]     Subjective:     Patient ID: Isaías Prasad is a 58 y.o. male.    Chief Complaint:   Chief Complaint   Patient presents with    Follow-up     Patient here for follow up. Patient has a rash on left upper arm and back that itches. Been there for about a week. Bp 127/84        HPI: HPI  58-year-old male presents to clinic for follow-up of hypertension, impaired glucose tolerance and new issue of rash on his left upper arm,    tolerance   Patient had a TIA in 2019 and polysubstance use    Memory Loss/Vascular Dementia  Patient evaluated in Geriatrics clinic and noted vascular dementia likely 2/2 to HTN and polysubstance use. (Amphetamines, current use of ETOH)  Denies early family history of Alzheimer's  Patient initially reported that he has not drinking alcohol very frequently but then stated that he does drink often     EF 20%/hypertension  Presented to   ED 2022 with new onset shortness of breath  Was found to be in congestive heart failure with EF of 20%  Patient started on Entresto     metoprolol succinate 25 mg   Lasix 40 mg daily by Cardiology  Did see cardiology in July  Still not wearing LifeVest states that he is feeling well   B{ 150/80  Reports he has been taking all medications    Cigarette use  Referred to smoking cessation  1/2 ppd for 31 years       Hyperlipidemia   Lipitor 40  Aspirin 81 mg     Impaired glucose tolerance  A1c 6.1   A1c .7  Metformin 500 mg extended release  Jardiance 10 mg daily (started by cardiology)    CKD III noted on labs        Rash left arm  Patient reports a pruritic rash left arm requiring urgent care visit where he was given prednisone and hydroxyzine  Points to arm with large excoriations in a linear pattern also reports that his back started to itch is well but he does not see a rash there   Med hx as above  Social hx- Smoker. Works as a    ROS:      ROS     12 point review of  systems conducted, negative except as stated in the history of present illness. See HPI for details.    History:     Past Medical History:   Diagnosis Date    Disruption of external operation (surgical) wound, not elsewhere classified, initial encounter 5/30/2022    Open abdominal wall wound 5/30/2022    Status post colostomy 5/30/2022    Tobacco user 5/26/2022    Transient ischemic attack 5/26/2022        Past Surgical History:   Procedure Laterality Date    COLOSTOMY      EXAMINATION UNDER ANESTHESIA N/A 5/18/2022    Procedure: Exam under anesthesia, removal of rectal foreign body;  Surgeon: Chauncey Jimenez Jr., MD;  Location: [facility];  Service: General;  Laterality: N/A;  David/Scheuermann, possible ex lap       No family history on file.     Social History     Tobacco Use    Smoking status: Some Days     Current packs/day: 0.50     Average packs/day: 0.5 packs/day for 9.0 years (4.5 ttl pk-yrs)     Types: Cigarettes     Passive exposure: Never    Smokeless tobacco: Never    Tobacco comments:     Smoked for 6 years then quit and started back smoking now for 3     years   Substance and Sexual Activity    Alcohol use: Not Currently     Alcohol/week: 7.0 standard drinks of alcohol     Types: 6 Cans of beer, 1 Drinks containing 0.5 oz of alcohol per week     Comment: twice a week    Drug use: Not Currently     Types: Marijuana, Cocaine     Comment: last cocaine use x1 yr ago, smokes marijana weekly    Sexual activity: Yes     Partners: Female       Current Outpatient Medications   Medication Instructions    acyclovir (ZOVIRAX) 800 mg, Oral, 5 times daily    amLODIPine (NORVASC) 5 mg, Oral, Daily    aspirin 81 mg, Oral, Daily    betamethasone valerate 0.1% (VALISONE) 0.1 % Crea Topical (Top), 2 times daily    buPROPion (WELLBUTRIN XL) 150 mg, Oral, Daily    empagliflozin (JARDIANCE) 25 mg, Oral, Daily    folic acid (FOLVITE) 1 mg, Oral, Daily    furosemide (LASIX) 20 mg, Oral, 2 times daily    metFORMIN  (GLUCOPHAGE-XR) 500 mg, Oral    metoprolol succinate (TOPROL-XL) 25 mg, Oral, Daily    nicotine (NICODERM CQ) 14 mg/24 hr 1 patch, Transdermal, Daily    nicotine (NICODERM CQ) 21 mg/24 hr 1 patch, Transdermal, Daily    nicotine (NICODERM CQ) 7 mg/24 hr 1 patch, Transdermal, Daily    rosuvastatin (CRESTOR) 5 mg, Oral, Daily    sacubitriL-valsartan (ENTRESTO) 24-26 mg per tablet 1 tablet, Oral, 2 times daily        Review of patient's allergies indicates:  No Known Allergies    Objective:     Visit Vitals  /84 (BP Location: Right arm, Patient Position: Sitting)   Pulse 72   Temp 98.4 °F (36.9 °C) (Oral)   Ht 6' (1.829 m)   Wt 85.7 kg (189 lb)   SpO2 97%   BMI 25.63 kg/m²       Physical Examination:     Physical Exam  Constitutional:       General: He is not in acute distress.     Appearance: Normal appearance. He is not ill-appearing or diaphoretic.   HENT:      Nose: No congestion.   Eyes:      Conjunctiva/sclera: Conjunctivae normal.      Pupils: Pupils are equal, round, and reactive to light.   Cardiovascular:      Rate and Rhythm: Normal rate and regular rhythm.      Heart sounds: No murmur heard.  Pulmonary:      Effort: Pulmonary effort is normal. No respiratory distress.      Breath sounds: Normal breath sounds. No wheezing.   Musculoskeletal:         General: No swelling, tenderness, deformity or signs of injury. Normal range of motion.      Cervical back: Normal range of motion.   Skin:     General: Skin is warm and dry.      Coloration: Skin is not jaundiced.   Neurological:      Mental Status: He is alert and oriented to person, place, and time. Mental status is at baseline.      Gait: Gait normal.         Lab Results:     Chemistry:  Lab Results   Component Value Date     05/21/2024    K 4.4 05/21/2024    BUN 31.2 (H) 05/21/2024    CREATININE 1.34 (H) 05/21/2024    EGFRNORACEVR >60 05/21/2024    GLUCOSE 87 05/21/2024    CALCIUM 9.4 05/21/2024    ALKPHOS 92 02/16/2024    LABPROT 6.9 02/16/2024     ALBUMIN 3.9 02/16/2024    BILIDIR 0.1 10/26/2019    IBILI 0.4 10/26/2019    AST 27 02/16/2024    ALT 26 02/16/2024    MG 2.00 05/30/2023    PHOS 2.6 10/19/2022    [id number] 34.4 02/16/2024    TSH 0.180 (L) 05/21/2024    [id number] 0.94 05/21/2024    PSA 1.95 02/16/2024        Lab Results   Component Value Date    HGBA1C 5.7 05/21/2024        Hematology:  Lab Results   Component Value Date    WBC 8.24 02/16/2024    HGB 14.9 02/16/2024    HCT 42.9 02/16/2024     02/16/2024       Lipid Panel:  Lab Results   Component Value Date    CHOL 190 02/16/2024    HDL 55 02/16/2024    .00 02/16/2024    TRIG 147 (H) 02/16/2024    TOTALCHOLEST 3 02/16/2024        Urine:  Lab Results   Component Value Date    APPEARANCEUA Clear 12/14/2022    SGUA 1.016 12/14/2022    PROTEINUA Negative 12/14/2022    KETONESUA Negative 12/14/2022    LEUKOCYTESUR Negative 12/14/2022    RBCUA 0-5 12/14/2022    WBCUA 0-5 12/14/2022    BACTERIA None Seen 12/14/2022    SQEPUA Trace (A) 12/14/2022    HYALINECASTS None Seen 12/14/2022    CREATRANDUR 105.3 02/16/2024        Assessment:          ICD-10-CM ICD-9-CM   1. Encounter for immunization  Z23 V03.89   2. IGT (impaired glucose tolerance)  R73.02 790.22   3. Herpes zoster without complication  B02.9 053.9   4. Pruritic rash  L28.2 698.2   5. Hypertension, unspecified type  I10 401.9   6. Impaired glucose tolerance  R73.02 790.22   7. Stage 3a chronic kidney disease  N18.31 585.3        Plan:     1. Encounter for immunization  -     pneumoc 20-whitney conj-dip cr(PF) (PREVNAR-20 (PF)) injection Syrg 0.5 mL  -     varicella-zoster [id number] (PF)(SHINGRIX) 50 mcg/0.5 mL KIT    2. IGT (impaired glucose tolerance)  -     empagliflozin (JARDIANCE) 25 mg tablet; Take 1 tablet (25 mg total) by mouth once daily.  Dispense: 90 tablet; Refill: 3    3. Herpes zoster without complication  -     acyclovir (ZOVIRAX) 800 MG Tab; Take 1 tablet (800 mg total) by mouth 5 (five) times daily. for 7 days   Dispense: 35 tablet; Refill: 0    4. Pruritic rash  Assessment & Plan:  Large area of excoriation with marked erythema  Valacyclovir and betamethasone  Unclear what initial trigger may be however rash seems to be following a dermatome pattern    Orders:  -     betamethasone valerate 0.1% (VALISONE) 0.1 % Crea; Apply topically 2 (two) times daily.  Dispense: 45 g; Refill: 1    5. Hypertension, unspecified type  Assessment & Plan:  127/84 continue metoprolol succinate 25 mg daily Lasix 20 mg b.i.d. amlodipine 5 mg daily      6. Impaired glucose tolerance  Assessment & Plan:  Continue metformin 500 mg daily but increasing Jardiance started by Cardiology to 25 mg daily will decrease metformin in the future      7. Stage 3a chronic kidney disease  Overview:  Continuing to monitor.     Assessment & Plan:  Discussed alcohol cessation with patient, limiting salt to 2 g per day, no NSAID use, maintain hydration, exercise           Follow up in about 3 months (around 9/24/2024) for a1c, BP check.    Future Appointments   Date Time Provider Department Center   9/12/2024  1:00 PM GERIATRICS, [facility] FAMILY MEDICINE [facility]   9/24/2024  8:00 AM Ann Marie Lin MD [facility]        Ann Marie Lin MD

## 2024-06-24 NOTE — ASSESSMENT & PLAN NOTE
Discussed alcohol cessation with patient, limiting salt to 2 g per day, no NSAID use, maintain hydration, exercise

## 2024-08-12 DIAGNOSIS — R73.02 IGT (IMPAIRED GLUCOSE TOLERANCE): ICD-10-CM

## 2024-08-12 DIAGNOSIS — E78.5 HYPERLIPIDEMIA, UNSPECIFIED HYPERLIPIDEMIA TYPE: ICD-10-CM

## 2024-08-12 DIAGNOSIS — I10 HYPERTENSION, UNSPECIFIED TYPE: ICD-10-CM

## 2024-08-12 DIAGNOSIS — I50.21 ACUTE SYSTOLIC CONGESTIVE HEART FAILURE: ICD-10-CM

## 2024-08-12 DIAGNOSIS — R73.02 IMPAIRED GLUCOSE TOLERANCE: ICD-10-CM

## 2024-08-12 DIAGNOSIS — I50.9 ACUTE CONGESTIVE HEART FAILURE, UNSPECIFIED HEART FAILURE TYPE: ICD-10-CM

## 2024-08-12 RX ORDER — AMLODIPINE BESYLATE 5 MG/1
5 TABLET ORAL DAILY
Qty: 90 TABLET | Refills: 3 | Status: SHIPPED | OUTPATIENT
Start: 2024-08-12 | End: 2025-08-12

## 2024-08-12 RX ORDER — ROSUVASTATIN CALCIUM 5 MG/1
5 TABLET, COATED ORAL DAILY
Qty: 90 TABLET | Refills: 3 | Status: SHIPPED | OUTPATIENT
Start: 2024-08-12 | End: 2025-08-12

## 2024-08-12 RX ORDER — METOPROLOL SUCCINATE 25 MG/1
25 TABLET, EXTENDED RELEASE ORAL DAILY
Qty: 90 TABLET | Refills: 6 | Status: SHIPPED | OUTPATIENT
Start: 2024-08-12 | End: 2026-05-04

## 2024-08-12 RX ORDER — METFORMIN HYDROCHLORIDE 500 MG/1
500 TABLET, EXTENDED RELEASE ORAL DAILY
Qty: 90 TABLET | Refills: 3 | Status: SHIPPED | OUTPATIENT
Start: 2024-08-12

## 2024-08-12 RX ORDER — SACUBITRIL AND VALSARTAN 24; 26 MG/1; MG/1
1 TABLET, FILM COATED ORAL 2 TIMES DAILY
Qty: 60 TABLET | Refills: 6 | Status: SHIPPED | OUTPATIENT
Start: 2024-08-12

## 2024-09-24 ENCOUNTER — OFFICE VISIT (OUTPATIENT)
Dept: FAMILY MEDICINE | Facility: CLINIC | Age: 59
End: 2024-09-24
Payer: COMMERCIAL

## 2024-09-24 VITALS
TEMPERATURE: 98 F | OXYGEN SATURATION: 95 % | DIASTOLIC BLOOD PRESSURE: 85 MMHG | BODY MASS INDEX: 26.01 KG/M2 | SYSTOLIC BLOOD PRESSURE: 131 MMHG | HEART RATE: 76 BPM | HEIGHT: 72 IN | WEIGHT: 192 LBS

## 2024-09-24 DIAGNOSIS — I10 HYPERTENSION, UNSPECIFIED TYPE: ICD-10-CM

## 2024-09-24 DIAGNOSIS — F32.1 CURRENT MODERATE EPISODE OF MAJOR DEPRESSIVE DISORDER WITHOUT PRIOR EPISODE: ICD-10-CM

## 2024-09-24 DIAGNOSIS — R73.02 IMPAIRED GLUCOSE TOLERANCE: ICD-10-CM

## 2024-09-24 DIAGNOSIS — Z23 ENCOUNTER FOR IMMUNIZATION: Primary | ICD-10-CM

## 2024-09-24 DIAGNOSIS — J30.1 SEASONAL ALLERGIC RHINITIS DUE TO POLLEN: ICD-10-CM

## 2024-09-24 DIAGNOSIS — I50.21 ACUTE SYSTOLIC CONGESTIVE HEART FAILURE: ICD-10-CM

## 2024-09-24 DIAGNOSIS — F32.2 CURRENT SEVERE EPISODE OF MAJOR DEPRESSIVE DISORDER WITHOUT PSYCHOTIC FEATURES WITHOUT PRIOR EPISODE: ICD-10-CM

## 2024-09-24 LAB — HBA1C MFR BLD: 6 %

## 2024-09-24 PROCEDURE — G0008 ADMIN INFLUENZA VIRUS VAC: HCPCS | Mod: PBBFAC,PN

## 2024-09-24 PROCEDURE — 90750 HZV VACC RECOMBINANT IM: CPT | Mod: PBBFAC,PN

## 2024-09-24 PROCEDURE — 90472 IMMUNIZATION ADMIN EACH ADD: CPT | Mod: PBBFAC,PN

## 2024-09-24 PROCEDURE — 83036 HEMOGLOBIN GLYCOSYLATED A1C: CPT | Mod: PBBFAC,PN | Performed by: STUDENT IN AN ORGANIZED HEALTH CARE EDUCATION/TRAINING PROGRAM

## 2024-09-24 PROCEDURE — 90656 IIV3 VACC NO PRSV 0.5 ML IM: CPT | Mod: PBBFAC,PN

## 2024-09-24 PROCEDURE — 99214 OFFICE O/P EST MOD 30 MIN: CPT | Mod: PBBFAC,PN,25 | Performed by: STUDENT IN AN ORGANIZED HEALTH CARE EDUCATION/TRAINING PROGRAM

## 2024-09-24 RX ORDER — ARIPIPRAZOLE 5 MG/1
5 TABLET ORAL DAILY
Qty: 30 TABLET | Refills: 11 | Status: SHIPPED | OUTPATIENT
Start: 2024-09-24 | End: 2025-09-24

## 2024-09-24 RX ORDER — CETIRIZINE HYDROCHLORIDE 10 MG/1
10 TABLET ORAL DAILY
Qty: 90 TABLET | Refills: 3 | Status: SHIPPED | OUTPATIENT
Start: 2024-09-24 | End: 2025-09-24

## 2024-09-24 RX ORDER — SACUBITRIL AND VALSARTAN 24; 26 MG/1; MG/1
1 TABLET, FILM COATED ORAL 2 TIMES DAILY
Qty: 60 TABLET | Refills: 6 | Status: SHIPPED | OUTPATIENT
Start: 2024-09-24

## 2024-09-24 RX ORDER — FLUTICASONE PROPIONATE 50 MCG
1 SPRAY, SUSPENSION (ML) NASAL DAILY
Qty: 18.2 ML | Refills: 11 | Status: SHIPPED | OUTPATIENT
Start: 2024-09-24

## 2024-09-24 RX ADMIN — ZOSTER VACCINE RECOMBINANT, ADJUVANTED 0.5 ML: KIT at 08:09

## 2024-09-24 RX ADMIN — INFLUENZA VIRUS VACCINE 0.5 ML: 15; 15; 15 SUSPENSION INTRAMUSCULAR at 08:09

## 2024-09-24 NOTE — ASSESSMENT & PLAN NOTE
Patient is currently taking Wellbutrin 150 mg daily  Will augment with Abilify 5 mg daily  Patient also given information on [facility]  No SI at this time no need for pec

## 2024-09-24 NOTE — ASSESSMENT & PLAN NOTE
Blood pressure is controlled 131/85 continuing amlodipine 5 mg daily patient is currently on Entresto as well via Cardiology

## 2024-09-24 NOTE — ASSESSMENT & PLAN NOTE
A1c 6.0 continuing metformin 500 mg extended release and Jardiance 25 mg for blood sugar control as well as heart protection

## 2024-09-24 NOTE — ASSESSMENT & PLAN NOTE
Flonase nasal spray with instructions on use and Zyrtec 10 mg daily  Returned to clinic precautions given

## 2024-09-24 NOTE — PROGRESS NOTES
Patient Name: Isaías Prasad     : 1965    MRN: [id number]     Subjective:     Patient ID: Isaías Prasad is a 59 y.o. male.    Chief Complaint:   Chief Complaint   Patient presents with    Hypertension     Patient here to follow up for hypertension. He also reports that he is getting depressed that has been going on for the past month. Bp 131/85. Has had suicidal thoughts in the past two weeks.         HPI: HPI  58-year-old male presents to clinic for follow-up of hypertension, impaired glucose tolerance, and new issue of depression for the past month.      Depression  Is taking Wellbutrin for depression.  Reports incompletely alleviating this.   Reports that he had passive SI a month ago with no plan.   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 (Amphetamines, current use of ETOH)  Denies early family history of Alzheimer's  Patient initially reported that he has not drinking alcohol very frequently but then stated that he does drink often     EF 20%/hypertension  Presented to   ED 2022 with new onset shortness of breath  Was found to be in congestive heart failure with EF of 20%  Patient started on Entresto     metoprolol succinate 25 mg   Lasix 40 mg daily by Cardiology  Did see cardiology in July  Still not wearing LifeVest states that he is feeling well   B{ 150/80  Reports he has been taking all medications    Cigarette use  Referred to smoking cessation  1/2 ppd for 31 years       Impaired glucose tolerance  A1c 6.1   A1c .7  Metformin 500 mg extended release  Jardiance 25 mg daily  Due for A1c today   CKD III noted on labs        ROS:      ROS     12 point review of  systems conducted, negative except as stated in the history of present illness. See HPI for details.    History:     Past Medical History:   Diagnosis Date    Disruption of external operation (surgical) wound, not elsewhere classified, initial encounter 5/30/2022    Open abdominal wall wound 5/30/2022    Status post colostomy 5/30/2022    Tobacco user 5/26/2022    Transient ischemic attack 5/26/2022        Past Surgical History:   Procedure Laterality Date    COLOSTOMY      EXAMINATION UNDER ANESTHESIA N/A 5/18/2022    Procedure: Exam under anesthesia, removal of rectal foreign body;  Surgeon: Chauncey Jimenez Jr., MD;  Location: [facility];  Service: General;  Laterality: N/A;  David/Scheuermann, possible ex lap       No family history on file.     Social History     Tobacco Use    Smoking status: Some Days     Current packs/day: 0.50     Average packs/day: 0.5 packs/day for 9.0 years (4.5 ttl pk-yrs)     Types: Cigarettes     Passive exposure: Never    Smokeless tobacco: Never    Tobacco comments:     Smoked for 6 years then quit and started back smoking now for 3     years   Substance and Sexual Activity    Alcohol use: Not Currently     Alcohol/week: 7.0 standard drinks of alcohol     Types: 6 Cans of beer, 1 Drinks containing 0.5 oz of alcohol per week     Comment: twice a week    Drug use: Not Currently     Types: Marijuana, Cocaine     Comment: last cocaine use x1 yr ago, smokes marijana weekly    Sexual activity: Yes     Partners: Female       Current Outpatient Medications   Medication Instructions    amLODIPine (NORVASC) 5 mg, Oral, Daily    ARIPiprazole (ABILIFY) 5 mg, Oral, Daily    aspirin 81 mg, Oral, Daily    buPROPion (WELLBUTRIN XL) 150 mg, Oral, Daily    cetirizine (ZYRTEC) 10 mg, Oral, Daily    empagliflozin (JARDIANCE) 25 mg, Oral, Daily    fluticasone propionate (FLONASE) 50 mcg, Each Nostril, Daily    folic acid (FOLVITE) 1 mg, Oral, Daily    metFORMIN (GLUCOPHAGE-XR) 500 mg, Oral, Daily     metoprolol succinate (TOPROL-XL) 25 mg, Oral, Daily    nicotine (NICODERM CQ) 14 mg/24 hr 1 patch, Transdermal, Daily    rosuvastatin (CRESTOR) 5 mg, Oral, Daily    sacubitriL-valsartan (ENTRESTO) 24-26 mg per tablet 1 tablet, Oral, 2 times daily        Review of patient's allergies indicates:  No Known Allergies    Objective:     Visit Vitals  /85 (BP Location: Right arm, Patient Position: Sitting)   Pulse 76   Temp 97.9 °F (36.6 °C) (Oral)   Ht 6' (1.829 m)   Wt 87.1 kg (192 lb)   SpO2 95%   BMI 26.04 kg/m²       Physical Examination:     Physical Exam  Constitutional:       General: He is not in acute distress.     Appearance: Normal appearance. He is not ill-appearing or diaphoretic.   HENT:      Nose: Congestion present.   Eyes:      Conjunctiva/sclera: Conjunctivae normal.      Pupils: Pupils are equal, round, and reactive to light.   Cardiovascular:      Rate and Rhythm: Normal rate and regular rhythm.      Heart sounds: No murmur heard.  Pulmonary:      Effort: Pulmonary effort is normal. No respiratory distress.      Breath sounds: Normal breath sounds. No wheezing.   Musculoskeletal:         General: No swelling, tenderness, deformity or signs of injury. Normal range of motion.      Cervical back: Normal range of motion.   Skin:     General: Skin is warm and dry.      Coloration: Skin is not jaundiced.   Neurological:      General: No focal deficit present.      Mental Status: He is alert and oriented to person, place, and time. Mental status is at baseline.      Gait: Gait normal.   Psychiatric:         Behavior: Behavior normal.         Thought Content: Thought content normal.         Lab Results:     Chemistry:  Lab Results   Component Value Date     05/21/2024    K 4.4 05/21/2024    BUN 31.2 (H) 05/21/2024    CREATININE 1.34 (H) 05/21/2024    EGFRNORACEVR >60 05/21/2024    GLUCOSE 87 05/21/2024    CALCIUM 9.4 05/21/2024    ALKPHOS 92 02/16/2024    LABPROT 6.9 02/16/2024    ALBUMIN 3.9  02/16/2024    BILIDIR 0.1 10/26/2019    IBILI 0.4 10/26/2019    AST 27 02/16/2024    ALT 26 02/16/2024    MG 2.00 05/30/2023    PHOS 2.6 10/19/2022    CDLKZDCK13PJ 34.4 02/16/2024    TSH 0.180 (L) 05/21/2024    ULJCQB8PQYB 0.94 05/21/2024    PSA 1.95 02/16/2024        Lab Results   Component Value Date    HGBA1C 5.7 05/21/2024        Hematology:  Lab Results   Component Value Date    WBC 8.24 02/16/2024    HGB 14.9 02/16/2024    HCT 42.9 02/16/2024     02/16/2024       Lipid Panel:  Lab Results   Component Value Date    CHOL 190 02/16/2024    HDL 55 02/16/2024    .00 02/16/2024    TRIG 147 (H) 02/16/2024    TOTALCHOLEST 3 02/16/2024        Urine:  Lab Results   Component Value Date    APPEARANCEUA Clear 12/14/2022    SGUA 1.016 12/14/2022    PROTEINUA Negative 12/14/2022    KETONESUA Negative 12/14/2022    LEUKOCYTESUR Negative 12/14/2022    RBCUA 0-5 12/14/2022    WBCUA 0-5 12/14/2022    BACTERIA None Seen 12/14/2022    SQEPUA Trace (A) 12/14/2022    HYALINECASTS None Seen 12/14/2022    CREATRANDUR 105.3 02/16/2024        Assessment:          ICD-10-CM ICD-9-CM   1. Encounter for immunization  Z23 V03.89   2. Impaired glucose tolerance  R73.02 790.22   3. Seasonal allergic rhinitis due to pollen  J30.1 477.0   4. Current severe episode of major depressive disorder without psychotic features without prior episode  F32.2 296.23   5. Current moderate episode of major depressive disorder without prior episode  F32.1 296.22   6. Hypertension, unspecified type  I10 401.9        Plan:     1. Encounter for immunization  -     influenza (Flulaval, Fluzone, Fluarix) 45 mcg/0.5 mL IM vaccine (> or = 6 mo) 0.5 mL  -     varicella zoster (Shingrix) IM vaccine (>/= 49 yo)    2. Impaired glucose tolerance  Assessment & Plan:  A1c 6.0 continuing metformin 500 mg extended release and Jardiance 25 mg for blood sugar control as well as heart protection    Orders:  -     POCT HEMOGLOBIN A1C    3. Seasonal allergic  rhinitis due to pollen  Assessment & Plan:  Flonase nasal spray with instructions on use and Zyrtec 10 mg daily  Returned to clinic precautions given    Orders:  -     fluticasone propionate (FLONASE) 50 mcg/actuation nasal spray; 1 spray (50 mcg total) by Each Nostril route once daily.  Dispense: 18.2 mL; Refill: 11  -     cetirizine (ZYRTEC) 10 MG tablet; Take 1 tablet (10 mg total) by mouth once daily.  Dispense: 90 tablet; Refill: 3    4. Current severe episode of major depressive disorder without psychotic features without prior episode  -     ARIPiprazole (ABILIFY) 5 MG Tab; Take 1 tablet (5 mg total) by mouth once daily.  Dispense: 30 tablet; Refill: 11    5. Current moderate episode of major depressive disorder without prior episode  Assessment & Plan:  Patient is currently taking Wellbutrin 150 mg daily  Will augment with Abilify 5 mg daily  Patient also given information [facility]  No SI at this time no need for pec      6. Hypertension, unspecified type  Assessment & Plan:  Blood pressure is controlled 131/85 continuing amlodipine 5 mg daily patient is currently on Entresto as well via Cardiology           Follow up in about 1 month (around 10/24/2024) for med adjustment.    Future Appointments   Date Time Provider Department Center   11/7/2024  8:40 AM Ann Marie Lin MD [facility]   11/14/2024  2:00 PM GERIATRICS, [facility] FAMILY MEDICINE [facility]        Ann Marie Lin MD

## 2025-01-05 ENCOUNTER — HOSPITAL ENCOUNTER (EMERGENCY)
Facility: HOSPITAL | Age: 60
Discharge: HOME | End: 2025-01-05
Attending: EMERGENCY MEDICINE
Payer: COMMERCIAL

## 2025-01-05 VITALS
WEIGHT: 191.81 LBS | TEMPERATURE: 97 F | RESPIRATION RATE: 15 BRPM | DIASTOLIC BLOOD PRESSURE: 83 MMHG | BODY MASS INDEX: 25.98 KG/M2 | OXYGEN SATURATION: 96 % | HEIGHT: 72 IN | SYSTOLIC BLOOD PRESSURE: 133 MMHG | HEART RATE: 72 BPM

## 2025-01-05 DIAGNOSIS — M47.812 OSTEOARTHRITIS OF CERVICAL SPINE, UNSPECIFIED SPINAL OSTEOARTHRITIS COMPLICATION STATUS: Primary | ICD-10-CM

## 2025-01-05 PROCEDURE — 96372 THER/PROPH/DIAG INJ SC/IM: CPT

## 2025-01-05 PROCEDURE — 99284 EMERGENCY DEPT VISIT MOD MDM: CPT | Mod: 25

## 2025-01-05 PROCEDURE — 63600175 PHARM REV CODE 636 W HCPCS

## 2025-01-05 PROCEDURE — 25000003 PHARM REV CODE 250

## 2025-01-05 RX ORDER — DEXAMETHASONE SODIUM PHOSPHATE 4 MG/ML
8 INJECTION, SOLUTION INTRA-ARTICULAR; INTRALESIONAL; INTRAMUSCULAR; INTRAVENOUS; SOFT TISSUE
Status: COMPLETED | OUTPATIENT
Start: 2025-01-05 | End: 2025-01-05

## 2025-01-05 RX ORDER — METHOCARBAMOL 500 MG/1
1000 TABLET, FILM COATED ORAL 3 TIMES DAILY
Qty: 30 TABLET | Refills: 0 | Status: SHIPPED | OUTPATIENT
Start: 2025-01-05 | End: 2025-01-10

## 2025-01-05 RX ORDER — METHYLPREDNISOLONE 4 MG/1
TABLET ORAL
Qty: 21 EACH | Refills: 0 | Status: SHIPPED | OUTPATIENT
Start: 2025-01-05

## 2025-01-05 RX ORDER — CYCLOBENZAPRINE HCL 10 MG
10 TABLET ORAL
Status: DISCONTINUED | OUTPATIENT
Start: 2025-01-05 | End: 2025-01-05

## 2025-01-05 RX ORDER — DICLOFENAC SODIUM 10 MG/G
2 GEL TOPICAL 4 TIMES DAILY
Qty: 2 G | Refills: 0 | Status: SHIPPED | OUTPATIENT
Start: 2025-01-05 | End: 2025-01-19

## 2025-01-05 RX ORDER — METHOCARBAMOL 500 MG/1
1000 TABLET, FILM COATED ORAL
Status: COMPLETED | OUTPATIENT
Start: 2025-01-05 | End: 2025-01-05

## 2025-01-05 RX ADMIN — DEXAMETHASONE SODIUM PHOSPHATE 8 MG: 4 INJECTION, SOLUTION INTRA-ARTICULAR; INTRALESIONAL; INTRAMUSCULAR; INTRAVENOUS; SOFT TISSUE at 10:01

## 2025-01-05 RX ADMIN — METHOCARBAMOL 1000 MG: 500 TABLET ORAL at 10:01

## 2025-01-05 NOTE — ED PROVIDER NOTES
Encounter Date: 1/5/2025       History     Chief Complaint   Patient presents with    Neck Pain     C/o worsening neck pain radiating up into head pain x 1 week. Denies any injury     60 y/o male presenting to [facility] ED with a chief complaint of nontraumatic intermittent left neck pain radiating into left lateral side of his head for one week. The pain is intermittent, last up to 30 minutes, relieves on its own. He is unaware of aggravating and relieving factors. His pain has worsened over the past week. His pain is worse in the morning. He tried one of his wife's baclofen and tylenol with on relief. Denies fever, difficulty swallowing, numbness, tingling, dizziness, vision changes, ear and dental pain.     The history is provided by the patient and the spouse.     Review of patient's allergies indicates:  No Known Allergies  Past Medical History:   Diagnosis Date    Disruption of external operation (surgical) wound, not elsewhere classified, initial encounter 5/30/2022    Open abdominal wall wound 5/30/2022    Status post colostomy 5/30/2022    Tobacco user 5/26/2022    Transient ischemic attack 5/26/2022     Past Surgical History:   Procedure Laterality Date    COLOSTOMY      EXAMINATION UNDER ANESTHESIA N/A 5/18/2022    Procedure: Exam under anesthesia, removal of rectal foreign body;  Surgeon: Chauncey Jimenez Jr., MD;  Location: [facility];  Service: General;  Laterality: N/A;  David/Scheuermann, possible ex lap     No family history on file.  Social History     Tobacco Use    Smoking status: Some Days     Current packs/day: 0.50     Average packs/day: 0.5 packs/day for 9.0 years (4.5 ttl pk-yrs)     Types: Cigarettes     Passive exposure: Never    Smokeless tobacco: Never    Tobacco comments:     Smoked for 6 years then quit and started back smoking now for 3     years   Substance Use Topics    Alcohol use: Not Currently     Alcohol/week: 7.0 standard drinks of alcohol     Types: 6 Cans of beer, 1 Drinks  containing 0.5 oz of alcohol per week     Comment: twice a week    Drug use: Not Currently     Types: Marijuana, Cocaine     Comment: last cocaine use x1 yr ago, smokes marijana weekly     Review of Systems   Constitutional: Negative.    HENT: Negative.     Eyes: Negative.    Respiratory: Negative.     Cardiovascular: Negative.    Gastrointestinal: Negative.    Genitourinary: Negative.    Musculoskeletal:  Positive for myalgias, neck pain and neck stiffness. Negative for arthralgias, back pain, gait problem and joint swelling.        Nontraumatic intermittent  left side neck pain radiating into left side of his head for one week.    Skin: Negative.    Neurological: Negative.        Physical Exam     Initial Vitals [01/05/25 0904]   BP Pulse Resp Temp SpO2   (!) 146/82 72 20 97.2 °F (36.2 °C) 99 %      MAP       --         Physical Exam    Nursing note and vitals reviewed.  Constitutional: He appears well-developed and well-nourished. He is cooperative.  Non-toxic appearance. He does not have a sickly appearance. He does not appear ill. No distress.   HENT:   Head: Normocephalic and atraumatic.   Right Ear: Hearing, tympanic membrane and external ear normal.   Left Ear: Hearing, tympanic membrane and external ear normal.   Nose: Nose normal. Mouth/Throat: Uvula is midline, oropharynx is clear and moist and mucous membranes are normal.   Eyes: Conjunctivae and EOM are normal. Pupils are equal, round, and reactive to light. No scleral icterus.   Neck: Trachea normal and phonation normal. Neck supple. No thyroid mass and no thyromegaly present. No stridor present. No tracheal tenderness present. No tracheal deviation present.       Left trapezius TTP, tight, full ROM, NVI in bilateral upper exremities   Normal range of motion.  Cardiovascular:  Normal rate, regular rhythm, normal heart sounds, intact distal pulses and normal pulses.           Pulmonary/Chest: Effort normal and breath sounds normal. No accessory muscle  usage. No apnea, no tachypnea and no bradypnea. No respiratory distress. He has no decreased breath sounds. He has no wheezes. He has no rhonchi. He has no rales. He exhibits no tenderness.   Normal effort, symmetrical chest rise and fall, no accessory muscle use. Bilateral clear breath sounds in all fields anterior and posterior.      Abdominal: Abdomen is soft. Bowel sounds are normal. He exhibits no distension. There is no abdominal tenderness.   Abdomen is soft, nontender, no peritoneal signs. Tolerating PO fluids.      No right CVA tenderness.  No left CVA tenderness. There is no rebound, no guarding, no tenderness at McBurney's point and negative Key's sign. negative psoas sign and negative Rovsing's sign  Musculoskeletal:         General: Normal range of motion.      Cervical back: Normal range of motion and neck supple. No edema, erythema or rigidity. Spinous process tenderness and muscular tenderness present. Normal range of motion.     Neurological: He is alert and oriented to person, place, and time. He has normal strength. Gait normal. GCS score is 15. GCS eye subscore is 4. GCS verbal subscore is 5. GCS motor subscore is 6.   Skin: Skin is warm and dry. Capillary refill takes less than 2 seconds. No rash noted.   Psychiatric: He has a normal mood and affect. His speech is normal and behavior is normal. Judgment and thought content normal. Cognition and memory are normal.         ED Course   Procedures  Labs Reviewed - No data to display       Imaging Results              X-Ray Cervical Spine 2 or 3 Views (Final result)  Result time 01/05/25 10:40:52      Final result by Krishna Mauro MD (01/05/25 10:40:52)                   Impression:      Multilevel degenerative disc disease and facet arthropathy.      Electronically signed by: Krishna Mauro  Date:    01/05/2025  Time:    10:40               Narrative:    EXAMINATION:  XR CERVICAL SPINE 2 OR 3 VIEWS    CLINICAL HISTORY:  neck  pain;    TECHNIQUE:  Frontal and lateral radiographs of the cervical spine.    COMPARISON:  No relevant comparison studies available at the time of dictation.    FINDINGS:  Vertebral body heights are maintained.  The dens is intact.  Grade 1 anterolisthesis of C3 on C4.  Osteophytosis and moderate to severe disc space narrowing from C4-5 through C6-7.  Multilevel facet arthropathy as well.                                       Medications   dexAMETHasone injection 8 mg (8 mg Intramuscular Given 1/5/25 1011)   methocarbamoL tablet 1,000 mg (1,000 mg Oral Given 1/5/25 1012)     Medical Decision Making  Lumbar fracture, spinal stenosis, epidural abscess, spine osteomyelitis, MS, cauda equina, among others    The patient is a 59 y.o. male who presents to the [facility] Emergency Department with a chief complaint of neck pain radiating into his head causing a headache..  EPIC records were reviewed. Lab work was ordered and reviewed. Findings stated above DJD of cervical region. The patient was provided with decadron muscle relaxer's resulting in  moderate relief of symptoms. The patient was discharged home with a diagnosis of  DJD of cervical region. The patient was advised to rest, ice, rotate tylenol and ibuprofen. It was recommended for the patient to follow up with primary care.  The patient was provided prescriptions for robaxin.  The patient's vital signs and condition remained stable while undergoing evaluation in the Emergency Department. The patient agreed with the plan for care.   The patient is nontoxic appearing, in no acute distress, with stable vital signs and resting comfortably. There is no objective evidence requiring urgent intervention or hospitalization. I provided counseling to the patient with regard to the medical condition, the treatment plan, specific conditions for return, and the importance of follow up. Detailed written and verbal instructions were provided to the patient and he/she expressed a  verbal understanding of the discharge instructions and overall management plan. Reiterated the importance of medication administration and safety, advised the patient to follow up with primary care provider in 3-5 days or sooner if needed. All questions and concerns were addressed before leaving the Emergency Department. The patient is stable for discharge.         Amount and/or Complexity of Data Reviewed  Radiology: ordered. Decision-making details documented in ED Course.    Risk  Prescription drug management.               ED Course as of 25 1026   Sun 2025   1103 X-Ray Cervical Spine 2 or 3 Views  Vertebral body heights are maintained.  The dens is intact.  Grade 1 anterolisthesis of C3 on C4.  Osteophytosis and moderate to severe disc space narrowing from C4-5 through C6-7.  Multilevel facet arthropathy as well.     Impression:     Multilevel degenerative disc disease and facet arthropathy.   [RQ]   1106 11:06 AM I reassessed the pt. The pt is resting comfortably and is NAD. Discussed test results, shared tx plan of DJD cervical, specific conditions for return including numbness weakness incontinence and the need for f/u with his PCP. Answered their questions at this time. Pt understands and agrees with plan at this time. Pt has remained stable through ED course and is stable for discharge.    [RQ]      ED Course User Index  [RQ] Fior Ruiz FNP                           Clinical Impression:  Final diagnoses:  [M47.812] Osteoarthritis of cervical spine, unspecified spinal osteoarthritis complication status (Primary)          ED Disposition Condition    Discharge Stable          ED Prescriptions       Medication Sig Dispense Start Date End Date Auth. Provider    methocarbamoL (ROBAXIN) 500 MG Tab () Take 2 tablets (1,000 mg total) by mouth 3 (three) times daily. for 5 days 30 tablet 2025 1/10/2025 Fior Ruiz FNP    methylPREDNISolone (MEDROL DOSEPACK) 4 mg tablet use as  directed 21 each 1/5/2025 -- Fior Ruiz FNP    diclofenac sodium (VOLTAREN ARTHRITIS PAIN) 1 % Gel Apply 2 g topically 4 (four) times daily. for 14 days 2 g 1/5/2025 1/19/2025 Fior Ruiz FNP          Follow-up Information       Follow up With Specialties Details Why Contact Info    Ann Marie Lin MD Family Medicine In 3 days  [address] [facility] [address] [phone]      [facility] Emergency Medicine  If symptoms worsen [address] [facility] [address] [phone]             Fior Ruiz FNP  01/11/25 1028

## 2025-05-17 DIAGNOSIS — R45.89 DEPRESSED MOOD: ICD-10-CM

## 2025-05-17 DIAGNOSIS — E53.8 FOLATE DEFICIENCY: ICD-10-CM

## 2025-05-19 RX ORDER — BUPROPION HYDROCHLORIDE 150 MG/1
150 TABLET ORAL
Qty: 30 TABLET | Refills: 2 | Status: SHIPPED | OUTPATIENT
Start: 2025-05-19

## 2025-05-19 RX ORDER — FOLIC ACID 1 MG/1
1000 TABLET ORAL
Qty: 90 TABLET | Refills: 3 | Status: SHIPPED | OUTPATIENT
Start: 2025-05-19

## 2025-05-23 ENCOUNTER — HOSPITAL ENCOUNTER (EMERGENCY)
Facility: HOSPITAL | Age: 60
Discharge: HOME | End: 2025-05-23
Attending: INTERNAL MEDICINE

## 2025-05-23 VITALS
BODY MASS INDEX: 23.4 KG/M2 | DIASTOLIC BLOOD PRESSURE: 70 MMHG | HEIGHT: 74 IN | RESPIRATION RATE: 22 BRPM | OXYGEN SATURATION: 100 % | HEART RATE: 80 BPM | WEIGHT: 182.31 LBS | SYSTOLIC BLOOD PRESSURE: 130 MMHG | TEMPERATURE: 98 F

## 2025-05-23 DIAGNOSIS — R06.02 SHORTNESS OF BREATH: Primary | ICD-10-CM

## 2025-05-23 DIAGNOSIS — I50.9 CONGESTIVE HEART FAILURE, UNSPECIFIED HF CHRONICITY, UNSPECIFIED HEART FAILURE TYPE: ICD-10-CM

## 2025-05-23 LAB
ALBUMIN SERPL-MCNC: 4 G/DL (ref 3.5–5)
ALBUMIN/GLOB SERPL: 1.1 RATIO (ref 1.1–2)
ALP SERPL-CCNC: 147 UNIT/L (ref 40–150)
ALT SERPL-CCNC: 85 UNIT/L (ref 0–55)
ANION GAP SERPL CALC-SCNC: 9 MEQ/L
AST SERPL-CCNC: 40 UNIT/L (ref 11–45)
BASOPHILS # BLD AUTO: 0.08 X10(3)/MCL
BASOPHILS NFR BLD AUTO: 0.8 %
BILIRUB SERPL-MCNC: 0.6 MG/DL
BNP BLD-MCNC: 1086.4 PG/ML
BUN SERPL-MCNC: 33.3 MG/DL (ref 8.4–25.7)
CALCIUM SERPL-MCNC: 9.7 MG/DL (ref 8.4–10.2)
CHLORIDE SERPL-SCNC: 108 MMOL/L (ref 98–107)
CO2 SERPL-SCNC: 24 MMOL/L (ref 22–29)
CREAT SERPL-MCNC: 1.54 MG/DL (ref 0.72–1.25)
CREAT/UREA NIT SERPL: 22
EOSINOPHIL # BLD AUTO: 0.32 X10(3)/MCL (ref 0–0.9)
EOSINOPHIL NFR BLD AUTO: 3.4 %
ERYTHROCYTE [DISTWIDTH] IN BLOOD BY AUTOMATED COUNT: 13.1 % (ref 11.5–17)
GFR SERPLBLD CREATININE-BSD FMLA CKD-EPI: 52 ML/MIN/1.73/M2
GLOBULIN SER-MCNC: 3.7 GM/DL (ref 2.4–3.5)
GLUCOSE SERPL-MCNC: 66 MG/DL (ref 74–100)
HCT VFR BLD AUTO: 44.2 % (ref 42–52)
HGB BLD-MCNC: 15.1 G/DL (ref 14–18)
IMM GRANULOCYTES # BLD AUTO: 0.05 X10(3)/MCL (ref 0–0.04)
IMM GRANULOCYTES NFR BLD AUTO: 0.5 %
LYMPHOCYTES # BLD AUTO: 3.13 X10(3)/MCL (ref 0.6–4.6)
LYMPHOCYTES NFR BLD AUTO: 32.8 %
MCH RBC QN AUTO: 33.8 PG (ref 27–31)
MCHC RBC AUTO-ENTMCNC: 34.2 G/DL (ref 33–36)
MCV RBC AUTO: 98.9 FL (ref 80–94)
MONOCYTES # BLD AUTO: 0.7 X10(3)/MCL (ref 0.1–1.3)
MONOCYTES NFR BLD AUTO: 7.3 %
NEUTROPHILS # BLD AUTO: 5.27 X10(3)/MCL (ref 2.1–9.2)
NEUTROPHILS NFR BLD AUTO: 55.2 %
NRBC BLD AUTO-RTO: 0 %
PLATELET # BLD AUTO: 314 X10(3)/MCL (ref 130–400)
PMV BLD AUTO: 8.8 FL (ref 7.4–10.4)
POCT GLUCOSE: 100 MG/DL (ref 70–110)
POTASSIUM SERPL-SCNC: 4.4 MMOL/L (ref 3.5–5.1)
PROT SERPL-MCNC: 7.7 GM/DL (ref 6.4–8.3)
RBC # BLD AUTO: 4.47 X10(6)/MCL (ref 4.7–6.1)
SODIUM SERPL-SCNC: 141 MMOL/L (ref 136–145)
TROPONIN I SERPL-MCNC: 0.06 NG/ML (ref 0–0.04)
WBC # BLD AUTO: 9.55 X10(3)/MCL (ref 4.5–11.5)

## 2025-05-23 PROCEDURE — 93005 ELECTROCARDIOGRAM TRACING: CPT

## 2025-05-23 PROCEDURE — 82962 GLUCOSE BLOOD TEST: CPT

## 2025-05-23 PROCEDURE — 99285 EMERGENCY DEPT VISIT HI MDM: CPT | Mod: 25

## 2025-05-23 PROCEDURE — 80053 COMPREHEN METABOLIC PANEL: CPT

## 2025-05-23 PROCEDURE — 84484 ASSAY OF TROPONIN QUANT: CPT

## 2025-05-23 PROCEDURE — 85025 COMPLETE CBC W/AUTO DIFF WBC: CPT

## 2025-05-23 PROCEDURE — 25000003 PHARM REV CODE 250

## 2025-05-23 PROCEDURE — 83880 ASSAY OF NATRIURETIC PEPTIDE: CPT

## 2025-05-23 RX ORDER — FUROSEMIDE 20 MG/1
20 TABLET ORAL EVERY OTHER DAY
Qty: 5 TABLET | Refills: 0 | Status: SHIPPED | OUTPATIENT
Start: 2025-05-23 | End: 2025-06-02

## 2025-05-23 RX ORDER — FUROSEMIDE 20 MG/1
20 TABLET ORAL
Status: COMPLETED | OUTPATIENT
Start: 2025-05-23 | End: 2025-05-23

## 2025-05-23 RX ADMIN — FUROSEMIDE 20 MG: 20 TABLET ORAL at 10:05

## 2025-05-24 NOTE — ED PROVIDER NOTES
Encounter Date: 5/23/2025       History     Chief Complaint   Patient presents with    Shortness of Breath     States SOB and mild weakness starting 3 days ago.  States SOB worsening.      Weakness     59 M with history of HFrEF (EF 20% in 2022), HTN, Major depressive disorder, folic acid deficiency, Polysubstance abuse, and Tobacco use presenting with SOB and fatigue x  3 days. States was only SOB with exertion but is now increasing and occurring at rest. Denies any fever, chills, n/v/d, chest pain, palpitations, leg swelling, headache, abdominal pain, dysuria. He is unsure of all the medications he takes but states he was recently taken off of some of his meds. His girlfriend believes he is not taking his medications correctly but had physical medication bottles in her purse which included Toprol-XL 25mg daily, Wellbutrin- XL 150mg qd, folic acid 1 mg daily, and metformin 500 mg daily.     The history is provided by the patient, medical records and a relative.     Review of patient's allergies indicates:  No Known Allergies  Past Medical History:   Diagnosis Date    CHF (congestive heart failure)     Disruption of external operation (surgical) wound, not elsewhere classified, initial encounter 05/30/2022    Hypertension     Open abdominal wall wound 05/30/2022    Status post colostomy 05/30/2022    Tobacco user 05/26/2022    Transient ischemic attack 05/26/2022     Past Surgical History:   Procedure Laterality Date    COLOSTOMY      EXAMINATION UNDER ANESTHESIA N/A 5/18/2022    Procedure: Exam under anesthesia, removal of rectal foreign body;  Surgeon: Chauncey Jimenez Jr., MD;  Location: [facility];  Service: General;  Laterality: N/A;  David/Scheuermann, possible ex lap     No family history on file.  Social History[1]  Review of Systems   Constitutional:  Positive for fatigue. Negative for chills and fever.   HENT:  Negative for congestion and rhinorrhea.    Respiratory:  Positive for shortness of breath.  Negative for cough, chest tightness and wheezing.    Cardiovascular:  Negative for chest pain, palpitations and leg swelling.   Gastrointestinal:  Negative for abdominal distention, abdominal pain, diarrhea, nausea and vomiting.   Genitourinary:  Negative for dysuria and hematuria.   Musculoskeletal:  Negative for myalgias.   Skin: Negative.    Neurological:  Negative for syncope, light-headedness and headaches.       Physical Exam     Initial Vitals [05/23/25 1959]   BP Pulse Resp Temp SpO2   (!) 150/86 90 18 97.7 °F (36.5 °C) 100 %      MAP       --         Physical Exam    Constitutional: He is not diaphoretic. No distress.   HENT:   Head: Normocephalic.   Right Ear: External ear normal.   Left Ear: External ear normal.   Nose: Nose normal. Mouth/Throat: Oropharynx is clear and moist. No oropharyngeal exudate.   Eyes: Conjunctivae and EOM are normal.   Neck: Neck supple. No JVD present.   Normal range of motion.  Cardiovascular:  Normal rate, regular rhythm, normal heart sounds and intact distal pulses.           No murmur heard.  Pulmonary/Chest: Breath sounds normal. No respiratory distress. He has no wheezes. He has no rales.   Abdominal: Abdomen is soft. Bowel sounds are normal. He exhibits no distension. There is no abdominal tenderness.   Musculoskeletal:         General: Edema (trace) present. No tenderness. Normal range of motion.      Cervical back: Normal range of motion and neck supple.     Neurological: He is alert and oriented to person, place, and time. He has normal strength. No cranial nerve deficit. GCS score is 15. GCS eye subscore is 4. GCS verbal subscore is 5. GCS motor subscore is 6.   Skin: Skin is warm and dry. Capillary refill takes less than 2 seconds.   Psychiatric: He has a normal mood and affect.         ED Course   Procedures  Labs Reviewed   COMPREHENSIVE METABOLIC PANEL - Abnormal       Result Value    Sodium 141      Potassium 4.4      Chloride 108 (*)     CO2 24      Glucose 66  (*)     Blood Urea Nitrogen 33.3 (*)     Creatinine 1.54 (*)     Calcium 9.7      Protein Total 7.7      Albumin 4.0      Globulin 3.7 (*)     Albumin/Globulin Ratio 1.1      Bilirubin Total 0.6            ALT 85 (*)     AST 40      eGFR 52      Anion Gap 9.0      BUN/Creatinine Ratio 22     TROPONIN I - Abnormal    Troponin-I 0.056 (*)    B-TYPE NATRIURETIC PEPTIDE - Abnormal    Natriuretic Peptide 1,086.4 (*)    CBC WITH DIFFERENTIAL - Abnormal    WBC 9.55      RBC 4.47 (*)     Hgb 15.1      Hct 44.2      MCV 98.9 (*)     MCH 33.8 (*)     MCHC 34.2      RDW 13.1      Platelet 314      MPV 8.8      Neut % 55.2      Lymph % 32.8      Mono % 7.3      Eos % 3.4      Basophil % 0.8      Imm Grans % 0.5      Neut # 5.27      Lymph # 3.13      Mono # 0.70      Eos # 0.32      Baso # 0.08      Imm Gran # 0.05 (*)     NRBC% 0.0     CBC W/ AUTO DIFFERENTIAL    Narrative:     The following orders were created for panel order CBC auto differential.  Procedure                               Abnormality         Status                     ---------                               -----------         ------                     CBC with Differential[[id number]]       Abnormal            Final result                 Please view results for these tests on the individual orders.   POCT GLUCOSE, HAND-HELD DEVICE   POCT GLUCOSE    POCT Glucose 100            Imaging Results              X-Ray Chest PA And Lateral (Final result)  Result time 05/23/25 21:42:15      Final result by Austin Friend MD (05/23/25 21:42:15)                   Narrative:    EXAMINATION  XR CHEST PA AND LATERAL    CLINICAL HISTORY  Chest Pain;    TECHNIQUE  A total of 4 images submitted of the chest.    COMPARISON  14 December 2022    FINDINGS  Lines/tubes/devices: none present    There is similar enlarged of the cardiac silhouette and calcification of the aortic arch.  The trachea is midline.  Chronic bilateral lung parenchymal changes are present, similar  to the prior.  There is no lobar consolidation, pleural effusion, or pneumothorax.    There is no acute osseous or extrathoracic abnormality.    IMPRESSION  No convincing acute abnormality.      Electronically signed by: Austin Friend  Date:    05/23/2025  Time:    21:42                                     Medications   furosemide tablet 20 mg (20 mg Oral Given 5/23/25 2209)     Medical Decision Making  59 M with history of HFrEF (EF 20% in 2022), HTN, Major depressive disorder, folic acid deficiency, Polysubstance abuse, and Tobacco use presenting with SOB and fatigue x  3 days. Cardiac enzymes better than previous. CMP with elevated Cr but similar to baseline. CXR unremarkable. EKG unremarkable. Patient not fluid overloaded on exam. Presentation likely a mild exacerbation of HF. Prescription for PO Lasix 20mg every other day for the next 10 days sent. Referral to Cardiology and Internal Medicine sent. HDS for discharge.       Amount and/or Complexity of Data Reviewed  Labs: ordered. Decision-making details documented in ED Course.  Radiology: ordered and independent interpretation performed. Decision-making details documented in ED Course.  ECG/medicine tests: ordered and independent interpretation performed. Decision-making details documented in ED Course.    Risk  Prescription drug management.      Additional MDM:   Differential Diagnosis:   Other: The following diagnoses were also considered and will be evaluated: Sepsis, MI and Pnemonia.           Attending Attestation:   Physician Attestation Statement for Resident:  As the supervising MD   Physician Attestation Statement: I have personally seen and examined this patient.   I agree with the above history.  -:   As the supervising MD I agree with the above PE.     As the supervising MD I agree with the above treatment, course, plan, and disposition.    I have reviewed and agree with the residents interpretation of the following: lab data, x-rays and EKG.  I  have reviewed the following: old records at this facility.                                       Clinical Impression:  Final diagnoses:  [R06.02] Shortness of breath (Primary)  [I50.9] Congestive heart failure, unspecified HF chronicity, unspecified heart failure type          ED Disposition Condition    Discharge Stable          ED Prescriptions       Medication Sig Dispense Start Date End Date Auth. Provider    furosemide (LASIX) 20 MG tablet Take 1 tablet (20 mg total) by mouth every other day. for 10 days 5 tablet 5/23/2025 6/2/2025 Emilia Perrin MD          Follow-up Information       Follow up With Specialties Details Why Contact Info    [facility] Dept Emergency Medicine Go to  As needed, If symptoms worsen [address] [place] [id number] [phone]    [facility] Cardiology Cardiology Schedule an appointment as soon as possible for a visit in 1 week  [address] [place] [id number] [phone]    [facility] Internal Medicine Internal Medicine Schedule an appointment as soon as possible for a visit in 2 weeks  [address] [place] [id number] [phone]                 Emilia Perrin MD  Resident  05/23/25 1568         [1]   Social History  Tobacco Use    Smoking status: Some Days     Current packs/day: 0.50     Average packs/day: 0.5 packs/day for 9.0 years (4.5 ttl pk-yrs)     Types: Cigarettes     Passive exposure: Never    Smokeless tobacco: Never    Tobacco comments:     Smoked for 6 years then quit and started back smoking now for 3     years   Substance Use Topics    Alcohol use: Yes     Alcohol/week: 7.0 standard drinks of alcohol     Types: 6 Cans of beer, 1 Drinks containing 0.5 oz of alcohol per week     Comment: twice a week    Drug use: Not Currently     Types: Marijuana, Cocaine     Comment: last cocaine use x1 yr ago, smokes marijana weekly        Stu Rae,  MD  05/24/25 0447

## 2025-05-26 LAB
OHS QRS DURATION: 150 MS
OHS QTC CALCULATION: 511 MS

## 2025-06-25 ENCOUNTER — HOSPITAL ENCOUNTER (EMERGENCY)
Facility: HOSPITAL | Age: 60
Discharge: LEFT BEFORE DISCHARGE | End: 2025-06-25

## 2025-06-25 VITALS
OXYGEN SATURATION: 98 % | HEART RATE: 77 BPM | WEIGHT: 185 LBS | BODY MASS INDEX: 23.74 KG/M2 | HEIGHT: 74 IN | SYSTOLIC BLOOD PRESSURE: 142 MMHG | DIASTOLIC BLOOD PRESSURE: 79 MMHG | RESPIRATION RATE: 20 BRPM | TEMPERATURE: 98 F

## 2025-06-25 DIAGNOSIS — R06.02 SOB (SHORTNESS OF BREATH): ICD-10-CM

## 2025-06-25 PROCEDURE — 99900041 HC LEFT WITHOUT BEING SEEN- EMERGENCY

## 2025-06-25 PROCEDURE — 93005 ELECTROCARDIOGRAM TRACING: CPT

## 2025-06-26 LAB
OHS QRS DURATION: 164 MS
OHS QTC CALCULATION: 493 MS

## 2025-08-06 ENCOUNTER — HOSPITAL ENCOUNTER (INPATIENT)
Facility: HOSPITAL | Age: 60
LOS: 2 days | Discharge: HOME | DRG: 281 | End: 2025-08-08
Attending: EMERGENCY MEDICINE | Admitting: INTERNAL MEDICINE

## 2025-08-06 DIAGNOSIS — I50.9 ACUTE EXACERBATION OF CHF (CONGESTIVE HEART FAILURE): ICD-10-CM

## 2025-08-06 DIAGNOSIS — R06.02 SOB (SHORTNESS OF BREATH): ICD-10-CM

## 2025-08-06 DIAGNOSIS — E78.5 HYPERLIPIDEMIA, UNSPECIFIED HYPERLIPIDEMIA TYPE: ICD-10-CM

## 2025-08-06 DIAGNOSIS — R79.89 ELEVATED TROPONIN: ICD-10-CM

## 2025-08-06 DIAGNOSIS — I50.21 ACUTE SYSTOLIC CONGESTIVE HEART FAILURE: ICD-10-CM

## 2025-08-06 DIAGNOSIS — I50.9 ACUTE ON CHRONIC CONGESTIVE HEART FAILURE, UNSPECIFIED HEART FAILURE TYPE: Primary | ICD-10-CM

## 2025-08-06 DIAGNOSIS — J84.9 INTERSTITIAL PNEUMONIA OF BOTH LUNGS: ICD-10-CM

## 2025-08-06 DIAGNOSIS — I50.9 ACUTE CONGESTIVE HEART FAILURE, UNSPECIFIED HEART FAILURE TYPE: ICD-10-CM

## 2025-08-06 LAB
ALBUMIN SERPL-MCNC: 3.6 G/DL (ref 3.5–5)
ALBUMIN/GLOB SERPL: 1.2 RATIO (ref 1.1–2)
ALP SERPL-CCNC: 136 UNIT/L (ref 40–150)
ALT SERPL-CCNC: 148 UNIT/L (ref 0–55)
ANION GAP SERPL CALC-SCNC: 8 MEQ/L
AST SERPL-CCNC: 154 UNIT/L (ref 11–45)
BACTERIA #/AREA URNS AUTO: ABNORMAL /HPF
BASOPHILS # BLD AUTO: 0.05 X10(3)/MCL
BASOPHILS NFR BLD AUTO: 0.7 %
BILIRUB SERPL-MCNC: 0.6 MG/DL
BILIRUB UR QL STRIP.AUTO: NEGATIVE
BUN SERPL-MCNC: 24.2 MG/DL (ref 8.4–25.7)
CALCIUM SERPL-MCNC: 8.7 MG/DL (ref 8.4–10.2)
CHLORIDE SERPL-SCNC: 113 MMOL/L (ref 98–107)
CLARITY UR: CLEAR
CO2 SERPL-SCNC: 21 MMOL/L (ref 22–29)
COLOR UR AUTO: ABNORMAL
CREAT SERPL-MCNC: 1.36 MG/DL (ref 0.72–1.25)
CREAT/UREA NIT SERPL: 18
EOSINOPHIL # BLD AUTO: 0.23 X10(3)/MCL (ref 0–0.9)
EOSINOPHIL NFR BLD AUTO: 3.3 %
ERYTHROCYTE [DISTWIDTH] IN BLOOD BY AUTOMATED COUNT: 13.8 % (ref 11.5–17)
FLUAV AG UPPER RESP QL IA.RAPID: NOT DETECTED
FLUBV AG UPPER RESP QL IA.RAPID: NOT DETECTED
GFR SERPLBLD CREATININE-BSD FMLA CKD-EPI: 60 ML/MIN/1.73/M2
GLOBULIN SER-MCNC: 3.1 GM/DL (ref 2.4–3.5)
GLUCOSE SERPL-MCNC: 93 MG/DL (ref 74–100)
GLUCOSE UR QL STRIP: NORMAL
HAV IGM SERPL QL IA: NONREACTIVE
HBV CORE IGM SERPL QL IA: NONREACTIVE
HBV SURFACE AG SERPL QL IA: NONREACTIVE
HCT VFR BLD AUTO: 39.9 % (ref 42–52)
HCV AB SERPL QL IA: NONREACTIVE
HGB BLD-MCNC: 13.1 G/DL (ref 14–18)
HGB UR QL STRIP: ABNORMAL
HOLD SPECIMEN: NORMAL
HOLD SPECIMEN: NORMAL
HYALINE CASTS #/AREA URNS LPF: ABNORMAL /LPF
IMM GRANULOCYTES # BLD AUTO: 0.03 X10(3)/MCL (ref 0–0.04)
IMM GRANULOCYTES NFR BLD AUTO: 0.4 %
KETONES UR QL STRIP: NEGATIVE
LACTATE SERPL-SCNC: 1.6 MMOL/L (ref 0.5–2.2)
LEUKOCYTE ESTERASE UR QL STRIP: NEGATIVE
LYMPHOCYTES # BLD AUTO: 2.21 X10(3)/MCL (ref 0.6–4.6)
LYMPHOCYTES NFR BLD AUTO: 32 %
MAGNESIUM SERPL-MCNC: 2.2 MG/DL (ref 1.6–2.6)
MCH RBC QN AUTO: 32.6 PG (ref 27–31)
MCHC RBC AUTO-ENTMCNC: 32.8 G/DL (ref 33–36)
MCV RBC AUTO: 99.3 FL (ref 80–94)
MONOCYTES # BLD AUTO: 0.51 X10(3)/MCL (ref 0.1–1.3)
MONOCYTES NFR BLD AUTO: 7.4 %
MUCOUS THREADS URNS QL MICRO: ABNORMAL /LPF
NEUTROPHILS # BLD AUTO: 3.87 X10(3)/MCL (ref 2.1–9.2)
NEUTROPHILS NFR BLD AUTO: 56.2 %
NITRITE UR QL STRIP: NEGATIVE
NRBC BLD AUTO-RTO: 0 %
NT-PROBNP SERPL-MCNC: 2537 PG/ML
PH UR STRIP: 5.5 [PH]
PHOSPHATE SERPL-MCNC: 3.2 MG/DL (ref 2.3–4.7)
PLATELET # BLD AUTO: 266 X10(3)/MCL (ref 130–400)
PMV BLD AUTO: 9.1 FL (ref 7.4–10.4)
POTASSIUM SERPL-SCNC: 4.2 MMOL/L (ref 3.5–5.1)
PROT SERPL-MCNC: 6.7 GM/DL (ref 6.4–8.3)
PROT UR QL STRIP: NEGATIVE
RBC # BLD AUTO: 4.02 X10(6)/MCL (ref 4.7–6.1)
RBC #/AREA URNS AUTO: ABNORMAL /HPF
SARS-COV-2 RNA RESP QL NAA+PROBE: NOT DETECTED
SODIUM SERPL-SCNC: 142 MMOL/L (ref 136–145)
SP GR UR STRIP.AUTO: 1.01 (ref 1–1.03)
SQUAMOUS #/AREA URNS LPF: ABNORMAL /HPF
TROPONIN I SERPL HS-MCNC: 62 NG/L
TROPONIN I SERPL HS-MCNC: 63 NG/L
UROBILINOGEN UR STRIP-ACNC: NORMAL
WBC # BLD AUTO: 6.9 X10(3)/MCL (ref 4.5–11.5)
WBC #/AREA URNS AUTO: ABNORMAL /HPF

## 2025-08-06 PROCEDURE — 96374 THER/PROPH/DIAG INJ IV PUSH: CPT

## 2025-08-06 PROCEDURE — 84484 ASSAY OF TROPONIN QUANT: CPT | Performed by: NURSE PRACTITIONER

## 2025-08-06 PROCEDURE — 83735 ASSAY OF MAGNESIUM: CPT

## 2025-08-06 PROCEDURE — 99285 EMERGENCY DEPT VISIT HI MDM: CPT | Mod: 25

## 2025-08-06 PROCEDURE — 87636 SARSCOV2 & INF A&B AMP PRB: CPT | Performed by: NURSE PRACTITIONER

## 2025-08-06 PROCEDURE — 63600175 PHARM REV CODE 636 W HCPCS

## 2025-08-06 PROCEDURE — 81001 URINALYSIS AUTO W/SCOPE: CPT

## 2025-08-06 PROCEDURE — 85025 COMPLETE CBC W/AUTO DIFF WBC: CPT | Performed by: NURSE PRACTITIONER

## 2025-08-06 PROCEDURE — 21400001 HC TELEMETRY ROOM

## 2025-08-06 PROCEDURE — 93005 ELECTROCARDIOGRAM TRACING: CPT

## 2025-08-06 PROCEDURE — 87040 BLOOD CULTURE FOR BACTERIA: CPT | Performed by: NURSE PRACTITIONER

## 2025-08-06 PROCEDURE — 25000003 PHARM REV CODE 250

## 2025-08-06 PROCEDURE — 84100 ASSAY OF PHOSPHORUS: CPT

## 2025-08-06 PROCEDURE — 83605 ASSAY OF LACTIC ACID: CPT | Performed by: NURSE PRACTITIONER

## 2025-08-06 PROCEDURE — 63600175 PHARM REV CODE 636 W HCPCS: Performed by: NURSE PRACTITIONER

## 2025-08-06 PROCEDURE — 80053 COMPREHEN METABOLIC PANEL: CPT | Performed by: NURSE PRACTITIONER

## 2025-08-06 PROCEDURE — 80074 ACUTE HEPATITIS PANEL: CPT | Performed by: NURSE PRACTITIONER

## 2025-08-06 PROCEDURE — 84484 ASSAY OF TROPONIN QUANT: CPT

## 2025-08-06 PROCEDURE — 83880 ASSAY OF NATRIURETIC PEPTIDE: CPT | Performed by: NURSE PRACTITIONER

## 2025-08-06 RX ORDER — SACUBITRIL AND VALSARTAN 24; 26 MG/1; MG/1
1 TABLET ORAL 2 TIMES DAILY
Status: DISCONTINUED | OUTPATIENT
Start: 2025-08-06 | End: 2025-08-08 | Stop reason: HOSPADM

## 2025-08-06 RX ORDER — FOLIC ACID 1 MG/1
1 TABLET ORAL DAILY
Status: DISCONTINUED | OUTPATIENT
Start: 2025-08-07 | End: 2025-08-08 | Stop reason: HOSPADM

## 2025-08-06 RX ORDER — ONDANSETRON HYDROCHLORIDE 2 MG/ML
4 INJECTION, SOLUTION INTRAVENOUS EVERY 8 HOURS PRN
Status: DISCONTINUED | OUTPATIENT
Start: 2025-08-06 | End: 2025-08-08 | Stop reason: HOSPADM

## 2025-08-06 RX ORDER — ATORVASTATIN CALCIUM 20 MG/1
20 TABLET, FILM COATED ORAL DAILY
Status: DISCONTINUED | OUTPATIENT
Start: 2025-08-07 | End: 2025-08-08 | Stop reason: HOSPADM

## 2025-08-06 RX ORDER — IBUPROFEN 200 MG
24 TABLET ORAL
Status: DISCONTINUED | OUTPATIENT
Start: 2025-08-06 | End: 2025-08-08 | Stop reason: HOSPADM

## 2025-08-06 RX ORDER — NAPROXEN SODIUM 220 MG/1
81 TABLET, FILM COATED ORAL DAILY
Status: DISCONTINUED | OUTPATIENT
Start: 2025-08-07 | End: 2025-08-07

## 2025-08-06 RX ORDER — FUROSEMIDE 10 MG/ML
40 INJECTION INTRAMUSCULAR; INTRAVENOUS
Status: COMPLETED | OUTPATIENT
Start: 2025-08-06 | End: 2025-08-06

## 2025-08-06 RX ORDER — TALC
6 POWDER (GRAM) TOPICAL NIGHTLY PRN
Status: DISCONTINUED | OUTPATIENT
Start: 2025-08-06 | End: 2025-08-08 | Stop reason: HOSPADM

## 2025-08-06 RX ORDER — GLUCAGON 1 MG
1 KIT INJECTION
Status: DISCONTINUED | OUTPATIENT
Start: 2025-08-06 | End: 2025-08-08 | Stop reason: HOSPADM

## 2025-08-06 RX ORDER — ENOXAPARIN SODIUM 100 MG/ML
90 INJECTION SUBCUTANEOUS EVERY 12 HOURS
Status: DISCONTINUED | OUTPATIENT
Start: 2025-08-06 | End: 2025-08-07

## 2025-08-06 RX ORDER — NALOXONE HCL 0.4 MG/ML
0.02 VIAL (ML) INJECTION
Status: DISCONTINUED | OUTPATIENT
Start: 2025-08-06 | End: 2025-08-08 | Stop reason: HOSPADM

## 2025-08-06 RX ORDER — IBUPROFEN 200 MG
16 TABLET ORAL
Status: DISCONTINUED | OUTPATIENT
Start: 2025-08-06 | End: 2025-08-08 | Stop reason: HOSPADM

## 2025-08-06 RX ORDER — PROCHLORPERAZINE EDISYLATE 5 MG/ML
5 INJECTION INTRAMUSCULAR; INTRAVENOUS EVERY 6 HOURS PRN
Status: DISCONTINUED | OUTPATIENT
Start: 2025-08-06 | End: 2025-08-08 | Stop reason: HOSPADM

## 2025-08-06 RX ORDER — ASPIRIN 325 MG
325 TABLET ORAL ONCE
Status: COMPLETED | OUTPATIENT
Start: 2025-08-06 | End: 2025-08-06

## 2025-08-06 RX ORDER — SODIUM CHLORIDE 0.9 % (FLUSH) 0.9 %
10 SYRINGE (ML) INJECTION EVERY 12 HOURS PRN
Status: DISCONTINUED | OUTPATIENT
Start: 2025-08-06 | End: 2025-08-08 | Stop reason: HOSPADM

## 2025-08-06 RX ORDER — ENOXAPARIN SODIUM 100 MG/ML
40 INJECTION SUBCUTANEOUS EVERY 24 HOURS
Status: DISCONTINUED | OUTPATIENT
Start: 2025-08-06 | End: 2025-08-06

## 2025-08-06 RX ORDER — ENOXAPARIN SODIUM 100 MG/ML
90 INJECTION SUBCUTANEOUS EVERY 24 HOURS
Status: DISCONTINUED | OUTPATIENT
Start: 2025-08-06 | End: 2025-08-06

## 2025-08-06 RX ORDER — METOPROLOL SUCCINATE 25 MG/1
25 TABLET, EXTENDED RELEASE ORAL DAILY
Status: DISCONTINUED | OUTPATIENT
Start: 2025-08-07 | End: 2025-08-08

## 2025-08-06 RX ORDER — AMLODIPINE BESYLATE 5 MG/1
5 TABLET ORAL DAILY
Status: DISCONTINUED | OUTPATIENT
Start: 2025-08-07 | End: 2025-08-08

## 2025-08-06 RX ADMIN — SACUBITRIL AND VALSARTAN 1 TABLET: 24; 26 TABLET, FILM COATED ORAL at 08:08

## 2025-08-06 RX ADMIN — FUROSEMIDE 40 MG: 10 INJECTION, SOLUTION INTRAVENOUS at 07:08

## 2025-08-06 RX ADMIN — ASPIRIN 325 MG ORAL TABLET 325 MG: 325 PILL ORAL at 08:08

## 2025-08-06 RX ADMIN — ENOXAPARIN SODIUM 90 MG: 100 INJECTION SUBCUTANEOUS at 08:08

## 2025-08-07 PROBLEM — E44.0 MODERATE MALNUTRITION: Status: ACTIVE | Noted: 2025-08-07

## 2025-08-07 LAB
ALBUMIN SERPL-MCNC: 3.5 G/DL (ref 3.5–5)
ALBUMIN/GLOB SERPL: 1 RATIO (ref 1.1–2)
ALP SERPL-CCNC: 130 UNIT/L (ref 40–150)
ALT SERPL-CCNC: 122 UNIT/L (ref 0–55)
ANION GAP SERPL CALC-SCNC: 9 MEQ/L
APICAL FOUR CHAMBER EJECTION FRACTION: 28 %
APICAL TWO CHAMBER EJECTION FRACTION: 25 %
AST SERPL-CCNC: 73 UNIT/L (ref 11–45)
AV INDEX (PROSTH): 0.99
AV MEAN GRADIENT: 2 MMHG
AV PEAK GRADIENT: 3 MMHG
AV VALVE AREA BY VELOCITY RATIO: 3.7 CM²
AV VALVE AREA: 4.1 CM²
AV VELOCITY RATIO: 0.89
BASOPHILS # BLD AUTO: 0.09 X10(3)/MCL
BASOPHILS NFR BLD AUTO: 1.1 %
BILIRUB SERPL-MCNC: 0.8 MG/DL
BSA FOR ECHO PROCEDURE: 2.1 M2
BUN SERPL-MCNC: 22.3 MG/DL (ref 8.4–25.7)
CALCIUM SERPL-MCNC: 8.5 MG/DL (ref 8.4–10.2)
CHLORIDE SERPL-SCNC: 107 MMOL/L (ref 98–107)
CO2 SERPL-SCNC: 24 MMOL/L (ref 22–29)
CREAT SERPL-MCNC: 1.3 MG/DL (ref 0.72–1.25)
CREAT/UREA NIT SERPL: 17
CV ECHO LV RWT: 0.38 CM
DOP CALC AO PEAK VEL: 0.9 M/S
DOP CALC AO VTI: 14.3 CM
DOP CALC LVOT AREA: 4.2 CM2
DOP CALC LVOT DIAMETER: 2.3 CM
DOP CALC LVOT PEAK VEL: 0.8 M/S
DOP CALC MV VTI: 35.8 CM
DOP CALCLVOT PEAK VEL VTI: 14.2 CM
E WAVE DECELERATION TIME: 262 MSEC
E/A RATIO: 2.7
E/E' RATIO: 41 M/S
ECHO LV POSTERIOR WALL: 1 CM (ref 0.6–1.1)
EOSINOPHIL # BLD AUTO: 0.37 X10(3)/MCL (ref 0–0.9)
EOSINOPHIL NFR BLD AUTO: 4.4 %
ERYTHROCYTE [DISTWIDTH] IN BLOOD BY AUTOMATED COUNT: 13.7 % (ref 11.5–17)
FRACTIONAL SHORTENING: 13.2 % (ref 28–44)
GFR SERPLBLD CREATININE-BSD FMLA CKD-EPI: >60 ML/MIN/1.73/M2
GLOBULIN SER-MCNC: 3.4 GM/DL (ref 2.4–3.5)
GLUCOSE SERPL-MCNC: 110 MG/DL (ref 74–100)
HCT VFR BLD AUTO: 42.4 % (ref 42–52)
HGB BLD-MCNC: 14.2 G/DL (ref 14–18)
IMM GRANULOCYTES # BLD AUTO: 0.04 X10(3)/MCL (ref 0–0.04)
IMM GRANULOCYTES NFR BLD AUTO: 0.5 %
INTERVENTRICULAR SEPTUM: 1.3 CM (ref 0.6–1.1)
LEFT ATRIUM SIZE: 5.5 CM
LEFT INTERNAL DIMENSION IN SYSTOLE: 4.6 CM (ref 2.1–4)
LEFT VENTRICLE DIASTOLIC VOLUME INDEX: 69.23 ML/M2
LEFT VENTRICLE DIASTOLIC VOLUME: 135 ML
LEFT VENTRICLE END DIASTOLIC VOLUME APICAL 2 CHAMBER: 245.8 ML
LEFT VENTRICLE END DIASTOLIC VOLUME APICAL 4 CHAMBER INDEX BSA: 95.61 ML/M2
LEFT VENTRICLE END DIASTOLIC VOLUME APICAL 4 CHAMBER: 186.44 ML
LEFT VENTRICLE MASS INDEX: 124.1 G/M2
LEFT VENTRICLE SYSTOLIC VOLUME INDEX: 50.3 ML/M2
LEFT VENTRICLE SYSTOLIC VOLUME: 98 ML
LEFT VENTRICULAR INTERNAL DIMENSION IN DIASTOLE: 5.3 CM (ref 3.5–6)
LEFT VENTRICULAR MASS: 242 G
LV LATERAL E/E' RATIO: 33 M/S
LV SEPTAL E/E' RATIO: 55 M/S
LVED V (TEICH): 134.99 ML
LVES V (TEICH): 97.72 ML
LVOT MG: 1.26 MMHG
LVOT MV: 0.53 CM/S
LYMPHOCYTES # BLD AUTO: 2.13 X10(3)/MCL (ref 0.6–4.6)
LYMPHOCYTES NFR BLD AUTO: 25.1 %
MAGNESIUM SERPL-MCNC: 2 MG/DL (ref 1.6–2.6)
MCH RBC QN AUTO: 32.9 PG (ref 27–31)
MCHC RBC AUTO-ENTMCNC: 33.5 G/DL (ref 33–36)
MCV RBC AUTO: 98.1 FL (ref 80–94)
MONOCYTES # BLD AUTO: 0.74 X10(3)/MCL (ref 0.1–1.3)
MONOCYTES NFR BLD AUTO: 8.7 %
MV MEAN GRADIENT: 2 MMHG
MV PEAK A VEL: 0.61 M/S
MV PEAK E VEL: 1.65 M/S
MV PEAK GRADIENT: 8 MMHG
MV STENOSIS PRESSURE HALF TIME: 75.96 MS
MV VALVE AREA BY CONTINUITY EQUATION: 1.65 CM2
MV VALVE AREA P 1/2 METHOD: 2.9 CM2
NEUTROPHILS # BLD AUTO: 5.1 X10(3)/MCL (ref 2.1–9.2)
NEUTROPHILS NFR BLD AUTO: 60.2 %
NRBC BLD AUTO-RTO: 0 %
OHS CV CPX PATIENT HEIGHT IN: 74
OHS CV RV/LV RATIO: 0.66 CM
OHS LV EJECTION FRACTION SIMPSONS BIPLANE MOD: 28 %
OHS QRS DURATION: 130 MS
OHS QTC CALCULATION: 498 MS
PHOSPHATE SERPL-MCNC: 3.2 MG/DL (ref 2.3–4.7)
PISA TR MAX VEL: 3 M/S
PLATELET # BLD AUTO: 273 X10(3)/MCL (ref 130–400)
PMV BLD AUTO: 9.4 FL (ref 7.4–10.4)
POTASSIUM SERPL-SCNC: 3.7 MMOL/L (ref 3.5–5.1)
PROT SERPL-MCNC: 6.9 GM/DL (ref 6.4–8.3)
RA PRESSURE ESTIMATED: 15 MMHG
RBC # BLD AUTO: 4.32 X10(6)/MCL (ref 4.7–6.1)
RIGHT VENTRICLE DIASTOLIC BASEL DIMENSION: 3.5 CM
RIGHT VENTRICULAR END-DIASTOLIC DIMENSION: 3.5 CM
RV TB RVSP: 18 MMHG
SODIUM SERPL-SCNC: 140 MMOL/L (ref 136–145)
TDI LATERAL: 0.05 M/S
TDI SEPTAL: 0.03 M/S
TDI: 0.04 M/S
TR MAX PG: 36 MMHG
TRICUSPID ANNULAR PLANE SYSTOLIC EXCURSION: 1.9 CM
TV REST PULMONARY ARTERY PRESSURE: 51 MMHG
WBC # BLD AUTO: 8.47 X10(3)/MCL (ref 4.5–11.5)
Z-SCORE OF LEFT VENTRICULAR DIMENSION IN END DIASTOLE: -0.48
Z-SCORE OF LEFT VENTRICULAR DIMENSION IN END SYSTOLE: 2.34

## 2025-08-07 PROCEDURE — 63600175 PHARM REV CODE 636 W HCPCS

## 2025-08-07 PROCEDURE — 85025 COMPLETE CBC W/AUTO DIFF WBC: CPT

## 2025-08-07 PROCEDURE — 63600175 PHARM REV CODE 636 W HCPCS: Performed by: INTERNAL MEDICINE

## 2025-08-07 PROCEDURE — 25000003 PHARM REV CODE 250

## 2025-08-07 PROCEDURE — 21400001 HC TELEMETRY ROOM

## 2025-08-07 PROCEDURE — 36415 COLL VENOUS BLD VENIPUNCTURE: CPT

## 2025-08-07 PROCEDURE — 80053 COMPREHEN METABOLIC PANEL: CPT

## 2025-08-07 PROCEDURE — 83735 ASSAY OF MAGNESIUM: CPT

## 2025-08-07 PROCEDURE — 84100 ASSAY OF PHOSPHORUS: CPT

## 2025-08-07 RX ORDER — FUROSEMIDE 10 MG/ML
40 INJECTION INTRAMUSCULAR; INTRAVENOUS ONCE
Status: COMPLETED | OUTPATIENT
Start: 2025-08-07 | End: 2025-08-07

## 2025-08-07 RX ORDER — FUROSEMIDE 10 MG/ML
40 INJECTION INTRAMUSCULAR; INTRAVENOUS ONCE
Status: DISCONTINUED | OUTPATIENT
Start: 2025-08-07 | End: 2025-08-08

## 2025-08-07 RX ORDER — INSULIN ASPART 100 [IU]/ML
0-5 INJECTION, SOLUTION INTRAVENOUS; SUBCUTANEOUS
Status: DISCONTINUED | OUTPATIENT
Start: 2025-08-07 | End: 2025-08-08 | Stop reason: HOSPADM

## 2025-08-07 RX ORDER — POTASSIUM CHLORIDE 750 MG/1
30 CAPSULE, EXTENDED RELEASE ORAL ONCE
Status: COMPLETED | OUTPATIENT
Start: 2025-08-07 | End: 2025-08-07

## 2025-08-07 RX ORDER — ENOXAPARIN SODIUM 100 MG/ML
40 INJECTION SUBCUTANEOUS EVERY 24 HOURS
Status: DISCONTINUED | OUTPATIENT
Start: 2025-08-07 | End: 2025-08-08 | Stop reason: HOSPADM

## 2025-08-07 RX ORDER — NAPROXEN SODIUM 220 MG/1
81 TABLET, FILM COATED ORAL DAILY
Status: DISCONTINUED | OUTPATIENT
Start: 2025-08-08 | End: 2025-08-08 | Stop reason: HOSPADM

## 2025-08-07 RX ADMIN — AMLODIPINE BESYLATE 5 MG: 5 TABLET ORAL at 08:08

## 2025-08-07 RX ADMIN — Medication 6 MG: at 09:08

## 2025-08-07 RX ADMIN — ASPIRIN 81 MG CHEWABLE TABLET 81 MG: 81 TABLET CHEWABLE at 08:08

## 2025-08-07 RX ADMIN — FOLIC ACID 1 MG: 1 TABLET ORAL at 08:08

## 2025-08-07 RX ADMIN — SACUBITRIL AND VALSARTAN 1 TABLET: 24; 26 TABLET, FILM COATED ORAL at 09:08

## 2025-08-07 RX ADMIN — SACUBITRIL AND VALSARTAN 1 TABLET: 24; 26 TABLET, FILM COATED ORAL at 08:08

## 2025-08-07 RX ADMIN — POTASSIUM CHLORIDE 30 MEQ: 750 CAPSULE, EXTENDED RELEASE ORAL at 08:08

## 2025-08-07 RX ADMIN — METOPROLOL SUCCINATE 25 MG: 25 TABLET, EXTENDED RELEASE ORAL at 08:08

## 2025-08-07 RX ADMIN — FUROSEMIDE 40 MG: 10 INJECTION, SOLUTION INTRAMUSCULAR; INTRAVENOUS at 11:08

## 2025-08-07 RX ADMIN — Medication 6 MG: at 12:08

## 2025-08-07 RX ADMIN — ATORVASTATIN CALCIUM 20 MG: 20 TABLET, FILM COATED ORAL at 08:08

## 2025-08-07 RX ADMIN — ENOXAPARIN SODIUM 90 MG: 100 INJECTION SUBCUTANEOUS at 08:08

## 2025-08-07 RX ADMIN — EMPAGLIFLOZIN 10 MG: 10 TABLET, FILM COATED ORAL at 08:08

## 2025-08-08 VITALS
WEIGHT: 147.69 LBS | HEART RATE: 69 BPM | TEMPERATURE: 98 F | HEIGHT: 74 IN | BODY MASS INDEX: 18.95 KG/M2 | SYSTOLIC BLOOD PRESSURE: 117 MMHG | OXYGEN SATURATION: 91 % | DIASTOLIC BLOOD PRESSURE: 74 MMHG | RESPIRATION RATE: 18 BRPM

## 2025-08-08 LAB
ALBUMIN SERPL-MCNC: 3.6 G/DL (ref 3.5–5)
ALBUMIN/GLOB SERPL: 1 RATIO (ref 1.1–2)
ALP SERPL-CCNC: 130 UNIT/L (ref 40–150)
ALT SERPL-CCNC: 83 UNIT/L (ref 0–55)
ANION GAP SERPL CALC-SCNC: 12 MEQ/L
AST SERPL-CCNC: 29 UNIT/L (ref 11–45)
BASOPHILS # BLD AUTO: 0.08 X10(3)/MCL
BASOPHILS NFR BLD AUTO: 0.9 %
BILIRUB SERPL-MCNC: 1.1 MG/DL
BUN SERPL-MCNC: 25.4 MG/DL (ref 8.4–25.7)
CALCIUM SERPL-MCNC: 9 MG/DL (ref 8.4–10.2)
CHLORIDE SERPL-SCNC: 105 MMOL/L (ref 98–107)
CO2 SERPL-SCNC: 20 MMOL/L (ref 22–29)
CREAT SERPL-MCNC: 1.22 MG/DL (ref 0.72–1.25)
CREAT/UREA NIT SERPL: 21
EOSINOPHIL # BLD AUTO: 0.24 X10(3)/MCL (ref 0–0.9)
EOSINOPHIL NFR BLD AUTO: 2.7 %
ERYTHROCYTE [DISTWIDTH] IN BLOOD BY AUTOMATED COUNT: 13.3 % (ref 11.5–17)
GFR SERPLBLD CREATININE-BSD FMLA CKD-EPI: >60 ML/MIN/1.73/M2
GLOBULIN SER-MCNC: 3.6 GM/DL (ref 2.4–3.5)
GLUCOSE SERPL-MCNC: 99 MG/DL (ref 74–100)
HCT VFR BLD AUTO: 47.9 % (ref 42–52)
HGB BLD-MCNC: 16.4 G/DL (ref 14–18)
IMM GRANULOCYTES # BLD AUTO: 0.02 X10(3)/MCL (ref 0–0.04)
IMM GRANULOCYTES NFR BLD AUTO: 0.2 %
LYMPHOCYTES # BLD AUTO: 2.56 X10(3)/MCL (ref 0.6–4.6)
LYMPHOCYTES NFR BLD AUTO: 29 %
MAGNESIUM SERPL-MCNC: 1.9 MG/DL (ref 1.6–2.6)
MCH RBC QN AUTO: 32.9 PG (ref 27–31)
MCHC RBC AUTO-ENTMCNC: 34.2 G/DL (ref 33–36)
MCV RBC AUTO: 96.2 FL (ref 80–94)
MONOCYTES # BLD AUTO: 0.81 X10(3)/MCL (ref 0.1–1.3)
MONOCYTES NFR BLD AUTO: 9.2 %
NEUTROPHILS # BLD AUTO: 5.13 X10(3)/MCL (ref 2.1–9.2)
NEUTROPHILS NFR BLD AUTO: 58 %
NRBC BLD AUTO-RTO: 0 %
PHOSPHATE SERPL-MCNC: 3.4 MG/DL (ref 2.3–4.7)
PLATELET # BLD AUTO: 250 X10(3)/MCL (ref 130–400)
PMV BLD AUTO: 9.7 FL (ref 7.4–10.4)
POCT GLUCOSE: 158 MG/DL (ref 70–110)
POCT GLUCOSE: 53 MG/DL (ref 70–110)
POTASSIUM SERPL-SCNC: 4 MMOL/L (ref 3.5–5.1)
PROT SERPL-MCNC: 7.2 GM/DL (ref 6.4–8.3)
RBC # BLD AUTO: 4.98 X10(6)/MCL (ref 4.7–6.1)
SODIUM SERPL-SCNC: 137 MMOL/L (ref 136–145)
WBC # BLD AUTO: 8.84 X10(3)/MCL (ref 4.5–11.5)

## 2025-08-08 PROCEDURE — 97161 PT EVAL LOW COMPLEX 20 MIN: CPT

## 2025-08-08 PROCEDURE — 85025 COMPLETE CBC W/AUTO DIFF WBC: CPT

## 2025-08-08 PROCEDURE — 36415 COLL VENOUS BLD VENIPUNCTURE: CPT

## 2025-08-08 PROCEDURE — 63600175 PHARM REV CODE 636 W HCPCS

## 2025-08-08 PROCEDURE — 84100 ASSAY OF PHOSPHORUS: CPT

## 2025-08-08 PROCEDURE — 97116 GAIT TRAINING THERAPY: CPT

## 2025-08-08 PROCEDURE — 83735 ASSAY OF MAGNESIUM: CPT

## 2025-08-08 PROCEDURE — 25000003 PHARM REV CODE 250

## 2025-08-08 PROCEDURE — 80053 COMPREHEN METABOLIC PANEL: CPT

## 2025-08-08 RX ORDER — LISINOPRIL 5 MG/1
5 TABLET ORAL DAILY
Qty: 90 TABLET | Refills: 0 | Status: SHIPPED | OUTPATIENT
Start: 2025-08-08 | End: 2025-11-06

## 2025-08-08 RX ORDER — SACUBITRIL AND VALSARTAN 24; 26 MG/1; MG/1
1 TABLET ORAL 2 TIMES DAILY
Qty: 180 TABLET | Refills: 0 | Status: SHIPPED | OUTPATIENT
Start: 2025-08-08 | End: 2025-08-08 | Stop reason: HOSPADM

## 2025-08-08 RX ORDER — SPIRONOLACTONE 25 MG/1
25 TABLET ORAL DAILY
Qty: 90 TABLET | Refills: 0 | Status: SHIPPED | OUTPATIENT
Start: 2025-08-08 | End: 2025-11-06

## 2025-08-08 RX ORDER — SACUBITRIL AND VALSARTAN 24; 26 MG/1; MG/1
1 TABLET ORAL 2 TIMES DAILY
Qty: 180 TABLET | Refills: 0 | Status: SHIPPED | OUTPATIENT
Start: 2025-08-08 | End: 2025-08-08

## 2025-08-08 RX ORDER — METOPROLOL SUCCINATE 25 MG/1
25 TABLET, EXTENDED RELEASE ORAL DAILY
Qty: 90 TABLET | Refills: 0 | Status: SHIPPED | OUTPATIENT
Start: 2025-08-08 | End: 2025-08-08 | Stop reason: HOSPADM

## 2025-08-08 RX ORDER — METOPROLOL SUCCINATE 25 MG/1
12.5 TABLET, EXTENDED RELEASE ORAL DAILY
Qty: 45 TABLET | Refills: 3 | Status: SHIPPED | OUTPATIENT
Start: 2025-08-08 | End: 2026-08-08

## 2025-08-08 RX ORDER — FUROSEMIDE 10 MG/ML
40 INJECTION INTRAMUSCULAR; INTRAVENOUS ONCE
Status: COMPLETED | OUTPATIENT
Start: 2025-08-08 | End: 2025-08-08

## 2025-08-08 RX ORDER — FUROSEMIDE 20 MG/1
40 TABLET ORAL DAILY
Qty: 180 TABLET | Refills: 0 | Status: SHIPPED | OUTPATIENT
Start: 2025-08-08 | End: 2025-08-11 | Stop reason: SDUPTHER

## 2025-08-08 RX ORDER — SPIRONOLACTONE 25 MG/1
25 TABLET ORAL DAILY
Status: DISCONTINUED | OUTPATIENT
Start: 2025-08-08 | End: 2025-08-08 | Stop reason: HOSPADM

## 2025-08-08 RX ORDER — SPIRONOLACTONE 25 MG/1
25 TABLET ORAL DAILY
Qty: 90 TABLET | Refills: 0 | Status: SHIPPED | OUTPATIENT
Start: 2025-08-08 | End: 2025-08-08

## 2025-08-08 RX ORDER — ROSUVASTATIN CALCIUM 5 MG/1
5 TABLET, COATED ORAL DAILY
Qty: 90 TABLET | Refills: 0 | Status: SHIPPED | OUTPATIENT
Start: 2025-08-08 | End: 2025-08-11 | Stop reason: SDUPTHER

## 2025-08-08 RX ADMIN — EMPAGLIFLOZIN 10 MG: 10 TABLET, FILM COATED ORAL at 08:08

## 2025-08-08 RX ADMIN — FUROSEMIDE 40 MG: 10 INJECTION, SOLUTION INTRAMUSCULAR; INTRAVENOUS at 08:08

## 2025-08-08 RX ADMIN — METOPROLOL SUCCINATE 12.5 MG: 25 TABLET, EXTENDED RELEASE ORAL at 08:08

## 2025-08-08 RX ADMIN — ATORVASTATIN CALCIUM 20 MG: 20 TABLET, FILM COATED ORAL at 08:08

## 2025-08-08 RX ADMIN — ASPIRIN 81 MG CHEWABLE TABLET 81 MG: 81 TABLET CHEWABLE at 08:08

## 2025-08-08 RX ADMIN — FOLIC ACID 1 MG: 1 TABLET ORAL at 08:08

## 2025-08-08 RX ADMIN — SPIRONOLACTONE 25 MG: 25 TABLET, FILM COATED ORAL at 08:08

## 2025-08-08 RX ADMIN — SACUBITRIL AND VALSARTAN 1 TABLET: 24; 26 TABLET, FILM COATED ORAL at 08:08

## 2025-08-11 ENCOUNTER — OFFICE VISIT (OUTPATIENT)
Dept: INTERNAL MEDICINE | Facility: CLINIC | Age: 60
End: 2025-08-11

## 2025-08-11 VITALS
HEART RATE: 79 BPM | OXYGEN SATURATION: 98 % | RESPIRATION RATE: 20 BRPM | SYSTOLIC BLOOD PRESSURE: 132 MMHG | TEMPERATURE: 98 F | WEIGHT: 183 LBS | BODY MASS INDEX: 23.5 KG/M2 | DIASTOLIC BLOOD PRESSURE: 79 MMHG

## 2025-08-11 DIAGNOSIS — E78.5 HYPERLIPIDEMIA, UNSPECIFIED HYPERLIPIDEMIA TYPE: ICD-10-CM

## 2025-08-11 DIAGNOSIS — I50.9 ACUTE ON CHRONIC CONGESTIVE HEART FAILURE, UNSPECIFIED HEART FAILURE TYPE: ICD-10-CM

## 2025-08-11 DIAGNOSIS — I50.9 ACUTE CONGESTIVE HEART FAILURE, UNSPECIFIED HEART FAILURE TYPE: ICD-10-CM

## 2025-08-11 PROCEDURE — 99214 OFFICE O/P EST MOD 30 MIN: CPT | Mod: PBBFAC

## 2025-08-11 RX ORDER — ROSUVASTATIN CALCIUM 5 MG/1
5 TABLET, COATED ORAL DAILY
Qty: 30 TABLET | Refills: 0 | Status: SHIPPED | OUTPATIENT
Start: 2025-08-11 | End: 2025-11-09

## 2025-08-11 RX ORDER — FUROSEMIDE 20 MG/1
40 TABLET ORAL DAILY
Qty: 60 TABLET | Refills: 0 | Status: SHIPPED | OUTPATIENT
Start: 2025-08-11 | End: 2025-11-09

## 2025-08-11 RX ORDER — FUROSEMIDE 20 MG/1
40 TABLET ORAL DAILY
Qty: 60 TABLET | Refills: 0 | Status: SHIPPED | OUTPATIENT
Start: 2025-08-11 | End: 2025-08-11

## 2025-08-12 LAB
BACTERIA BLD CULT: NORMAL
BACTERIA BLD CULT: NORMAL

## 2025-08-14 ENCOUNTER — HOSPITAL ENCOUNTER (OUTPATIENT)
Dept: RADIOLOGY | Facility: HOSPITAL | Age: 60
Discharge: HOME | End: 2025-08-14
Attending: NURSE PRACTITIONER

## 2025-08-14 ENCOUNTER — OFFICE VISIT (OUTPATIENT)
Dept: FAMILY MEDICINE | Facility: CLINIC | Age: 60
End: 2025-08-14

## 2025-08-14 VITALS
BODY MASS INDEX: 22.72 KG/M2 | OXYGEN SATURATION: 99 % | SYSTOLIC BLOOD PRESSURE: 126 MMHG | DIASTOLIC BLOOD PRESSURE: 74 MMHG | TEMPERATURE: 98 F | HEART RATE: 74 BPM | RESPIRATION RATE: 18 BRPM | HEIGHT: 74 IN | WEIGHT: 177 LBS

## 2025-08-14 DIAGNOSIS — Z87.891 PERSONAL HISTORY OF TOBACCO USE, PRESENTING HAZARDS TO HEALTH: Primary | ICD-10-CM

## 2025-08-14 DIAGNOSIS — Z87.01 HISTORY OF RECENT PNEUMONIA: ICD-10-CM

## 2025-08-14 DIAGNOSIS — N18.31 STAGE 3A CHRONIC KIDNEY DISEASE: ICD-10-CM

## 2025-08-14 DIAGNOSIS — R06.02 SOB (SHORTNESS OF BREATH) ON EXERTION: ICD-10-CM

## 2025-08-14 DIAGNOSIS — R45.89 DEPRESSED MOOD: ICD-10-CM

## 2025-08-14 DIAGNOSIS — F32.2 CURRENT SEVERE EPISODE OF MAJOR DEPRESSIVE DISORDER WITHOUT PSYCHOTIC FEATURES WITHOUT PRIOR EPISODE: ICD-10-CM

## 2025-08-14 DIAGNOSIS — R05.9 COUGH, UNSPECIFIED TYPE: ICD-10-CM

## 2025-08-14 DIAGNOSIS — F41.8 DEPRESSION WITH ANXIETY: ICD-10-CM

## 2025-08-14 DIAGNOSIS — Z12.11 SCREENING FOR MALIGNANT NEOPLASM OF COLON: ICD-10-CM

## 2025-08-14 DIAGNOSIS — J44.9 COPD SUGGESTED BY INITIAL EVALUATION: ICD-10-CM

## 2025-08-14 LAB
ALBUMIN/CREAT UR: 26.5 MG/GM CR (ref 0–30)
BACTERIA #/AREA URNS AUTO: ABNORMAL /HPF
BILIRUB UR QL STRIP.AUTO: NEGATIVE
CLARITY UR: CLEAR
COLOR UR AUTO: ABNORMAL
CREAT UR-MCNC: 123.3 MG/DL (ref 63–166)
GLUCOSE UR QL STRIP: ABNORMAL
HGB UR QL STRIP: NEGATIVE
HYALINE CASTS #/AREA URNS LPF: ABNORMAL /LPF
KETONES UR QL STRIP: NEGATIVE
LEUKOCYTE ESTERASE UR QL STRIP: NEGATIVE
MICROALBUMIN UR-MCNC: 32.7 UG/ML
MUCOUS THREADS URNS QL MICRO: ABNORMAL /LPF
NITRITE UR QL STRIP: NEGATIVE
PH UR STRIP: 5.5 [PH]
PROT UR QL STRIP: ABNORMAL
RBC #/AREA URNS AUTO: ABNORMAL /HPF
SP GR UR STRIP.AUTO: 1.02 (ref 1–1.03)
SQUAMOUS #/AREA URNS LPF: ABNORMAL /HPF
UROBILINOGEN UR STRIP-ACNC: NORMAL
WBC #/AREA URNS AUTO: ABNORMAL /HPF

## 2025-08-14 PROCEDURE — 81001 URINALYSIS AUTO W/SCOPE: CPT | Performed by: NURSE PRACTITIONER

## 2025-08-14 PROCEDURE — 71046 X-RAY EXAM CHEST 2 VIEWS: CPT | Mod: TC,PN

## 2025-08-14 PROCEDURE — 99215 OFFICE O/P EST HI 40 MIN: CPT | Mod: PBBFAC,25,PN | Performed by: NURSE PRACTITIONER

## 2025-08-14 PROCEDURE — 82570 ASSAY OF URINE CREATININE: CPT | Performed by: NURSE PRACTITIONER

## 2025-08-14 RX ORDER — BUPROPION HYDROCHLORIDE 150 MG/1
150 TABLET ORAL DAILY
Qty: 30 TABLET | Refills: 11 | Status: SHIPPED | OUTPATIENT
Start: 2025-08-14

## 2025-08-14 RX ORDER — ARIPIPRAZOLE 5 MG/1
5 TABLET ORAL DAILY
Qty: 30 TABLET | Refills: 11 | Status: SHIPPED | OUTPATIENT
Start: 2025-08-14 | End: 2026-08-14

## 2025-08-14 RX ORDER — ALBUTEROL SULFATE 90 UG/1
2 INHALANT RESPIRATORY (INHALATION) EVERY 4 HOURS PRN
Qty: 18 G | Refills: 11 | Status: SHIPPED | OUTPATIENT
Start: 2025-08-14

## 2025-08-14 RX ORDER — IPRATROPIUM BROMIDE AND ALBUTEROL SULFATE 2.5; .5 MG/3ML; MG/3ML
3 SOLUTION RESPIRATORY (INHALATION) EVERY 6 HOURS PRN
Qty: 300 ML | Refills: 11 | Status: SHIPPED | OUTPATIENT
Start: 2025-08-14 | End: 2026-08-14

## 2025-08-15 ENCOUNTER — RESULTS FOLLOW-UP (OUTPATIENT)
Dept: FAMILY MEDICINE | Facility: CLINIC | Age: 60
End: 2025-08-15

## 2025-08-15 DIAGNOSIS — E11.9 TYPE 2 DIABETES MELLITUS WITHOUT COMPLICATION, WITHOUT LONG-TERM CURRENT USE OF INSULIN: Primary | ICD-10-CM

## (undated) DEVICE — SUT VICRYL PLUS 2-0 SH 27IN

## (undated) DEVICE — DRSNG POLYSKIN TRNSPAR 4X4.75

## (undated) DEVICE — APPLICATOR CHLORAPREP ORN 26ML

## (undated) DEVICE — GLOVE PROTEXIS LTX MICRO  7.5

## (undated) DEVICE — TRAY CATH FOL SIL URIMTR 16FR

## (undated) DEVICE — BARRIER COLOSTOMY 2 3/4IN

## (undated) DEVICE — GLOVE PROTEXIS PF LATEX 7.0

## (undated) DEVICE — MANIFOLD 4 PORT

## (undated) DEVICE — LEGGING SURG CONV 43X28IN

## (undated) DEVICE — GLOVE PROTEXIS BLUE LATEX 7

## (undated) DEVICE — Device

## (undated) DEVICE — DRAPE UNDERBUTTOCKS PCH STRL

## (undated) DEVICE — GLOVE PROTEXIS BLUE LATEX 6.5

## (undated) DEVICE — GLOVE PROTEXIS PI SYN SURG 7.5

## (undated) DEVICE — GOWN POLY REINF BRTH SLV XL

## (undated) DEVICE — TOWEL OR DISP STRL BLUE 4/PK

## (undated) DEVICE — COVER MAYO STAND REINFRCD 30

## (undated) DEVICE — GLOVE PROTEXIS NEU-THERA SZ6

## (undated) DEVICE — SUT SILK 3-0 SH 18IN BLACK

## (undated) DEVICE — DRESSING TELFA STRL 4X3 LF

## (undated) DEVICE — GLOVE PROTEXIS LTX MICRO 6

## (undated) DEVICE — SUT CHROMIC 3-0 SH 27IN GUT

## (undated) DEVICE — TRAY SKIN SCRUB WET PREMIUM

## (undated) DEVICE — GAUZE SPONGE 4X4 12PLY

## (undated) DEVICE — GLOVE PROTEXIS HYDROGEL SZ7

## (undated) DEVICE — SUT 1 36IN PDS II

## (undated) DEVICE — GOWN SURGICAL XX LARGE X LONG

## (undated) DEVICE — GLOVE PROTEXIS BLUE LATEX 7.5

## (undated) DEVICE — HANDLE DEVON RIGID OR LIGHT

## (undated) DEVICE — SUT 3-0 MONOCRYL PLUS PS-2

## (undated) DEVICE — SPONGE LAP STRL 18X18IN

## (undated) DEVICE — SYR IRRIGATION BULB STER 60ML

## (undated) DEVICE — GLOVE PROTEXIS HYDROGEL SZ6

## (undated) DEVICE — TIP SUCTION YANKAUER

## (undated) DEVICE — SOL 9P NACL IRR PIC IL

## (undated) DEVICE — SEE MEDLINE ITEM 157117

## (undated) DEVICE — STAPLER SKIN ROTATING HEAD

## (undated) DEVICE — RELOAD PROXIMATE CUT BLUE 75MM

## (undated) DEVICE — CLIPPER BLADE MOD 4406 (CAREF)

## (undated) DEVICE — SEE MEDLINE ITEM 157181

## (undated) DEVICE — CUTTER PROXIMATE BLUE 75MM

## (undated) DEVICE — SUT 3/0 27IN PDS II VIO MO

## (undated) DEVICE — SEE MEDLINE ITEM 157125

## (undated) DEVICE — SOLIDIFIER BOTTLE 1500CC

## (undated) DEVICE — CATH UROLOGICAL 18FR RED

## (undated) DEVICE — GLOVE PROTEXIS LTX MICRO 6.5

## (undated) DEVICE — GLOVE PROTEXIS BLUE LATEX 8

## (undated) DEVICE — SOL NACL IRR 1000ML BTL

## (undated) DEVICE — GLOVE PROTEXIS HYDROGEL SZ8